# Patient Record
Sex: FEMALE | Race: WHITE | NOT HISPANIC OR LATINO | Employment: OTHER | URBAN - NONMETROPOLITAN AREA
[De-identification: names, ages, dates, MRNs, and addresses within clinical notes are randomized per-mention and may not be internally consistent; named-entity substitution may affect disease eponyms.]

---

## 2017-02-28 ENCOUNTER — HOSPITAL ENCOUNTER (OUTPATIENT)
Dept: GENERAL RADIOLOGY | Facility: HOSPITAL | Age: 67
Discharge: HOME | End: 2017-02-28
Attending: INTERNAL MEDICINE | Admitting: INTERNAL MEDICINE

## 2017-02-28 ENCOUNTER — TRANSCRIBE ORDERS (OUTPATIENT)
Dept: ULTRASOUND IMAGING | Facility: HOSPITAL | Age: 67
End: 2017-02-28

## 2017-02-28 DIAGNOSIS — R05.9 COUGH: Primary | ICD-10-CM

## 2017-02-28 DIAGNOSIS — R05.9 COUGH: ICD-10-CM

## 2017-02-28 PROCEDURE — 71020 HC CHEST PA AND LATERAL: CPT

## 2017-03-27 ENCOUNTER — TRANSCRIBE ORDERS (OUTPATIENT)
Dept: ADMINISTRATIVE | Facility: HOSPITAL | Age: 67
End: 2017-03-27

## 2017-03-27 DIAGNOSIS — R05.9 COUGH: Primary | ICD-10-CM

## 2017-03-27 DIAGNOSIS — R07.89 OTHER CHEST PAIN: ICD-10-CM

## 2017-03-28 ENCOUNTER — HOSPITAL ENCOUNTER (OUTPATIENT)
Dept: CT IMAGING | Facility: HOSPITAL | Age: 67
Discharge: HOME | End: 2017-03-28
Attending: INTERNAL MEDICINE | Admitting: INTERNAL MEDICINE

## 2017-03-28 DIAGNOSIS — R07.89 OTHER CHEST PAIN: ICD-10-CM

## 2017-03-28 DIAGNOSIS — R05.9 COUGH: ICD-10-CM

## 2017-03-28 LAB — CREAT BLDA-MCNC: 0.8 MG/DL (ref 0.6–1.3)

## 2017-03-28 PROCEDURE — 0 IOPAMIDOL PER 1 ML: Performed by: INTERNAL MEDICINE

## 2017-03-28 PROCEDURE — 71260 CT THORAX DX C+: CPT

## 2017-03-28 PROCEDURE — 82565 ASSAY OF CREATININE: CPT

## 2017-03-28 RX ADMIN — IOPAMIDOL 100 ML: 755 INJECTION, SOLUTION INTRAVENOUS at 10:30

## 2017-05-08 ENCOUNTER — OFFICE VISIT (OUTPATIENT)
Dept: CARDIOLOGY | Facility: CLINIC | Age: 67
End: 2017-05-08

## 2017-05-08 VITALS
DIASTOLIC BLOOD PRESSURE: 83 MMHG | BODY MASS INDEX: 21.76 KG/M2 | SYSTOLIC BLOOD PRESSURE: 162 MMHG | HEIGHT: 65 IN | OXYGEN SATURATION: 95 % | HEART RATE: 61 BPM | WEIGHT: 130.6 LBS

## 2017-05-08 DIAGNOSIS — R00.2 PALPITATIONS: Primary | ICD-10-CM

## 2017-05-08 DIAGNOSIS — I49.3 PVCS (PREMATURE VENTRICULAR CONTRACTIONS): ICD-10-CM

## 2017-05-08 DIAGNOSIS — I10 ESSENTIAL HYPERTENSION: ICD-10-CM

## 2017-05-08 PROCEDURE — 93000 ELECTROCARDIOGRAM COMPLETE: CPT | Performed by: NURSE PRACTITIONER

## 2017-05-08 PROCEDURE — 99213 OFFICE O/P EST LOW 20 MIN: CPT | Performed by: NURSE PRACTITIONER

## 2017-11-15 DIAGNOSIS — K21.9 GASTROESOPHAGEAL REFLUX DISEASE, ESOPHAGITIS PRESENCE NOT SPECIFIED: ICD-10-CM

## 2017-11-16 RX ORDER — OMEPRAZOLE 40 MG/1
CAPSULE, DELAYED RELEASE ORAL
Qty: 60 CAPSULE | Refills: 9 | OUTPATIENT
Start: 2017-11-16

## 2017-11-21 DIAGNOSIS — K21.9 GASTROESOPHAGEAL REFLUX DISEASE, ESOPHAGITIS PRESENCE NOT SPECIFIED: ICD-10-CM

## 2017-11-21 RX ORDER — OMEPRAZOLE 40 MG/1
CAPSULE, DELAYED RELEASE ORAL
Qty: 60 CAPSULE | Refills: 9 | OUTPATIENT
Start: 2017-11-21

## 2017-12-22 ENCOUNTER — HOSPITAL ENCOUNTER (OUTPATIENT)
Dept: CARDIOLOGY | Facility: HOSPITAL | Age: 67
Discharge: HOME | End: 2017-12-22
Attending: INTERNAL MEDICINE | Admitting: INTERNAL MEDICINE

## 2017-12-22 ENCOUNTER — TRANSCRIBE ORDERS (OUTPATIENT)
Dept: ADMINISTRATIVE | Facility: HOSPITAL | Age: 67
End: 2017-12-22

## 2017-12-22 DIAGNOSIS — R07.89 OTHER CHEST PAIN: Primary | ICD-10-CM

## 2017-12-22 PROCEDURE — 93010 ELECTROCARDIOGRAM REPORT: CPT | Performed by: INTERNAL MEDICINE

## 2017-12-22 PROCEDURE — 93005 ELECTROCARDIOGRAM TRACING: CPT

## 2018-01-24 ENCOUNTER — TELEPHONE (OUTPATIENT)
Dept: GASTROENTEROLOGY | Facility: CLINIC | Age: 68
End: 2018-01-24

## 2018-01-24 ENCOUNTER — OFFICE VISIT (OUTPATIENT)
Dept: GASTROENTEROLOGY | Facility: CLINIC | Age: 68
End: 2018-01-24

## 2018-01-24 VITALS
WEIGHT: 130 LBS | TEMPERATURE: 97.3 F | HEIGHT: 65 IN | HEART RATE: 50 BPM | SYSTOLIC BLOOD PRESSURE: 148 MMHG | DIASTOLIC BLOOD PRESSURE: 80 MMHG | OXYGEN SATURATION: 99 % | BODY MASS INDEX: 21.66 KG/M2

## 2018-01-24 DIAGNOSIS — R13.10 ODYNOPHAGIA: Primary | ICD-10-CM

## 2018-01-24 DIAGNOSIS — K21.9 GASTROESOPHAGEAL REFLUX DISEASE, ESOPHAGITIS PRESENCE NOT SPECIFIED: ICD-10-CM

## 2018-01-24 DIAGNOSIS — K59.09 OTHER CONSTIPATION: ICD-10-CM

## 2018-01-24 PROCEDURE — 99213 OFFICE O/P EST LOW 20 MIN: CPT | Performed by: NURSE PRACTITIONER

## 2018-01-24 RX ORDER — ESOMEPRAZOLE MAGNESIUM 40 MG/1
40 CAPSULE, DELAYED RELEASE ORAL 2 TIMES DAILY
Qty: 60 CAPSULE | Refills: 11 | Status: SHIPPED | OUTPATIENT
Start: 2018-01-24

## 2018-01-24 NOTE — TELEPHONE ENCOUNTER
patient called and said she went to  the nexium and it was going to cost her 350.00 so she did not get it.i talked to monserrat and she said for her to take one ppi over the counter in the morning and 2 at night.hopefully that will help.

## 2018-01-24 NOTE — PROGRESS NOTES
Chief Complaint   Patient presents with   • GI Problem     having worse problems burning in throat also constipation       Subjective     HPI     Chronic constipation.  She will go 3-4 days without BM.  She will only take something to relive constipation if she hurts.  No BRBPR or melena.  She c/o burning in esophagus for apx 5 months.  She has occasional heartburn, with occasional KIESHA burning.  Currently taking Nexium in the am and OTC Prilosec at hs.  She will also take Zantac apx 2 times per week.  She has decreased caffeine consumption.    Endoscopy (Dr Kaur) 2016-for eval of dysphagia-procedure normal  CScope (Dr Kaur) 2015-normal      Past Medical History:   Diagnosis Date   • Acid reflux    • Arthritis    • Asthma    • Hypertension    • Palpitations        Past Surgical History:   Procedure Laterality Date   • CHOLECYSTECTOMY     • COLONOSCOPY  10/16/2015    normal   • ENDOSCOPY N/A 11/23/2016    Procedure: ESOPHAGOGASTRODUODENOSCOPY WITH ANESTHESIA;  Surgeon: Grant Kaur DO;  Location: [facility] ENDOSCOPY;  Service:    • HYSTERECTOMY         Outpatient Prescriptions Marked as Taking for the 1/24/18 encounter (Office Visit) with YENNY Pickard   Medication Sig Dispense Refill   • dicyclomine (BENTYL) 10 MG capsule Take 10 mg by mouth 2 (Two) Times a Day.     • esomeprazole (nexIUM) 40 MG capsule Take 1 capsule by mouth 2 (Two) Times a Day. 60 capsule 11   • metoprolol tartrate (LOPRESSOR) 50 MG tablet Take 100 mg by mouth 2 (Two) Times a Day.     • valsartan (DIOVAN) 80 MG tablet Take 80 mg by mouth Daily.     • [DISCONTINUED] esomeprazole (NexIUM) 40 MG capsule Take 40 mg by mouth Every Morning Before Breakfast.     • [DISCONTINUED] omeprazole (PriLOSEC) 40 MG capsule Take 1 capsule by mouth 2 (Two) Times a Day. (Patient taking differently: Take 20 mg by mouth Daily.) 60 capsule 11       Allergies   Allergen Reactions   • Levaquin [Levofloxacin] Parasthesia   • Phenergan [Promethazine Hcl]  "Other (See Comments)     Jerky movements   • Altace [Ramipril] Palpitations       Social History     Social History   • Marital status:      Spouse name: N/A   • Number of children: N/A   • Years of education: N/A     Occupational History   • Not on file.     Social History Main Topics   • Smoking status: Never Smoker   • Smokeless tobacco: Never Used   • Alcohol use No   • Drug use: No   • Sexual activity: Defer     Other Topics Concern   • Not on file     Social History Narrative       Family History   Problem Relation Age of Onset   • Cancer Mother    • Cancer Father      lung   • Cancer Paternal Grandmother      stomach   • Colon cancer Neg Hx    • Esophageal cancer Neg Hx        Review of Systems   Constitutional: Negative for fatigue, fever and unexpected weight change.   HENT: Negative for hearing loss, sore throat and voice change.    Eyes: Negative for visual disturbance.   Respiratory: Negative for cough, shortness of breath and wheezing.    Cardiovascular: Negative for chest pain and palpitations.   Gastrointestinal: Positive for constipation. Negative for abdominal pain, blood in stool and vomiting.   Endocrine: Negative for polydipsia and polyuria.   Genitourinary: Negative for difficulty urinating, dysuria, hematuria and urgency.   Musculoskeletal: Negative for joint swelling and myalgias.   Skin: Negative for color change, rash and wound.   Neurological: Negative for dizziness, tremors, seizures and syncope.   Hematological: Does not bruise/bleed easily.   Psychiatric/Behavioral: Negative for agitation and confusion. The patient is not nervous/anxious.        Objective     Vitals:    01/24/18 0823   BP: 148/80   Pulse: 50   Temp: 97.3 °F (36.3 °C)   SpO2: 99%   Weight: 59 kg (130 lb)   Height: 165.1 cm (65\")     Body mass index is 21.63 kg/(m^2).    Physical Exam   Constitutional: She is oriented to person, place, and time. She appears well-developed and well-nourished.   HENT:   Head: " Normocephalic and atraumatic.   Eyes: Conjunctivae are normal. Pupils are equal, round, and reactive to light. No scleral icterus.   Neck: No JVD present. No thyroid mass and no thyromegaly present.   Cardiovascular: Normal rate, regular rhythm and normal heart sounds.  Exam reveals no gallop and no friction rub.    No murmur heard.  Pulmonary/Chest: Effort normal and breath sounds normal. No accessory muscle usage. No respiratory distress. She has no wheezes. She has no rales.   Abdominal: Soft. Bowel sounds are normal. She exhibits no distension, no ascites and no mass. There is no splenomegaly or hepatomegaly. There is no tenderness. There is no rebound and no guarding.   Genitourinary:   Genitourinary Comments: Rectal-Did not examine   Musculoskeletal: Normal range of motion. She exhibits no edema.   Neurological: She is alert and oriented to person, place, and time.   Deemed a reliable historian, able to converse without difficulty and able to move all extremities without difficulty   Skin: Skin is warm and dry.   Psychiatric: She has a normal mood and affect. Her behavior is normal.       Imaging Results (most recent)     None          Assessment/Plan     Liset was seen today for gi problem.    Diagnoses and all orders for this visit:    Odynophagia  -     esomeprazole (nexIUM) 40 MG capsule; Take 1 capsule by mouth 2 (Two) Times a Day.    Gastroesophageal reflux disease, esophagitis presence not specified      * Surgery not found *    Miralax 1-2 times per day, adjust as needed  take Nexium 30 min prior to breakfast and supper  Call in 3 weeks with update, if no improvement consider EGD  offerred EGD, pt wished to try bid Nexium   There are no Patient Instructions on file for this visit.

## 2018-05-14 ENCOUNTER — OFFICE VISIT (OUTPATIENT)
Dept: CARDIOLOGY | Facility: CLINIC | Age: 68
End: 2018-05-14

## 2018-05-14 VITALS
OXYGEN SATURATION: 98 % | SYSTOLIC BLOOD PRESSURE: 140 MMHG | BODY MASS INDEX: 22.2 KG/M2 | WEIGHT: 130 LBS | HEART RATE: 62 BPM | HEIGHT: 64 IN | DIASTOLIC BLOOD PRESSURE: 80 MMHG

## 2018-05-14 DIAGNOSIS — I10 ESSENTIAL HYPERTENSION: ICD-10-CM

## 2018-05-14 DIAGNOSIS — R00.2 PALPITATIONS: ICD-10-CM

## 2018-05-14 DIAGNOSIS — R06.09 EXERTIONAL DYSPNEA: ICD-10-CM

## 2018-05-14 DIAGNOSIS — E78.2 MIXED HYPERLIPIDEMIA: Primary | ICD-10-CM

## 2018-05-14 PROCEDURE — 93000 ELECTROCARDIOGRAM COMPLETE: CPT | Performed by: INTERNAL MEDICINE

## 2018-05-14 PROCEDURE — 99214 OFFICE O/P EST MOD 30 MIN: CPT | Performed by: INTERNAL MEDICINE

## 2018-05-14 NOTE — PROGRESS NOTES
Referring Provider: Harry Hastings MD    Reason for Follow-up Visit: HTN    Subjective .   Chief Complaint:   Chief Complaint   Patient presents with   • Follow-up     yearly   • Palpitations     has had some beating hard and fast at times.   • Hypertension     follwed by PCP.  which she is having her watch it because its been elevated at times.       History of present illness:  Liset Wright is a 67 y.o. yo female with history of HTN and palpitations. Hasn't felt well lately but BP has been running high. Says her palpitations have not been bad. Admits to chest pressure or burning.        History  Past Medical History:   Diagnosis Date   • Acid reflux    • Arrhythmia    • Arthritis    • Asthma    • Hypertension    • Palpitations    ,   Past Surgical History:   Procedure Laterality Date   • CHOLECYSTECTOMY     • COLONOSCOPY  10/16/2015    normal   • ENDOSCOPY N/A 11/23/2016    Procedure: ESOPHAGOGASTRODUODENOSCOPY WITH ANESTHESIA;  Surgeon: Grant Kaur DO;  Location: [facility] ENDOSCOPY;  Service:    • HYSTERECTOMY     ,   Family History   Problem Relation Age of Onset   • Cancer Mother    • Cancer Father      lung   • Cancer Paternal Grandmother      stomach   • No Known Problems Brother    • Colon cancer Neg Hx    • Esophageal cancer Neg Hx    ,   Social History   Substance Use Topics   • Smoking status: Never Smoker   • Smokeless tobacco: Never Used   • Alcohol use No   ,     Medications  Current Outpatient Prescriptions   Medication Sig Dispense Refill   • dicyclomine (BENTYL) 10 MG capsule Take 10 mg by mouth 2 (Two) Times a Day.     • esomeprazole (nexIUM) 40 MG capsule Take 1 capsule by mouth 2 (Two) Times a Day. 60 capsule 11   • metoprolol tartrate (LOPRESSOR) 50 MG tablet Take 100 mg by mouth 2 (Two) Times a Day.     • valsartan (DIOVAN) 80 MG tablet Take 160 mg by mouth Daily.       No current facility-administered medications for this visit.        Allergies:  Levaquin [levofloxacin]; Phenergan  "[promethazine hcl]; and Altace [ramipril]    Review of Systems  Review of Systems   HENT: Negative for nosebleeds.    Cardiovascular: Positive for chest pain, dyspnea on exertion and palpitations. Negative for claudication, irregular heartbeat, leg swelling, near-syncope, orthopnea, paroxysmal nocturnal dyspnea and syncope.   Respiratory: Positive for shortness of breath. Negative for cough and hemoptysis.    Gastrointestinal: Negative for dysphagia, hematemesis and melena.   Genitourinary: Negative for hematuria.   All other systems reviewed and are negative.      Objective     Physical Exam:  /80 (BP Location: Left arm, Patient Position: Sitting, Cuff Size: Adult)   Pulse 62   Ht 162.6 cm (64\")   Wt 59 kg (130 lb)   SpO2 98%   BMI 22.31 kg/m²   Physical Exam   Constitutional: She is oriented to person, place, and time. She appears well-developed and well-nourished. No distress.   HENT:   Head: Normocephalic and atraumatic.   Eyes: No scleral icterus.   Neck: Normal range of motion.   Cardiovascular: Normal rate, regular rhythm and normal heart sounds.  Exam reveals no gallop and no friction rub.    No murmur heard.  Pulmonary/Chest: Effort normal and breath sounds normal. No respiratory distress. She has no wheezes. She has no rales.   Abdominal: Soft. Bowel sounds are normal. She exhibits no distension. There is no tenderness.   Musculoskeletal: She exhibits no edema.   Neurological: She is alert and oriented to person, place, and time. No cranial nerve deficit.   Skin: Skin is warm and dry. She is not diaphoretic. No erythema.   Psychiatric: She has a normal mood and affect. Her behavior is normal.       Results Review:    ECG 12 Lead  Date/Time: 5/14/2018 9:33 AM  Performed by: ERROL JARRELL  Authorized by: ERROL JARRELL   Comparison: compared with previous ECG   Similar to previous ECG  Rhythm: sinus rhythm  Rate: normal  Conduction: conduction normal  ST Segments: ST segments normal  T Waves: T " waves normal  QRS axis: normal  Clinical impression: normal ECG            Hospital Outpatient Visit on 03/28/2017   Component Date Value Ref Range Status   • Creatinine 03/28/2017 0.80  0.60 - 1.30 mg/dL Final       Assessment/Plan   Liset was seen today for follow-up, palpitations and hypertension.    Diagnoses and all orders for this visit:    Exertional dyspnea, may be an anginal equivalent but could also be do to HTN. Will schedule stress and check her lipid profile    Essential hypertension, better control on increased dose of diovan    Palpitations, stable    Other orders  -     ECG 12 Lead

## 2018-05-25 ENCOUNTER — HOSPITAL ENCOUNTER (OUTPATIENT)
Dept: CARDIOLOGY | Facility: HOSPITAL | Age: 68
Discharge: HOME | End: 2018-05-25
Attending: INTERNAL MEDICINE | Admitting: INTERNAL MEDICINE

## 2018-05-25 VITALS — SYSTOLIC BLOOD PRESSURE: 192 MMHG | HEART RATE: 56 BPM | DIASTOLIC BLOOD PRESSURE: 95 MMHG

## 2018-05-25 DIAGNOSIS — R06.09 EXERTIONAL DYSPNEA: ICD-10-CM

## 2018-05-25 PROCEDURE — 93350 STRESS TTE ONLY: CPT

## 2018-05-25 PROCEDURE — 25010000002 PERFLUTREN 6.52 MG/ML SUSPENSION: Performed by: INTERNAL MEDICINE

## 2018-05-25 PROCEDURE — 93018 CV STRESS TEST I&R ONLY: CPT | Performed by: INTERNAL MEDICINE

## 2018-05-25 PROCEDURE — 93352 ADMIN ECG CONTRAST AGENT: CPT | Performed by: INTERNAL MEDICINE

## 2018-05-25 PROCEDURE — 25010000003 DOBUTAMINE PER 250 MG: Performed by: INTERNAL MEDICINE

## 2018-05-25 PROCEDURE — 93350 STRESS TTE ONLY: CPT | Performed by: INTERNAL MEDICINE

## 2018-05-25 PROCEDURE — 93017 CV STRESS TEST TRACING ONLY: CPT

## 2018-05-25 RX ORDER — DOBUTAMINE HYDROCHLORIDE 100 MG/100ML
10 INJECTION INTRAVENOUS
Status: DISCONTINUED | OUTPATIENT
Start: 2018-05-25 | End: 2018-05-26 | Stop reason: HOSPADM

## 2018-05-25 RX ADMIN — ATROPINE SULFATE 2 MG: 0.1 INJECTION, SOLUTION INTRAVENOUS at 09:32

## 2018-05-25 RX ADMIN — PERFLUTREN 8.48 MG: 6.52 INJECTION, SUSPENSION INTRAVENOUS at 09:25

## 2018-05-25 RX ADMIN — Medication 10 MCG/KG/MIN: at 09:27

## 2018-05-29 LAB
BH CV STRESS BP STAGE 1: NORMAL
BH CV STRESS BP STAGE 2: NORMAL
BH CV STRESS DURATION MIN STAGE 1: 3
BH CV STRESS DURATION MIN STAGE 2: 3
BH CV STRESS DURATION SEC STAGE 1: 0
BH CV STRESS DURATION SEC STAGE 2: 0
BH CV STRESS GRADE STAGE 1: 10
BH CV STRESS GRADE STAGE 2: 12
BH CV STRESS HR STAGE 1: 78
BH CV STRESS HR STAGE 2: 92
BH CV STRESS METS STAGE 1: 5
BH CV STRESS METS STAGE 2: 7.5
BH CV STRESS PROTOCOL 1: NORMAL
BH CV STRESS RECOVERY BP: NORMAL MMHG
BH CV STRESS RECOVERY HR: 102 BPM
BH CV STRESS SPEED STAGE 1: 1.7
BH CV STRESS SPEED STAGE 2: 2.5
BH CV STRESS STAGE 1: 1
BH CV STRESS STAGE 2: 2
MAXIMAL PREDICTED HEART RATE: 155 BPM
PERCENT MAX PREDICTED HR: 81.94 %
STRESS BASELINE BP: NORMAL MMHG
STRESS BASELINE HR: 60 BPM
STRESS PERCENT HR: 96 %
STRESS POST EXERCISE DUR MIN: 9 MIN
STRESS POST EXERCISE DUR SEC: 53 SEC
STRESS POST PEAK BP: NORMAL MMHG
STRESS POST PEAK HR: 127 BPM
STRESS TARGET HR: 132 BPM

## 2018-05-30 ENCOUNTER — TELEPHONE (OUTPATIENT)
Dept: CARDIOLOGY | Facility: CLINIC | Age: 68
End: 2018-05-30

## 2018-05-30 NOTE — TELEPHONE ENCOUNTER
----- Message from Blayne Hawk MD sent at 5/29/2018 10:23 AM CDT -----  Negative for ischemia    5/30/18   Pt informed.  Left a  msg.  Elias Schmitt, CMA

## 2018-08-27 LAB
CHOLEST SERPL-MCNC: 183 MG/DL (ref 100–199)
HDLC SERPL-MCNC: 83 MG/DL
LDLC SERPL CALC-MCNC: 89 MG/DL (ref 0–99)
TRIGL SERPL-MCNC: 55 MG/DL (ref 0–149)
VLDLC SERPL CALC-MCNC: 11 MG/DL (ref 5–40)

## 2018-11-28 ENCOUNTER — OFFICE VISIT (OUTPATIENT)
Dept: GASTROENTEROLOGY | Facility: CLINIC | Age: 68
End: 2018-11-28

## 2018-11-28 VITALS
SYSTOLIC BLOOD PRESSURE: 150 MMHG | DIASTOLIC BLOOD PRESSURE: 70 MMHG | WEIGHT: 132 LBS | BODY MASS INDEX: 22.53 KG/M2 | HEART RATE: 59 BPM | HEIGHT: 64 IN | OXYGEN SATURATION: 97 % | TEMPERATURE: 96.4 F

## 2018-11-28 DIAGNOSIS — K21.9 GASTROESOPHAGEAL REFLUX DISEASE, ESOPHAGITIS PRESENCE NOT SPECIFIED: ICD-10-CM

## 2018-11-28 DIAGNOSIS — R10.13 EPIGASTRIC PAIN: Primary | ICD-10-CM

## 2018-11-28 PROCEDURE — 99214 OFFICE O/P EST MOD 30 MIN: CPT | Performed by: NURSE PRACTITIONER

## 2018-11-28 NOTE — PROGRESS NOTES
"Chief Complaint   Patient presents with   • GI Problem     pt states her esophagus hurts/burns when she eats, x 3-4 months       Subjective     HPI    Dull KIESHA pain that has been constant on daily basis x 3 months.  No aggravating or alleviating factors to pain.   She has burning in esophagus after eating.  She has had associated nausea, no emesis.  Nausea has improved at this time. Occasional indigestion depending on what she eats.  Bowels have not changed.  She has hx of constipation, not worse than \"her normal.\"  No rectal bleeding or melena.  Sometimes has difficulty swallowing breads and meat.  Drinking something will relieve sx of dysphagia. Sx felt in mid esophagus.  Currently taking OTC nexium, she takes 4 pills per day, insurance will not cover prescription nexium.  Utilization of prilosec in the past did not provide relief.  She has utilized Zantac unsuccessfully  Utiizes Excedrin Migraine avg 4 times per month    Endoscopy (Dr Kaur) 11/2016-normal    CScope (Dr Kaur) 2015-unremarkable (5-10)  CScope (Dr Kaur) 2011-marginal prep colon      Past Medical History:   Diagnosis Date   • Acid reflux    • Arrhythmia    • Arthritis    • Asthma    • Hypertension    • Palpitations        Past Surgical History:   Procedure Laterality Date   • CARDIAC CATHETERIZATION     • CHOLECYSTECTOMY     • COLONOSCOPY  10/16/2015    normal   • HYSTERECTOMY         Outpatient Medications Marked as Taking for the 11/28/18 encounter (Office Visit) with Kalyan Lazcano APRN   Medication Sig Dispense Refill   • dicyclomine (BENTYL) 10 MG capsule Take 10 mg by mouth 2 (Two) Times a Day.     • esomeprazole (nexIUM) 40 MG capsule Take 1 capsule by mouth 2 (Two) Times a Day. 60 capsule 11   • metoprolol tartrate (LOPRESSOR) 50 MG tablet Take 100 mg by mouth 2 (Two) Times a Day.     • valsartan (DIOVAN) 320 MG tablet Take 320 mg by mouth Daily.         Allergies   Allergen Reactions   • Levaquin [Levofloxacin] Paresthesia   • " Mometasone Furo-Formoterol Fum Unknown (See Comments)   • Phenergan [Promethazine Hcl] Other (See Comments)     Jerky movements   • Altace [Ramipril] Palpitations       Social History     Socioeconomic History   • Marital status:      Spouse name: Not on file   • Number of children: Not on file   • Years of education: Not on file   • Highest education level: Not on file   Social Needs   • Financial resource strain: Not on file   • Food insecurity - worry: Not on file   • Food insecurity - inability: Not on file   • Transportation needs - medical: Not on file   • Transportation needs - non-medical: Not on file   Occupational History   • Not on file   Tobacco Use   • Smoking status: Never Smoker   • Smokeless tobacco: Never Used   Substance and Sexual Activity   • Alcohol use: No   • Drug use: No   • Sexual activity: Defer   Other Topics Concern   • Not on file   Social History Narrative   • Not on file       Family History   Problem Relation Age of Onset   • Cancer Mother    • Cancer Father         lung   • Cancer Paternal Grandmother         stomach   • No Known Problems Brother    • Colon cancer Neg Hx    • Esophageal cancer Neg Hx        Review of Systems   Constitutional: Negative for fatigue, fever and unexpected weight change.   HENT: Negative for hearing loss, sore throat and voice change.    Eyes: Negative for visual disturbance.   Respiratory: Negative for cough, shortness of breath and wheezing.    Cardiovascular: Negative for chest pain and palpitations.   Gastrointestinal: Positive for constipation. Negative for abdominal pain, blood in stool and vomiting.   Endocrine: Negative for polydipsia and polyuria.   Genitourinary: Negative for difficulty urinating, dysuria, hematuria and urgency.   Musculoskeletal: Negative for joint swelling and myalgias.   Skin: Negative for color change, rash and wound.   Neurological: Negative for dizziness, tremors, seizures and syncope.   Hematological: Does not  "bruise/bleed easily.   Psychiatric/Behavioral: Negative for agitation and confusion. The patient is not nervous/anxious.        Objective     Vitals:    11/28/18 1359   BP: 150/70   Pulse: 59   Temp: 96.4 °F (35.8 °C)   SpO2: 97%   Weight: 59.9 kg (132 lb)   Height: 162.6 cm (64\")     Body mass index is 22.66 kg/m².    Physical Exam   Constitutional: She is oriented to person, place, and time. She appears well-developed and well-nourished. She is cooperative.   HENT:   Head: Normocephalic and atraumatic.   Eyes: Conjunctivae are normal. Pupils are equal, round, and reactive to light. No scleral icterus.   Neck: Normal range of motion. Neck supple. No JVD present. No thyroid mass and no thyromegaly present.   Cardiovascular: Normal rate, regular rhythm and normal heart sounds. Exam reveals no gallop and no friction rub.   No murmur heard.  Pulmonary/Chest: Effort normal and breath sounds normal. No accessory muscle usage. No respiratory distress. She has no wheezes. She has no rales.   Abdominal: Soft. Normal appearance and bowel sounds are normal. She exhibits no distension, no ascites and no mass. There is no hepatosplenomegaly. There is no tenderness. There is no rebound and no guarding.   Musculoskeletal: Normal range of motion. She exhibits no edema or tenderness.     Vascular Status -  Her right foot exhibits normal foot vasculature  and no edema. Her left foot exhibits normal foot vasculature  and no edema.  Lymphadenopathy:     She has no cervical adenopathy.   Neurological: She is alert and oriented to person, place, and time. She has normal strength. Gait normal.   Skin: Skin is warm, dry and intact. No rash noted.       Imaging Results (most recent)     None          Body mass index is 22.66 kg/m².    Assessment/Plan     iLset was seen today for gi problem.    Diagnoses and all orders for this visit:    Epigastric pain  -     Case Request; Standing  -     Implement Anesthesia Orders Day of Procedure; " Standing  -     Obtain Informed Consent; Standing    Gastroesophageal reflux disease, esophagitis presence not specified        ESOPHAGOGASTRODUODENOSCOPY WITH ANESTHESIA (N/A)      Suggest to stop Bentyl, Miralax did not help in past, will trial Linzess  Cont Nexium 2 po bid, take 30 min prior to breakfast/supper    The risk of the endoscopy were discussed in detail.  We discussed the risk of perforation (one out of 0845-3923, riskier with dilation), bleeding (one out of 500), and the rare risks of infection, adverse reaction to anesthesia, respiratory failure, cardiac failure including MI and adverse reaction to medications, etc.  We discussed consequences that could occur if a risk were to develop such as the need for hospitalization, blood transfusion, surgical intervention, medications, pain and disability and death.  Alternatives include not doing anything, or pursuing an UGI series which only offers a diagnosis with potential less accuracy compared to egd.  The patient verbalizes understanding and agrees to proceed.      There are no Patient Instructions on file for this visit.

## 2018-12-11 ENCOUNTER — ANESTHESIA (OUTPATIENT)
Dept: GASTROENTEROLOGY | Facility: HOSPITAL | Age: 68
End: 2018-12-11

## 2018-12-11 ENCOUNTER — HOSPITAL ENCOUNTER (OUTPATIENT)
Facility: HOSPITAL | Age: 68
Setting detail: HOSPITAL OUTPATIENT SURGERY
Discharge: HOME | End: 2018-12-11
Attending: INTERNAL MEDICINE | Admitting: INTERNAL MEDICINE

## 2018-12-11 ENCOUNTER — ANESTHESIA EVENT (OUTPATIENT)
Dept: GASTROENTEROLOGY | Facility: HOSPITAL | Age: 68
End: 2018-12-11

## 2018-12-11 VITALS
RESPIRATION RATE: 19 BRPM | DIASTOLIC BLOOD PRESSURE: 77 MMHG | HEART RATE: 58 BPM | TEMPERATURE: 97.5 F | WEIGHT: 131 LBS | SYSTOLIC BLOOD PRESSURE: 186 MMHG | OXYGEN SATURATION: 99 % | BODY MASS INDEX: 22.36 KG/M2 | HEIGHT: 64 IN

## 2018-12-11 DIAGNOSIS — R10.13 EPIGASTRIC PAIN: ICD-10-CM

## 2018-12-11 PROCEDURE — 43239 EGD BIOPSY SINGLE/MULTIPLE: CPT | Performed by: INTERNAL MEDICINE

## 2018-12-11 PROCEDURE — 25010000002 PROPOFOL 10 MG/ML EMULSION: Performed by: NURSE ANESTHETIST, CERTIFIED REGISTERED

## 2018-12-11 PROCEDURE — 87081 CULTURE SCREEN ONLY: CPT | Performed by: INTERNAL MEDICINE

## 2018-12-11 RX ORDER — SODIUM CHLORIDE 9 MG/ML
500 INJECTION, SOLUTION INTRAVENOUS CONTINUOUS PRN
Status: DISCONTINUED | OUTPATIENT
Start: 2018-12-11 | End: 2018-12-11 | Stop reason: HOSPADM

## 2018-12-11 RX ORDER — SODIUM CHLORIDE 0.9 % (FLUSH) 0.9 %
3 SYRINGE (ML) INJECTION AS NEEDED
Status: DISCONTINUED | OUTPATIENT
Start: 2018-12-11 | End: 2018-12-11 | Stop reason: HOSPADM

## 2018-12-11 RX ORDER — PROPOFOL 10 MG/ML
VIAL (ML) INTRAVENOUS AS NEEDED
Status: DISCONTINUED | OUTPATIENT
Start: 2018-12-11 | End: 2018-12-11 | Stop reason: SURG

## 2018-12-11 RX ORDER — LIDOCAINE HYDROCHLORIDE 20 MG/ML
INJECTION, SOLUTION INFILTRATION; PERINEURAL AS NEEDED
Status: DISCONTINUED | OUTPATIENT
Start: 2018-12-11 | End: 2018-12-11 | Stop reason: SURG

## 2018-12-11 RX ADMIN — PROPOFOL 100 MG: 10 INJECTION, EMULSION INTRAVENOUS at 08:31

## 2018-12-11 RX ADMIN — SODIUM CHLORIDE 500 ML: 9 INJECTION, SOLUTION INTRAVENOUS at 07:25

## 2018-12-11 RX ADMIN — LIDOCAINE HYDROCHLORIDE 140 MG: 20 INJECTION, SOLUTION INFILTRATION; PERINEURAL at 08:31

## 2018-12-11 NOTE — ANESTHESIA POSTPROCEDURE EVALUATION
"Patient: Liset Wright    Procedure Summary     Date:  12/11/18 Room / Location:  [facility] ENDOSCOPY 4 / [facility]    Anesthesia Start:  0827 Anesthesia Stop:  0837    Procedure:  ESOPHAGOGASTRODUODENOSCOPY WITH ANESTHESIA (N/A ) Diagnosis:       Epigastric pain      (Epigastric pain [R10.13])    Surgeon:  Grant Kaur DO Provider:  Hugo Wong CRNA    Anesthesia Type:  general ASA Status:  2          Anesthesia Type: general  Last vitals  BP   142/52 (12/11/18 0835)   Temp   97.5 °F (36.4 °C) (12/11/18 0713)   Pulse   60 (12/11/18 0835)   Resp   15 (12/11/18 0835)     SpO2   97 % (12/11/18 0835)     Post Anesthesia Care and Evaluation    Patient location during evaluation: PACU  Patient participation: complete - patient participated  Level of consciousness: awake and alert  Pain management: adequate  Airway patency: patent  Anesthetic complications: No anesthetic complications    Cardiovascular status: acceptable  Respiratory status: acceptable  Hydration status: acceptable    Comments: Blood pressure 142/52, pulse 60, temperature 97.5 °F (36.4 °C), temperature source Temporal, resp. rate 15, height 162.6 cm (64\"), weight 59.4 kg (131 lb), SpO2 97 %, not currently breastfeeding.    Pt discharged from PACU based on telma score >8      "

## 2018-12-11 NOTE — ANESTHESIA PREPROCEDURE EVALUATION
Anesthesia Evaluation     Patient summary reviewed   NPO Solid Status: > 8 hours  NPO Liquid Status: > 2 hours           Airway   Mallampati: I  TM distance: >3 FB  Neck ROM: full  No difficulty expected  Dental    (+) partials    Pulmonary    (+) asthma,   (-) sleep apnea, not a smoker  Cardiovascular   Exercise tolerance: good (4-7 METS)    Patient on routine beta blocker and Beta blocker given within 24 hours of surgery    (+) hypertension, valvular problems/murmurs murmur,       Neuro/Psych  (-) seizures, CVA  GI/Hepatic/Renal/Endo    (+)  GERD,    (-) liver disease, no renal disease, diabetes    Musculoskeletal     Abdominal    Substance History      OB/GYN          Other                        Anesthesia Plan    ASA 2     general   total IV anesthesia  intravenous induction   Anesthetic plan, all risks, benefits, and alternatives have been provided, discussed and informed consent has been obtained with: patient.

## 2018-12-12 LAB — UREASE TISS QL: NEGATIVE

## 2019-01-16 ENCOUNTER — APPOINTMENT (OUTPATIENT)
Dept: LAB | Facility: HOSPITAL | Age: 69
End: 2019-01-16
Attending: INTERNAL MEDICINE

## 2019-01-16 ENCOUNTER — TRANSCRIBE ORDERS (OUTPATIENT)
Dept: ADMINISTRATIVE | Facility: HOSPITAL | Age: 69
End: 2019-01-16

## 2019-01-16 DIAGNOSIS — D72.829 LEUKOCYTOSIS, UNSPECIFIED TYPE: ICD-10-CM

## 2019-01-16 DIAGNOSIS — R10.12 LEFT UPPER QUADRANT PAIN: Primary | ICD-10-CM

## 2019-01-16 PROCEDURE — 88185 FLOWCYTOMETRY/TC ADD-ON: CPT | Performed by: INTERNAL MEDICINE

## 2019-01-16 PROCEDURE — 86665 EPSTEIN-BARR CAPSID VCA: CPT | Performed by: INTERNAL MEDICINE

## 2019-01-16 PROCEDURE — 86663 EPSTEIN-BARR ANTIBODY: CPT | Performed by: INTERNAL MEDICINE

## 2019-01-16 PROCEDURE — 88184 FLOWCYTOMETRY/ TC 1 MARKER: CPT | Performed by: INTERNAL MEDICINE

## 2019-01-16 PROCEDURE — 88189 FLOWCYTOMETRY/READ 16 & >: CPT | Performed by: INTERNAL MEDICINE

## 2019-01-16 PROCEDURE — 36415 COLL VENOUS BLD VENIPUNCTURE: CPT

## 2019-01-16 PROCEDURE — 86664 EPSTEIN-BARR NUCLEAR ANTIGEN: CPT | Performed by: INTERNAL MEDICINE

## 2019-01-17 LAB
EBV EA IGG SER-ACNC: <9 U/ML (ref 0–8.9)
EBV NA IGG SER IA-ACNC: 118 U/ML (ref 0–17.9)
EBV VCA IGG SER-ACNC: 239 U/ML (ref 0–17.9)
EBV VCA IGM SER-ACNC: <36 U/ML (ref 0–35.9)
INTERPRETATION: ABNORMAL
REF LAB TEST METHOD: NORMAL

## 2019-01-28 ENCOUNTER — LAB REQUISITION (OUTPATIENT)
Dept: LAB | Facility: HOSPITAL | Age: 69
End: 2019-01-28

## 2019-01-28 ENCOUNTER — TRANSCRIBE ORDERS (OUTPATIENT)
Dept: ADMINISTRATIVE | Facility: HOSPITAL | Age: 69
End: 2019-01-28

## 2019-01-28 DIAGNOSIS — C91.10 ANEMIA ASSOCIATED WITH CHRONIC LYMPHOCYTIC LEUKEMIA TREATED WITH ERYTHROPOIETIN (HCC): Primary | ICD-10-CM

## 2019-01-28 DIAGNOSIS — Z00.00 ENCOUNTER FOR GENERAL ADULT MEDICAL EXAMINATION WITHOUT ABNORMAL FINDINGS: ICD-10-CM

## 2019-01-28 DIAGNOSIS — D63.0 ANEMIA ASSOCIATED WITH CHRONIC LYMPHOCYTIC LEUKEMIA TREATED WITH ERYTHROPOIETIN (HCC): Primary | ICD-10-CM

## 2019-01-28 LAB
ALBUMIN SERPL-MCNC: 4.4 G/DL (ref 3.5–5)
ALBUMIN/GLOB SERPL: 1.6 G/DL (ref 1.1–2.5)
ALP SERPL-CCNC: 78 U/L (ref 24–120)
ALT SERPL W P-5'-P-CCNC: 27 U/L (ref 0–54)
ANION GAP SERPL CALCULATED.3IONS-SCNC: 9 MMOL/L (ref 4–13)
AST SERPL-CCNC: 30 U/L (ref 7–45)
BILIRUB SERPL-MCNC: 0.5 MG/DL (ref 0.1–1)
BUN BLD-MCNC: 13 MG/DL (ref 5–21)
BUN/CREAT SERPL: 18.8 (ref 7–25)
CALCIUM SPEC-SCNC: 9.9 MG/DL (ref 8.4–10.4)
CHLORIDE SERPL-SCNC: 101 MMOL/L (ref 98–110)
CO2 SERPL-SCNC: 30 MMOL/L (ref 24–31)
CREAT BLD-MCNC: 0.69 MG/DL (ref 0.5–1.4)
FERRITIN SERPL-MCNC: 23.7 NG/ML (ref 11.1–264)
FOLATE SERPL-MCNC: 15 NG/ML
GFR SERPL CREATININE-BSD FRML MDRD: 85 ML/MIN/1.73
GLOBULIN UR ELPH-MCNC: 2.7 GM/DL
GLUCOSE BLD-MCNC: 95 MG/DL (ref 70–100)
HIV1+2 AB SER QL: NEGATIVE
IRON 24H UR-MRATE: 101 MCG/DL (ref 42–180)
IRON SATN MFR SERPL: 30 % (ref 20–45)
LDH SERPL-CCNC: 539 U/L (ref 265–665)
POTASSIUM BLD-SCNC: 5 MMOL/L (ref 3.5–5.3)
PROT SERPL-MCNC: 7.1 G/DL (ref 6.3–8.7)
SODIUM BLD-SCNC: 140 MMOL/L (ref 135–145)
TIBC SERPL-MCNC: 333 MCG/DL (ref 225–420)
VIT B12 BLD-MCNC: 431 PG/ML (ref 239–931)

## 2019-01-28 PROCEDURE — 83615 LACTATE (LD) (LDH) ENZYME: CPT | Performed by: INTERNAL MEDICINE

## 2019-01-28 PROCEDURE — 83550 IRON BINDING TEST: CPT | Performed by: INTERNAL MEDICINE

## 2019-01-28 PROCEDURE — 82746 ASSAY OF FOLIC ACID SERUM: CPT | Performed by: INTERNAL MEDICINE

## 2019-01-28 PROCEDURE — 80053 COMPREHEN METABOLIC PANEL: CPT | Performed by: INTERNAL MEDICINE

## 2019-01-28 PROCEDURE — 82784 ASSAY IGA/IGD/IGG/IGM EACH: CPT | Performed by: INTERNAL MEDICINE

## 2019-01-28 PROCEDURE — 83540 ASSAY OF IRON: CPT | Performed by: INTERNAL MEDICINE

## 2019-01-28 PROCEDURE — 82232 ASSAY OF BETA-2 PROTEIN: CPT | Performed by: INTERNAL MEDICINE

## 2019-01-28 PROCEDURE — 82607 VITAMIN B-12: CPT | Performed by: INTERNAL MEDICINE

## 2019-01-28 PROCEDURE — 82728 ASSAY OF FERRITIN: CPT | Performed by: INTERNAL MEDICINE

## 2019-01-28 PROCEDURE — G0432 EIA HIV-1/HIV-2 SCREEN: HCPCS | Performed by: INTERNAL MEDICINE

## 2019-01-29 LAB — IGG SERPL-MCNC: 742 MG/DL (ref 700–1600)

## 2019-01-30 LAB — B2 MICROGLOB SERPL-MCNC: 1.3 MG/L (ref 0.6–2.4)

## 2019-02-01 ENCOUNTER — HOSPITAL ENCOUNTER (OUTPATIENT)
Dept: CT IMAGING | Facility: HOSPITAL | Age: 69
Discharge: HOME | End: 2019-02-01
Attending: INTERNAL MEDICINE | Admitting: RADIOLOGY

## 2019-02-01 VITALS
RESPIRATION RATE: 16 BRPM | WEIGHT: 127.5 LBS | DIASTOLIC BLOOD PRESSURE: 56 MMHG | HEART RATE: 64 BPM | BODY MASS INDEX: 21.24 KG/M2 | OXYGEN SATURATION: 100 % | SYSTOLIC BLOOD PRESSURE: 121 MMHG | TEMPERATURE: 98 F | HEIGHT: 65 IN

## 2019-02-01 LAB
APTT PPP: 29.6 SECONDS (ref 24.1–34.8)
BASOPHILS # BLD MANUAL: 0.16 10*3/MM3 (ref 0–0.2)
BASOPHILS NFR BLD AUTO: 1.1 % (ref 0–2)
DEPRECATED RDW RBC AUTO: 42.8 FL (ref 40–54)
EOSINOPHIL # BLD MANUAL: 0.3 10*3/MM3 (ref 0–0.7)
EOSINOPHIL NFR BLD MANUAL: 2.1 % (ref 0–4)
ERYTHROCYTE [DISTWIDTH] IN BLOOD BY AUTOMATED COUNT: 13.1 % (ref 12–15)
HCT VFR BLD AUTO: 36 % (ref 37–47)
HGB BLD-MCNC: 12 G/DL (ref 12–16)
INR PPP: 0.94 (ref 0.91–1.09)
LYMPHOCYTES # BLD MANUAL: 8.64 10*3/MM3 (ref 0.72–4.86)
LYMPHOCYTES NFR BLD MANUAL: 3.2 % (ref 4–12)
LYMPHOCYTES NFR BLD MANUAL: 59.6 % (ref 15–45)
MCH RBC QN AUTO: 29.9 PG (ref 28–32)
MCHC RBC AUTO-ENTMCNC: 33.3 G/DL (ref 33–36)
MCV RBC AUTO: 89.6 FL (ref 82–98)
MONOCYTES # BLD AUTO: 0.46 10*3/MM3 (ref 0.19–1.3)
NEUTROPHILS # BLD AUTO: 4.93 10*3/MM3 (ref 1.87–8.4)
NEUTROPHILS NFR BLD MANUAL: 34 % (ref 39–78)
PLAT MORPH BLD: NORMAL
PLATELET # BLD AUTO: 266 10*3/MM3 (ref 130–400)
PLATELET # BLD AUTO: 266 10*3/MM3 (ref 130–400)
PMV BLD AUTO: 10.5 FL (ref 6–12)
POIKILOCYTOSIS BLD QL SMEAR: ABNORMAL
PROTHROMBIN TIME: 12.8 SECONDS (ref 11.9–14.6)
RBC # BLD AUTO: 4.02 10*6/MM3 (ref 4.2–5.4)
SMUDGE CELLS BLD QL SMEAR: ABNORMAL
WBC NRBC COR # BLD: 14.5 10*3/MM3 (ref 4.8–10.8)

## 2019-02-01 PROCEDURE — 88237 TISSUE CULTURE BONE MARROW: CPT | Performed by: INTERNAL MEDICINE

## 2019-02-01 PROCEDURE — 77012 CT SCAN FOR NEEDLE BIOPSY: CPT

## 2019-02-01 PROCEDURE — 88264 CHROMOSOME ANALYSIS 20-25: CPT | Performed by: INTERNAL MEDICINE

## 2019-02-01 PROCEDURE — 25010000002 MIDAZOLAM PER 1 MG: Performed by: RADIOLOGY

## 2019-02-01 PROCEDURE — 88185 FLOWCYTOMETRY/TC ADD-ON: CPT | Performed by: INTERNAL MEDICINE

## 2019-02-01 PROCEDURE — 88311 DECALCIFY TISSUE: CPT | Performed by: INTERNAL MEDICINE

## 2019-02-01 PROCEDURE — 85730 THROMBOPLASTIN TIME PARTIAL: CPT | Performed by: INTERNAL MEDICINE

## 2019-02-01 PROCEDURE — 88280 CHROMOSOME KARYOTYPE STUDY: CPT | Performed by: INTERNAL MEDICINE

## 2019-02-01 PROCEDURE — 88305 TISSUE EXAM BY PATHOLOGIST: CPT

## 2019-02-01 PROCEDURE — 85610 PROTHROMBIN TIME: CPT | Performed by: INTERNAL MEDICINE

## 2019-02-01 PROCEDURE — 88342 IMHCHEM/IMCYTCHM 1ST ANTB: CPT

## 2019-02-01 PROCEDURE — 85025 COMPLETE CBC W/AUTO DIFF WBC: CPT | Performed by: INTERNAL MEDICINE

## 2019-02-01 PROCEDURE — 25010000002 FENTANYL CITRATE (PF) 100 MCG/2ML SOLUTION: Performed by: RADIOLOGY

## 2019-02-01 PROCEDURE — 88313 SPECIAL STAINS GROUP 2: CPT | Performed by: INTERNAL MEDICINE

## 2019-02-01 PROCEDURE — 88184 FLOWCYTOMETRY/ TC 1 MARKER: CPT | Performed by: INTERNAL MEDICINE

## 2019-02-01 PROCEDURE — 85049 AUTOMATED PLATELET COUNT: CPT | Performed by: INTERNAL MEDICINE

## 2019-02-01 PROCEDURE — 88323 CONSLTJ&REPRT MATRL PREP SLD: CPT

## 2019-02-01 PROCEDURE — 88305 TISSUE EXAM BY PATHOLOGIST: CPT | Performed by: INTERNAL MEDICINE

## 2019-02-01 PROCEDURE — 88313 SPECIAL STAINS GROUP 2: CPT

## 2019-02-01 PROCEDURE — 88341 IMHCHEM/IMCYTCHM EA ADD ANTB: CPT

## 2019-02-01 RX ORDER — MIDAZOLAM HYDROCHLORIDE 1 MG/ML
INJECTION INTRAMUSCULAR; INTRAVENOUS
Status: COMPLETED | OUTPATIENT
Start: 2019-02-01 | End: 2019-02-01

## 2019-02-01 RX ORDER — SODIUM CHLORIDE 0.9 % (FLUSH) 0.9 %
3-10 SYRINGE (ML) INJECTION AS NEEDED
Status: DISCONTINUED | OUTPATIENT
Start: 2019-02-01 | End: 2019-02-02 | Stop reason: HOSPADM

## 2019-02-01 RX ORDER — FENTANYL CITRATE 50 UG/ML
INJECTION, SOLUTION INTRAMUSCULAR; INTRAVENOUS
Status: COMPLETED | OUTPATIENT
Start: 2019-02-01 | End: 2019-02-01

## 2019-02-01 RX ORDER — LIDOCAINE HYDROCHLORIDE 10 MG/ML
INJECTION, SOLUTION INFILTRATION; PERINEURAL
Status: COMPLETED | OUTPATIENT
Start: 2019-02-01 | End: 2019-02-01

## 2019-02-01 RX ORDER — SODIUM CHLORIDE 0.9 % (FLUSH) 0.9 %
3 SYRINGE (ML) INJECTION EVERY 12 HOURS SCHEDULED
Status: DISCONTINUED | OUTPATIENT
Start: 2019-02-01 | End: 2019-02-02 | Stop reason: HOSPADM

## 2019-02-01 RX ADMIN — MIDAZOLAM 2 MG: 1 INJECTION INTRAMUSCULAR; INTRAVENOUS at 11:05

## 2019-02-01 RX ADMIN — FENTANYL CITRATE 25 MCG: 50 INJECTION, SOLUTION INTRAMUSCULAR; INTRAVENOUS at 11:05

## 2019-02-01 RX ADMIN — LIDOCAINE HYDROCHLORIDE 10 ML: 10 INJECTION, SOLUTION INFILTRATION; PERINEURAL at 11:05

## 2019-02-14 LAB
CYTO UR: NORMAL
LAB AP CASE REPORT: NORMAL
PATH REPORT.FINAL DX SPEC: NORMAL
PATH REPORT.GROSS SPEC: NORMAL

## 2019-02-21 ENCOUNTER — APPOINTMENT (OUTPATIENT)
Dept: LAB | Facility: HOSPITAL | Age: 69
End: 2019-02-21

## 2019-02-21 ENCOUNTER — TRANSCRIBE ORDERS (OUTPATIENT)
Dept: ADMINISTRATIVE | Facility: HOSPITAL | Age: 69
End: 2019-02-21

## 2019-02-21 DIAGNOSIS — C95.90 LEUKEMIA NOT HAVING ACHIEVED REMISSION, UNSPECIFIED LEUKEMIA TYPE (HCC): Primary | ICD-10-CM

## 2019-02-21 LAB
DEPRECATED RDW RBC AUTO: 42.5 FL (ref 40–54)
ERYTHROCYTE [DISTWIDTH] IN BLOOD BY AUTOMATED COUNT: 13 % (ref 12–15)
HCT VFR BLD AUTO: 38.3 % (ref 37–47)
HGB BLD-MCNC: 12.7 G/DL (ref 12–16)
HYPOCHROMIA BLD QL: ABNORMAL
LDH SERPL-CCNC: 518 U/L (ref 265–665)
LYMPHOCYTES # BLD MANUAL: 11.61 10*3/MM3 (ref 0.72–4.86)
LYMPHOCYTES NFR BLD MANUAL: 1 % (ref 4–12)
LYMPHOCYTES NFR BLD MANUAL: 88 % (ref 15–45)
MCH RBC QN AUTO: 30.2 PG (ref 28–32)
MCHC RBC AUTO-ENTMCNC: 33.2 G/DL (ref 33–36)
MCV RBC AUTO: 91 FL (ref 82–98)
MONOCYTES # BLD AUTO: 0.13 10*3/MM3 (ref 0.19–1.3)
NEUTROPHILS # BLD AUTO: 1.45 10*3/MM3 (ref 1.87–8.4)
NEUTROPHILS NFR BLD MANUAL: 11 % (ref 39–78)
NRBC SPEC MANUAL: 1 /100 WBC (ref 0–0)
PLAT MORPH BLD: NORMAL
PLATELET # BLD AUTO: 257 10*3/MM3 (ref 130–400)
PMV BLD AUTO: 10 FL (ref 6–12)
POIKILOCYTOSIS BLD QL SMEAR: ABNORMAL
RBC # BLD AUTO: 4.21 10*6/MM3 (ref 4.2–5.4)
SMUDGE CELLS BLD QL SMEAR: ABNORMAL
WBC NRBC COR # BLD: 13.19 10*3/MM3 (ref 4.8–10.8)

## 2019-02-21 PROCEDURE — 83615 LACTATE (LD) (LDH) ENZYME: CPT | Performed by: INTERNAL MEDICINE

## 2019-02-21 PROCEDURE — 82784 ASSAY IGA/IGD/IGG/IGM EACH: CPT | Performed by: INTERNAL MEDICINE

## 2019-02-21 PROCEDURE — 85025 COMPLETE CBC W/AUTO DIFF WBC: CPT | Performed by: INTERNAL MEDICINE

## 2019-02-21 PROCEDURE — 85007 BL SMEAR W/DIFF WBC COUNT: CPT | Performed by: INTERNAL MEDICINE

## 2019-02-21 PROCEDURE — 36415 COLL VENOUS BLD VENIPUNCTURE: CPT

## 2019-02-21 PROCEDURE — 82232 ASSAY OF BETA-2 PROTEIN: CPT | Performed by: INTERNAL MEDICINE

## 2019-02-22 LAB — IGG SERPL-MCNC: 675 MG/DL (ref 700–1600)

## 2019-02-23 LAB — B2 MICROGLOB SERPL-MCNC: 1.5 MG/L (ref 0.6–2.4)

## 2019-02-25 ENCOUNTER — APPOINTMENT (OUTPATIENT)
Dept: LAB | Facility: HOSPITAL | Age: 69
End: 2019-02-25

## 2019-02-25 LAB
COLLECT DATE SP2 STL: NORMAL
COLLECT DATE SP3 STL: NORMAL
COLLECT DATE STL: NORMAL
HEMOCCULT STL QL: NEGATIVE
Lab: 900

## 2019-02-25 PROCEDURE — 82270 OCCULT BLOOD FECES: CPT | Performed by: INTERNAL MEDICINE

## 2019-04-24 ENCOUNTER — TRANSCRIBE ORDERS (OUTPATIENT)
Dept: ADMINISTRATIVE | Facility: HOSPITAL | Age: 69
End: 2019-04-24

## 2019-04-24 ENCOUNTER — APPOINTMENT (OUTPATIENT)
Dept: LAB | Facility: HOSPITAL | Age: 69
End: 2019-04-24

## 2019-04-24 DIAGNOSIS — C91.10 ANEMIA ASSOCIATED WITH CHRONIC LYMPHOCYTIC LEUKEMIA TREATED WITH ERYTHROPOIETIN (HCC): Primary | ICD-10-CM

## 2019-04-24 DIAGNOSIS — D63.0 ANEMIA ASSOCIATED WITH CHRONIC LYMPHOCYTIC LEUKEMIA TREATED WITH ERYTHROPOIETIN (HCC): Primary | ICD-10-CM

## 2019-04-24 LAB
BASOPHILS # BLD MANUAL: 0.16 10*3/MM3 (ref 0–0.2)
BASOPHILS NFR BLD AUTO: 1 % (ref 0–2)
DEPRECATED RDW RBC AUTO: 44.1 FL (ref 40–54)
ERYTHROCYTE [DISTWIDTH] IN BLOOD BY AUTOMATED COUNT: 13.2 % (ref 12–15)
HCT VFR BLD AUTO: 36.1 % (ref 37–47)
HGB BLD-MCNC: 11.9 G/DL (ref 12–16)
LYMPHOCYTES # BLD MANUAL: 8.76 10*3/MM3 (ref 0.72–4.86)
LYMPHOCYTES NFR BLD MANUAL: 54 % (ref 15–45)
LYMPHOCYTES NFR BLD MANUAL: 7 % (ref 4–12)
MCH RBC QN AUTO: 29.8 PG (ref 28–32)
MCHC RBC AUTO-ENTMCNC: 33 G/DL (ref 33–36)
MCV RBC AUTO: 90.5 FL (ref 82–98)
MONOCYTES # BLD AUTO: 1.14 10*3/MM3 (ref 0.19–1.3)
NEUTROPHILS # BLD AUTO: 4.38 10*3/MM3 (ref 1.87–8.4)
NEUTROPHILS NFR BLD MANUAL: 27 % (ref 39–78)
PLATELET # BLD AUTO: 228 10*3/MM3 (ref 130–400)
PMV BLD AUTO: 9.4 FL (ref 6–12)
PROLYMPHOCYTES NFR BLD MANUAL: 2 % (ref 0–0)
RBC # BLD AUTO: 3.99 10*6/MM3 (ref 4.2–5.4)
SMALL PLATELETS BLD QL SMEAR: ADEQUATE
STOMATOCYTES BLD QL SMEAR: ABNORMAL
VARIANT LYMPHS NFR BLD MANUAL: 9 % (ref 0–5)
WBC MORPH BLD: NORMAL
WBC NRBC COR # BLD: 16.22 10*3/MM3 (ref 4.8–10.8)

## 2019-04-24 PROCEDURE — 85007 BL SMEAR W/DIFF WBC COUNT: CPT | Performed by: INTERNAL MEDICINE

## 2019-04-24 PROCEDURE — 85025 COMPLETE CBC W/AUTO DIFF WBC: CPT | Performed by: INTERNAL MEDICINE

## 2019-04-24 PROCEDURE — 36415 COLL VENOUS BLD VENIPUNCTURE: CPT | Performed by: INTERNAL MEDICINE

## 2019-05-31 ENCOUNTER — TRANSCRIBE ORDERS (OUTPATIENT)
Dept: ADMINISTRATIVE | Facility: HOSPITAL | Age: 69
End: 2019-05-31

## 2019-05-31 ENCOUNTER — APPOINTMENT (OUTPATIENT)
Dept: LAB | Facility: HOSPITAL | Age: 69
End: 2019-05-31

## 2019-05-31 DIAGNOSIS — C91.10 CHRONIC LYMPHOCYTIC LEUKEMIA (HCC): Primary | ICD-10-CM

## 2019-05-31 LAB
DEPRECATED RDW RBC AUTO: 44.1 FL (ref 40–54)
EOSINOPHIL # BLD MANUAL: 0.16 10*3/MM3 (ref 0–0.7)
EOSINOPHIL NFR BLD MANUAL: 1 % (ref 0–4)
ERYTHROCYTE [DISTWIDTH] IN BLOOD BY AUTOMATED COUNT: 13.1 % (ref 12–15)
FERRITIN SERPL-MCNC: 35.6 NG/ML (ref 11.1–264)
HCT VFR BLD AUTO: 37.6 % (ref 37–47)
HGB BLD-MCNC: 12.5 G/DL (ref 12–16)
IRON 24H UR-MRATE: 99 MCG/DL (ref 42–180)
IRON SATN MFR SERPL: 34 % (ref 20–45)
LDH SERPL-CCNC: 549 U/L (ref 265–665)
LYMPHOCYTES # BLD MANUAL: 13.83 10*3/MM3 (ref 0.72–4.86)
LYMPHOCYTES NFR BLD MANUAL: 1 % (ref 4–12)
LYMPHOCYTES NFR BLD MANUAL: 84 % (ref 15–45)
MCH RBC QN AUTO: 30.3 PG (ref 28–32)
MCHC RBC AUTO-ENTMCNC: 33.2 G/DL (ref 33–36)
MCV RBC AUTO: 91.3 FL (ref 82–98)
MONOCYTES # BLD AUTO: 0.16 10*3/MM3 (ref 0.19–1.3)
NEUTROPHILS # BLD AUTO: 2.14 10*3/MM3 (ref 1.87–8.4)
NEUTROPHILS NFR BLD MANUAL: 13 % (ref 39–78)
NRBC BLD AUTO-RTO: 0 /100 WBC (ref 0–0.2)
PLAT MORPH BLD: NORMAL
PLATELET # BLD AUTO: 250 10*3/MM3 (ref 130–400)
PMV BLD AUTO: 9.4 FL (ref 6–12)
RBC # BLD AUTO: 4.12 10*6/MM3 (ref 4.2–5.4)
STOMATOCYTES BLD QL SMEAR: ABNORMAL
TIBC SERPL-MCNC: 289 MCG/DL (ref 225–420)
VARIANT LYMPHS NFR BLD MANUAL: 1 % (ref 0–5)
WBC MORPH BLD: NORMAL
WBC NRBC COR # BLD: 16.47 10*3/MM3 (ref 4.8–10.8)

## 2019-05-31 PROCEDURE — 83540 ASSAY OF IRON: CPT | Performed by: INTERNAL MEDICINE

## 2019-05-31 PROCEDURE — 36415 COLL VENOUS BLD VENIPUNCTURE: CPT | Performed by: INTERNAL MEDICINE

## 2019-05-31 PROCEDURE — 82232 ASSAY OF BETA-2 PROTEIN: CPT | Performed by: INTERNAL MEDICINE

## 2019-05-31 PROCEDURE — 82728 ASSAY OF FERRITIN: CPT | Performed by: INTERNAL MEDICINE

## 2019-05-31 PROCEDURE — 83550 IRON BINDING TEST: CPT | Performed by: INTERNAL MEDICINE

## 2019-05-31 PROCEDURE — 82784 ASSAY IGA/IGD/IGG/IGM EACH: CPT | Performed by: INTERNAL MEDICINE

## 2019-05-31 PROCEDURE — 85007 BL SMEAR W/DIFF WBC COUNT: CPT | Performed by: INTERNAL MEDICINE

## 2019-05-31 PROCEDURE — 85025 COMPLETE CBC W/AUTO DIFF WBC: CPT | Performed by: INTERNAL MEDICINE

## 2019-05-31 PROCEDURE — 83615 LACTATE (LD) (LDH) ENZYME: CPT | Performed by: INTERNAL MEDICINE

## 2019-06-01 LAB — IGG SERPL-MCNC: 682 MG/DL (ref 700–1600)

## 2019-06-03 LAB — B2 MICROGLOB SERPL-MCNC: 1.7 MG/L (ref 0.6–2.4)

## 2019-07-05 ENCOUNTER — OFFICE VISIT (OUTPATIENT)
Dept: CARDIOLOGY | Facility: CLINIC | Age: 69
End: 2019-07-05

## 2019-07-05 VITALS
HEIGHT: 65 IN | WEIGHT: 132 LBS | OXYGEN SATURATION: 96 % | BODY MASS INDEX: 21.99 KG/M2 | HEART RATE: 55 BPM | DIASTOLIC BLOOD PRESSURE: 78 MMHG | SYSTOLIC BLOOD PRESSURE: 140 MMHG

## 2019-07-05 DIAGNOSIS — I49.3 PVCS (PREMATURE VENTRICULAR CONTRACTIONS): ICD-10-CM

## 2019-07-05 DIAGNOSIS — R00.2 PALPITATIONS: Primary | ICD-10-CM

## 2019-07-05 DIAGNOSIS — I10 ESSENTIAL HYPERTENSION: ICD-10-CM

## 2019-07-05 PROCEDURE — 99213 OFFICE O/P EST LOW 20 MIN: CPT | Performed by: INTERNAL MEDICINE

## 2019-07-05 PROCEDURE — 93000 ELECTROCARDIOGRAM COMPLETE: CPT | Performed by: INTERNAL MEDICINE

## 2019-07-05 NOTE — PROGRESS NOTES
"  Referring Provider: Harry Hastings MD    Reason for Follow-up Visit: palpitations    Subjective .   Chief Complaint:   Chief Complaint   Patient presents with   • Follow-up     yearly  pt states she is being treated for luekemia by dr. Nelson   • Heart Problem     PVC's with Palpitations.  pt states that she has not felt any palpitations much since on the lopressor.  it keeps the HR down.  she states that she sometimes will feel a \"thump\" but its not bothersome.   • Hypertension     pt states most of the time its ok at home.   • Hyperlipidemia     last labs done 8/2018 LDL 89 takes no cholesterol medication.       History of present illness:  iLset Wright is a 68 y.o. yo female with history of symptomatic PVC's. Much improved on a beta blocker. Denies any CP. Has chronic OHARA        History  Past Medical History:   Diagnosis Date   • Acid reflux    • Arrhythmia    • Arthritis    • Asthma    • Cancer (CMS/HCC)     leukemia   • Hypertension    • Palpitations    ,   Past Surgical History:   Procedure Laterality Date   • CARDIAC CATHETERIZATION     • CHOLECYSTECTOMY     • COLONOSCOPY  10/16/2015    normal   • ENDOSCOPY N/A 11/23/2016    normal   • ENDOSCOPY N/A 12/11/2018    Procedure: ESOPHAGOGASTRODUODENOSCOPY WITH ANESTHESIA;  Surgeon: Grant Kaur DO;  Location: [facility] ENDOSCOPY;  Service: Gastroenterology   • HYSTERECTOMY     ,   Family History   Problem Relation Age of Onset   • Cancer Mother    • Cancer Father         lung   • Cancer Paternal Grandmother         stomach   • No Known Problems Brother    • Colon cancer Neg Hx    • Esophageal cancer Neg Hx    ,   Social History     Tobacco Use   • Smoking status: Never Smoker   • Smokeless tobacco: Never Used   Substance Use Topics   • Alcohol use: No   • Drug use: No   ,     Medications  Current Outpatient Medications   Medication Sig Dispense Refill   • dicyclomine (BENTYL) 10 MG capsule Take 10 mg by mouth 2 (Two) Times a Day.     • esomeprazole " "(nexIUM) 40 MG capsule Take 1 capsule by mouth 2 (Two) Times a Day. 60 capsule 11   • metoprolol tartrate (LOPRESSOR) 100 MG tablet Take 100 mg by mouth 2 (Two) Times a Day.     • valsartan (DIOVAN) 320 MG tablet Take 320 mg by mouth Daily.       No current facility-administered medications for this visit.        Allergies:  Levaquin [levofloxacin]; Phenergan [promethazine hcl]; and Altace [ramipril]    Review of Systems  Review of Systems   Constitution: Positive for malaise/fatigue.   HENT: Negative for nosebleeds.    Cardiovascular: Negative for chest pain, claudication, dyspnea on exertion, irregular heartbeat, leg swelling, near-syncope, orthopnea, palpitations, paroxysmal nocturnal dyspnea and syncope.   Respiratory: Negative for cough, hemoptysis and shortness of breath.    Gastrointestinal: Negative for dysphagia, hematemesis and melena.   Genitourinary: Negative for hematuria.   All other systems reviewed and are negative.      Objective     Physical Exam:  /78 (BP Location: Left arm, Patient Position: Sitting, Cuff Size: Adult)   Pulse 55   Ht 163.8 cm (64.5\")   Wt 59.9 kg (132 lb)   SpO2 96%   BMI 22.31 kg/m²   Physical Exam   Constitutional: She is oriented to person, place, and time. She appears well-developed and well-nourished. No distress.   HENT:   Head: Normocephalic and atraumatic.   Eyes: No scleral icterus.   Neck: Normal range of motion.   Cardiovascular: Normal rate, regular rhythm and normal heart sounds. Exam reveals no gallop and no friction rub.   No murmur heard.  Pulmonary/Chest: Effort normal and breath sounds normal. No respiratory distress. She has no wheezes. She has no rales.   Abdominal: Soft. Bowel sounds are normal. She exhibits no distension. There is no tenderness.   Musculoskeletal: She exhibits no edema.   Neurological: She is alert and oriented to person, place, and time. No cranial nerve deficit.   Skin: Skin is warm and dry. She is not diaphoretic. No erythema. "   Psychiatric: She has a normal mood and affect. Her behavior is normal.       Results Review:    ECG 12 Lead  Date/Time: 7/5/2019 10:14 AM  Performed by: Blayne Hawk MD  Authorized by: Blayne Hawk MD   Comparison: compared with previous ECG   Similar to previous ECG  Rhythm: sinus rhythm  Rate: normal  Conduction: conduction normal  ST Segments: ST segments normal  T Waves: T waves normal  QRS axis: normal  Other findings: left ventricular hypertrophy    Clinical impression: abnormal EKG            Transcribe Orders on 05/31/2019   Component Date Value Ref Range Status   • Iron 05/31/2019 99  42 - 180 mcg/dL Final   • TIBC 05/31/2019 289  225 - 420 mcg/dL Final   • Iron Saturation 05/31/2019 34  20 - 45 % Final   • Ferritin 05/31/2019 35.60  11.10 - 264.00 ng/mL Final   • IgG 05/31/2019 682* 700 - 1600 mg/dL Final   • LDH 05/31/2019 549  265 - 665 U/L Final   • Beta-2 05/31/2019 1.7  0.6 - 2.4 mg/L Final    [employer] Immulite 2000 Immunochemiluminometric assay (ICMA)  Values obtained with different assay methods or kits cannot be used  interchangeably. Results cannot be interpreted as absolute evidence  of the presence or absence of malignant disease.   • WBC 05/31/2019 16.47* 4.80 - 10.80 10*3/mm3 Final   • RBC 05/31/2019 4.12* 4.20 - 5.40 10*6/mm3 Final   • Hemoglobin 05/31/2019 12.5  12.0 - 16.0 g/dL Final   • Hematocrit 05/31/2019 37.6  37.0 - 47.0 % Final   • MCV 05/31/2019 91.3  82.0 - 98.0 fL Final   • MCH 05/31/2019 30.3  28.0 - 32.0 pg Final   • MCHC 05/31/2019 33.2  33.0 - 36.0 g/dL Final   • RDW 05/31/2019 13.1  12.0 - 15.0 % Final   • RDW-SD 05/31/2019 44.1  40.0 - 54.0 fl Final   • MPV 05/31/2019 9.4  6.0 - 12.0 fL Final   • Platelets 05/31/2019 250  130 - 400 10*3/mm3 Final   • nRBC 05/31/2019 0.0  0.0 - 0.2 /100 WBC Final   • Neutrophil % 05/31/2019 13.0* 39.0 - 78.0 % Final   • Lymphocyte % 05/31/2019 84.0* 15.0 - 45.0 % Final   • Monocyte % 05/31/2019 1.0* 4.0 - 12.0 % Final   • Eosinophil %  05/31/2019 1.0  0.0 - 4.0 % Final   • Atypical Lymphocyte % 05/31/2019 1.0  0.0 - 5.0 % Final   • Neutrophils Absolute 05/31/2019 2.14  1.87 - 8.40 10*3/mm3 Final   • Lymphocytes Absolute 05/31/2019 13.83* 0.72 - 4.86 10*3/mm3 Final   • Monocytes Absolute 05/31/2019 0.16* 0.19 - 1.30 10*3/mm3 Final   • Eosinophils Absolute 05/31/2019 0.16  0.00 - 0.70 10*3/mm3 Final   • Stomatocytes 05/31/2019 Mod/2+  None Seen Final   • WBC Morphology 05/31/2019 Normal  Normal Final   • Platelet Morphology 05/31/2019 Normal  Normal Final       Assessment/Plan   Liset was seen today for follow-up, heart problem, hypertension and hyperlipidemia.    Diagnoses and all orders for this visit:    Palpitations due to PVCs (premature ventricular contractions), better on lopressor    Essential hypertension, good control    Other orders  -     ECG 12 Lead        Patient's Body mass index is 22.31 kg/m². BMI is within normal parameters. No follow-up required..

## 2019-07-17 ENCOUNTER — OFFICE VISIT (OUTPATIENT)
Dept: PULMONOLOGY | Facility: CLINIC | Age: 69
End: 2019-07-17

## 2019-07-17 VITALS
SYSTOLIC BLOOD PRESSURE: 120 MMHG | DIASTOLIC BLOOD PRESSURE: 78 MMHG | OXYGEN SATURATION: 96 % | HEART RATE: 54 BPM | BODY MASS INDEX: 22.16 KG/M2 | HEIGHT: 65 IN | WEIGHT: 133 LBS

## 2019-07-17 DIAGNOSIS — J40 BRONCHITIS, NOT SPECIFIED AS ACUTE OR CHRONIC: Primary | ICD-10-CM

## 2019-07-17 DIAGNOSIS — J98.4 SCARRING OF LUNG: ICD-10-CM

## 2019-07-17 LAB
FEV1/FVC: NORMAL %
FEV1: NORMAL LITERS
FVC VOL RESPIRATORY: NORMAL LITERS

## 2019-07-17 PROCEDURE — 94010 BREATHING CAPACITY TEST: CPT | Performed by: INTERNAL MEDICINE

## 2019-07-17 PROCEDURE — 99213 OFFICE O/P EST LOW 20 MIN: CPT | Performed by: INTERNAL MEDICINE

## 2019-07-17 NOTE — PATIENT INSTRUCTIONS
I didadvise the patient that her pulmonary functions are reasonably stable.  She is doing well clinically from a pulmonary standpoint.  She has been recently diagnosed with CLL and is being followed by Dr. Dumont for this.  I will see her back in 1 year with a flow volume loop on return.

## 2019-07-18 NOTE — PROGRESS NOTES
Subjective   Liset Wright is a 68 y.o. female.     Chief Complaint   Patient presents with   • Shortness of Breath      No My Sticky Note on file.    History of Present Illness   The patient was recently diagnosed with CLL.  She is being followed by Dr. Julia Steward for this.  She had problems with just being fatigued and laboratory studies revealed elevated white count and subsequent evaluation confirmed CLL.  She complains of mild dyspnea at present but if there is been no change in her pattern of dyspnea since her last visit.  She presently does not have any wheezing.    Medical/Family/Social History   has a past medical history of Acid reflux, Arrhythmia, Arthritis, Asthma, Cancer (CMS/HCC), Hypertension, and Palpitations.   has a past surgical history that includes Hysterectomy; Cholecystectomy; Esophagogastroduodenoscopy (N/A, 11/23/2016); Cardiac catheterization; Colonoscopy (10/16/2015); and Esophagogastroduodenoscopy (N/A, 12/11/2018).  family history includes Cancer in her father, mother, and paternal grandmother; No Known Problems in her brother.   reports that she has never smoked. She has never used smokeless tobacco. She reports that she does not drink alcohol or use drugs.  Allergies   Allergen Reactions   • Levaquin [Levofloxacin] Paresthesia   • Phenergan [Promethazine Hcl] Other (See Comments)     Jerky movements   • Altace [Ramipril] Palpitations     Medications    Current Outpatient Medications:   •  dicyclomine (BENTYL) 10 MG capsule, Take 10 mg by mouth 2 (Two) Times a Day., Disp: , Rfl:   •  esomeprazole (nexIUM) 40 MG capsule, Take 1 capsule by mouth 2 (Two) Times a Day., Disp: 60 capsule, Rfl: 11  •  metoprolol tartrate (LOPRESSOR) 100 MG tablet, Take 100 mg by mouth 2 (Two) Times a Day., Disp: , Rfl:   •  valsartan (DIOVAN) 320 MG tablet, Take 320 mg by mouth Daily., Disp: , Rfl:     Review of Systems   Constitutional: Positive for fatigue. Negative for chills and fever.   HENT:  "Negative for congestion.    Eyes: Negative for visual disturbance.   Respiratory: Positive for shortness of breath. Negative for cough.         Her shortness of breath is quite mild.   Cardiovascular: Negative for chest pain.   Gastrointestinal: Negative for diarrhea, nausea and vomiting.   Genitourinary: Negative for difficulty urinating.   Musculoskeletal: Negative for arthralgias.   Skin: Negative for rash.   Neurological: Negative for dizziness and speech difficulty.   Hematological: Negative for adenopathy.   Psychiatric/Behavioral: The patient is not nervous/anxious.      ------------------------------------  Objective   /78   Pulse 54   Ht 163.8 cm (64.5\")   Wt 60.3 kg (133 lb)   SpO2 96% Comment: Ra  Breastfeeding? No   BMI 22.48 kg/m²   Physical Exam   Constitutional: She is oriented to person, place, and time. She appears well-developed and well-nourished.   HENT:   Head: Normocephalic and atraumatic.   Eyes: EOM are normal. Pupils are equal, round, and reactive to light.   Neck: Normal range of motion. Neck supple.   Cardiovascular: Normal rate, regular rhythm and normal heart sounds.   Pulmonary/Chest: Effort normal.   She has reasonable air movement bilaterally and no adventitious sounds are heard.   Abdominal: Soft.   Musculoskeletal: Normal range of motion.   Neurological: She is alert and oriented to person, place, and time.   Skin: Skin is warm and dry.   Psychiatric: She has a normal mood and affect.   Nursing note and vitals reviewed.          Pulmonary Functions Testing Results:  FEV1   Date Value Ref Range Status   07/17/2019 60% liters Final     FVC   Date Value Ref Range Status   07/17/2019 75% liters Final     FEV1/FVC   Date Value Ref Range Status   07/17/2019 63.56% % Final      My interpretation of PFT:  1.  Spirometry is consistent with a moderate obstructive ventilatory defect.  2.  Current studies show a slight drop in her FVC and also a drop in her FEV1 compared to previous " studies from 2018 although she remains in the moderate category regarding her airflow obstruction.  Assessment/Plan   Liset was seen today for shortness of breath.    Diagnoses and all orders for this visit:    Bronchitis, not specified as acute or chronic  -     Pulmonary Function Test    Scarring of lung      Patient's Body mass index is 22.48 kg/m². BMI is within normal parameters. No follow-up required..      I do think that her airflow obstruction relates predominately to her history of asthma.  She is a non-smoker and I think it is unlikely has COPD.  If she does have problems with wheezing or increasing dyspnea future we can certainly add inhaled medications.  Otherwise I will see her back in 1 year with a flow volume loop on return.

## 2019-08-21 ENCOUNTER — APPOINTMENT (OUTPATIENT)
Dept: LAB | Facility: HOSPITAL | Age: 69
End: 2019-08-21

## 2019-08-21 ENCOUNTER — TRANSCRIBE ORDERS (OUTPATIENT)
Dept: ADMINISTRATIVE | Facility: HOSPITAL | Age: 69
End: 2019-08-21

## 2019-08-21 DIAGNOSIS — C91.10 CHRONIC LYMPHOCYTIC LEUKEMIA, RAI STAGE 0 (HCC): Primary | ICD-10-CM

## 2019-08-21 LAB
DEPRECATED RDW RBC AUTO: 42 FL (ref 37–54)
ERYTHROCYTE [DISTWIDTH] IN BLOOD BY AUTOMATED COUNT: 12.7 % (ref 12.3–15.4)
FERRITIN SERPL-MCNC: 53.2 NG/ML (ref 11.1–264)
HCT VFR BLD AUTO: 37.2 % (ref 34–46.6)
HGB BLD-MCNC: 12.5 G/DL (ref 12–15.9)
IRON 24H UR-MRATE: 101 MCG/DL (ref 42–180)
IRON SATN MFR SERPL: 34 % (ref 20–45)
LDH SERPL-CCNC: 599 U/L (ref 265–665)
LYMPHOCYTES # BLD MANUAL: 9.34 10*3/MM3 (ref 0.7–3.1)
LYMPHOCYTES NFR BLD MANUAL: 6 % (ref 5–12)
LYMPHOCYTES NFR BLD MANUAL: 67 % (ref 19.6–45.3)
MCH RBC QN AUTO: 30.5 PG (ref 26.6–33)
MCHC RBC AUTO-ENTMCNC: 33.6 G/DL (ref 31.5–35.7)
MCV RBC AUTO: 90.7 FL (ref 79–97)
MONOCYTES # BLD AUTO: 0.84 10*3/MM3 (ref 0.1–0.9)
NEUTROPHILS # BLD AUTO: 3.62 10*3/MM3 (ref 1.7–7)
NEUTROPHILS NFR BLD MANUAL: 26 % (ref 42.7–76)
PLAT MORPH BLD: NORMAL
PLATELET # BLD AUTO: 233 10*3/MM3 (ref 140–450)
PMV BLD AUTO: 9.7 FL (ref 6–12)
RBC # BLD AUTO: 4.1 10*6/MM3 (ref 3.77–5.28)
RBC MORPH BLD: NORMAL
TIBC SERPL-MCNC: 295 MCG/DL (ref 225–420)
VARIANT LYMPHS NFR BLD MANUAL: 1 % (ref 0–5)
WBC MORPH BLD: NORMAL
WBC NRBC COR # BLD: 13.94 10*3/MM3 (ref 3.4–10.8)

## 2019-08-21 PROCEDURE — 82784 ASSAY IGA/IGD/IGG/IGM EACH: CPT | Performed by: INTERNAL MEDICINE

## 2019-08-21 PROCEDURE — 82728 ASSAY OF FERRITIN: CPT | Performed by: INTERNAL MEDICINE

## 2019-08-21 PROCEDURE — 83540 ASSAY OF IRON: CPT | Performed by: INTERNAL MEDICINE

## 2019-08-21 PROCEDURE — 85007 BL SMEAR W/DIFF WBC COUNT: CPT | Performed by: INTERNAL MEDICINE

## 2019-08-21 PROCEDURE — 36415 COLL VENOUS BLD VENIPUNCTURE: CPT | Performed by: INTERNAL MEDICINE

## 2019-08-21 PROCEDURE — 82232 ASSAY OF BETA-2 PROTEIN: CPT | Performed by: INTERNAL MEDICINE

## 2019-08-21 PROCEDURE — 85025 COMPLETE CBC W/AUTO DIFF WBC: CPT | Performed by: INTERNAL MEDICINE

## 2019-08-21 PROCEDURE — 83615 LACTATE (LD) (LDH) ENZYME: CPT | Performed by: INTERNAL MEDICINE

## 2019-08-21 PROCEDURE — 83550 IRON BINDING TEST: CPT | Performed by: INTERNAL MEDICINE

## 2019-08-22 LAB — IGG SERPL-MCNC: 729 MG/DL (ref 700–1600)

## 2019-08-23 LAB — B2 MICROGLOB SERPL-MCNC: 1.3 MG/L (ref 0.6–2.4)

## 2019-08-29 ENCOUNTER — OFFICE VISIT (OUTPATIENT)
Dept: ONCOLOGY | Facility: CLINIC | Age: 69
End: 2019-08-29

## 2019-08-29 VITALS
HEART RATE: 49 BPM | OXYGEN SATURATION: 98 % | SYSTOLIC BLOOD PRESSURE: 130 MMHG | RESPIRATION RATE: 16 BRPM | DIASTOLIC BLOOD PRESSURE: 80 MMHG | WEIGHT: 134.9 LBS | HEIGHT: 65 IN | BODY MASS INDEX: 22.48 KG/M2 | TEMPERATURE: 98.2 F

## 2019-08-29 DIAGNOSIS — C91.10 CLL (CHRONIC LYMPHOCYTIC LEUKEMIA) (HCC): Primary | ICD-10-CM

## 2019-08-29 PROCEDURE — 99215 OFFICE O/P EST HI 40 MIN: CPT | Performed by: INTERNAL MEDICINE

## 2019-08-29 NOTE — PROGRESS NOTES
[facility] GROUP HEMATOLOGY AND ONCOLOGY  [address]  [facility] [address] [phone]    Patient Name: Liset Wright  Encounter Date: 08/29/2019  YOB: 1950  Patient Number: [id number]      REASON FOR VISIT:  Liset Wright is a 68-year-old female who returns in follow-up of newly diagnosed Stage 0 chronic lymphocytic leukemia (B-CLL). She is seen 7 months since her initial visit on 01/28/2019. She remains in observation. She is here with her son and daughter-in-law.     DIAGNOSTIC ABNORMALITIES:   1. On 11/28/2018, labs showed a WBC of 13.9 with 78% lymphocytes (ALC 10.9), ANC 2.4, and was otherwise normal. Hemoglobin 12, hematocrit 36.7, MCV 87, platelets 261,000. CMP was normal in its entirety to include a BUN of 11, creatinine 0.78, GFR 79, calcium 9.2, total protein 6.8, and normal liver enzymes.  2. On 01/11/2019, CT of the abdomen and pelvis with IV contrast at [facility] showed no acute pathology in the abdomen or pelvis (no masses or any abnormalities to account for her left upper quadrant pain with no adenopathy and normal spleen).  3. On 01/16/2019, Ainsley-Barr virus titers showed an IgG level of 239 (0 - 17.9), Ainsley-Barr virus nuclear antigen IgG 118 (0 - 17.9), but IgM of less than 36 and early antigen of less than 9 (indicating prior infection).  4. On 01/10/2019, repeat CBC showed a hemoglobin of 11.3, hematocrit 35.5, MCV 91.3, platelets 249,000, WBC 13.29.   5. On 01/14/2019, blood smear (manual) review showed atypical lymphocytosis, moderate smudge cells present. RBC morphology showed normocytic, normochromic anemia without significant morphologic abnormality. Platelets normal morphology.  6. On 01/17/2019, peripheral blood was sent for flow cytometry ([employer]). This revealed chronic lymphocytic leukemia (54% of total cells). Monoclonal B cells have a CLL immunophenotype and are negative  for both CD38 and ZAP-70 associated with standard risk disease. PCR for IGVH and p53 mutation and FISH for CLL may provide additional prognostic information. Virtually all of the B cells are monoclonal and express CD19 (dim), CD20 (dim), CD5, CD23, CD11c, and dim surface kappa light chain. They are negative for CD10, CD38, FMC-7, ZAP-70, and surface lambda light chain.   7. Labs, 01/28/2019: Hemoglobin 13.1, hematocrit 39.5, MCV 90.5, platelets 283,000, WBC 12.2 with 19.3 segs (ANC 2.4), 75.8 lymphocytes (ALC 9.2). CMP normal. GFR 85, calcium 9.9, total protein 7.1, and normal liver enzymes.  (265 - 665). Beta 2 microglobulin 1.3. Serum iron 101, TIBC 333, iron saturation 30%, B12 of 431, folate 15 (each within reference range). Ferritin 23.7 (depressed). HIV screen negative. IgG 742.  8. Bone marrow biopsy/aspirate, 02/01/2019: PERIPHERAL BLOOD: B cell chronic lymphocytic leukemia. BONE MARROW, UNSPECIFIED SITE, SMEARS, CLOT AND BIOPSY: Involved by B cell chronic lymphocytic leukemia (30%). CLL panel: Positive for a deletion of DLEUI, DILEU2 on chromosome 13 at 04 (58,0% of cells), consistent with CLL. Isolated del 1304.3 is associated with a favorable clinical course. Three of the twenty mitotic cells examined were characterized by loss of one copy of the X chromosome and a deletion in the long arm of chromosome 13.   9. Stools OB x 3, 02/25/2019. Negative x 3    PREVIOUS INTERVENTIONS:   1. Observation        Problem List Items Addressed This Visit        Hematopoietic and Hemostatic    CLL (chronic lymphocytic leukemia) (CMS/HCC) - Primary         No history exists.       PAST MEDICAL HISTORY:  ALLERGIES:  Allergies   Allergen Reactions   • Levaquin [Levofloxacin] Paresthesia   • Phenergan [Promethazine Hcl] Other (See Comments)     Jerky movements   • Altace [Ramipril] Palpitations     CURRENT MEDICATIONS:  Outpatient Encounter Medications as of 8/29/2019   Medication Sig Dispense Refill   • dicyclomine  "(BENTYL) 10 MG capsule Take 10 mg by mouth 2 (Two) Times a Day.     • esomeprazole (nexIUM) 40 MG capsule Take 1 capsule by mouth 2 (Two) Times a Day. 60 capsule 11   • metoprolol tartrate (LOPRESSOR) 100 MG tablet Take 100 mg by mouth 2 (Two) Times a Day.     • valsartan (DIOVAN) 320 MG tablet Take 320 mg by mouth Daily.       No facility-administered encounter medications on file as of 8/29/2019.      ADULT ILLNESSES:   Chronic lymphocytic leukemia ( ICD-10:C91.10 ;Chronic lymphocytic leukemia of B-cell type not having achieved remission   Abdominal pain ( ICD-10:R10.12 ;Left upper quadrant pain   Asthma ( Dr. Johnson; ICD-10:J45.909 ;Unspecified asthma, uncomplicated )   Cervical degenerative disk disease ( x-ray 11/2017; ICD-10:M50.20 ;Other cervical disc displacement, unspecified cervical region )   Erosive gastritis ( Dr. Kaur; ICD-10:K29.60 ;Other gastritis without bleeding )   Gastroesophageal reflux disease ( GERD, Dr. Kaur; ICD-10:K21.9 ;Gastro-esophageal reflux disease without esophagitis )   Gastroparesis ( ICD-10:K31.84 ;Gastroparesis   Hypertension ( ICD-10:I10 ;Essential (primary) hypertension   Irritable bowel syndrome ( ICD-10:K58.9 ;Irritable bowel syndrome without diarrhea   Migraines ( ICD-10:G43.909 ;Migraine, unspecified, not intractable, without status migrainosus   Normocytic anemia ( ICD-10:D64.9 ;Anemia, unspecified   Palpitations ( normal heart cath, 02/2003, bilateral ultrasound showed no significant stenosis, 06/2015, stress echo 05/2018 normal; ICD-10:R00.2 ;Palpitations )   Schatzki esophageal ring ( erosive gastritis/gastroesophageal reflux disease, Dr. Kaur; ICD-10:K22.2 ;Esophageal obstruction )    SURGERIES:   Cholecystectomy   Cardiac catheterization, 02/2003   Colonoscopy, 2017. \"No polyps.\" 5 years. Dr. Kaur   EGD (upper endoscopy), 2018, normal, Dr. Kaur   Hysterectomy, total      ADULT ILLNESSES:  Patient Active Problem List   Diagnosis Code   • Palpitations R00.2   • " PVCs (premature ventricular contractions) I49.3   • Essential hypertension I10   • Epigastric pain R10.13   • CLL (chronic lymphocytic leukemia) (CMS/HCC) C91.90     SURGERIES:  Past Surgical History:   Procedure Laterality Date   • CARDIAC CATHETERIZATION     • CHOLECYSTECTOMY     • COLONOSCOPY  10/16/2015    normal   • ENDOSCOPY N/A 11/23/2016    normal   • ENDOSCOPY N/A 12/11/2018    Procedure: ESOPHAGOGASTRODUODENOSCOPY WITH ANESTHESIA;  Surgeon: Grant Kaur DO;  Location[facility] END[facility]COPY;  Service: Gastroenterology   • HYSTERECTOMY       HEALTH MAINTENANCE ITEMS:  Health Maintenance Due   Topic Date Due   • TDAP/TD VACCINES (1 - Tdap) 09/09/1971   • ZOSTER VACCINE (1 of 2) 09/09/2002   • HEPATITIS C SCREENING  04/26/2017   • LIPID PANEL  08/23/2019   • INFLUENZA VACCINE  08/01/2019       <no information>  Last Completed Colonoscopy       Status Date      COLONOSCOPY Done 10/16/2015 SCANNED - COLONOSCOPY     Patient has more history with this topic...        Immunization History   Administered Date(s) Administered   • Flu Vaccine Quad PF >18YRS 10/01/2018   • Pneumococcal Polysaccharide (PPSV23) 10/01/2018     Last Completed Mammogram       Status Date      MAMMOGRAM Done 11/7/2017 Ext Proc: CO Scr mammo bi incl cad     Patient has more history with this topic...            FAMILY HISTORY:  Family History   Problem Relation Age of Onset   • Cancer Mother    • Cancer Father         lung   • Cancer Paternal Grandmother         stomach   • No Known Problems Brother    • Colon cancer Neg Hx    • Esophageal cancer Neg Hx      SOCIAL HISTORY:  Social History     Socioeconomic History   • Marital status:      Spouse name: Not on file   • Number of children: Not on file   • Years of education: Not on file   • Highest education level: Not on file   Tobacco Use   • Smoking status: Never Smoker   • Smokeless tobacco: Never Used   Substance and Sexual Activity   • Alcohol use: No   • Drug use: No   • Sexual  "activity: Defer       REVIEW OF SYSTEMS:  Constitutional:   The patient's appetite is good but energy is chronically low. \"I'm tired but still manage it all.\"  She manages her ADLs including chores, errands and working part time.  She still drives.  She has gained 2 pounds (in addition to 4 pounds at her prior visit) since her last visit. She has no fevers, chills, or drenching night sweats. Her sleep habits seem appropriate.  Ear/Nose/Throat/Mouth:   She reports no ear pains, sinus symptoms, sore throat, nosebleeds, or sore tongue. She has migraine headaches. She denies any hoarseness, change in voice quality, or hemoptysis.   Ocular:   She reports no eye pain, significant change in visual acuity, double vision, or blurry vision.  Respiratory:   She reports some exertional dyspnea from possible asthma but no chronic cough, significant shortness of breathing at rest, phlegm production, or unexplained chest wall pain. Says prior PFTs suggest \"maybe asthma\"   Cardiovascular:   She reports no exertional chest pain, chest pressure, or chest heaviness. She reports no claudication. She reports no palpitations or symptomatic orthostasis.  Gastrointestinal:   She reports no dysphagia, nausea, vomiting, postprandial abdominal pain, bloating, cramping, change in bowel habits, with dark (OTC iron) discoloration of the stool. She reports no rectal bleeding. She reports occasional but chronic constipation requiring stool softeners, lifelong. She has no diarrhea.  Genitourinary:   She reports no urinary burning, frequency, dribbling, or discoloration. She reports no difficulty controlling her bladder. She has no need to urinate frequently through the night.   Musculoskeletal:   She reports no unexplained arthralgias, myalgias, or nighttime leg cramping.  Extremities:   She reports no trouble with fluid retention or significant leg swelling.  Endocrine:   She reports no problems with excess thirst, excessive urination, vasomotor " "instability, or unexplained fatigue.  Heme/Lymphatic:   She reports no unexplained bleeding, bruising, petechial rashes, or swollen glands.  Skin:   She reports no itching, rashes, or lesions which won't heal.  Neuro:   She reports no loss of consciousness, seizures, fainting spells, or dizziness. She reports no weakness of face, arms, or legs. She has no difficulty with speech. She has no tremors. She admits to occasional paresthesias of the hands.   Psych:   She seems generally satisfied with life. She denies depression. She reports no mood swings.         VITAL SIGNS: /80   Pulse (!) 49   Temp 98.2 °F (36.8 °C) (Tympanic)   Resp 16   Ht 163.8 cm (64.5\")   Wt 61.2 kg (134 lb 14.4 oz)   SpO2 98%   Breastfeeding? No   BMI 22.80 kg/m² Body surface area is 1.66 meters squared.  Pain Score    08/29/19 1401   PainSc:   4         PHYSICAL EXAMINATION:   General:   She is a slender but otherwise well-developed, well-nourished, and modestly-kept female who is comfortable at rest. She arrived in the exam room ambulatory. She appears to be her stated age. Her skin color is normal. ECOG 0  Head/Neck:   The patient is anicteric and atraumatic. The mouth and throat are clear. The trachea is midline. The neck is supple without evidence of jugular venous distention or cervical adenopathy.   Eyes:   The pupils are equal, round, and reactive to light. The extraocular movements are full. There is no scleral jaundice or erythema.   Chest:   The respiratory efforts are normal and unhindered. The chest is clear to auscultation and percussion. There are no wheezes, rhonchi, rales, or asymmetry of breath sounds.  Cardiovascular:   The patient has a regular cardiac rate and rhythm without murmurs, rubs, or gallops. The peripheral pulses are equal and full.  Abdomen:   The belly is soft and flat. There is no rebound or guarding. There is no organomegaly, mass-effect, or tenderness. Bowel sounds are active and of normal " character.  Extremities:   There is no evidence of cyanosis, clubbing, or edema.  Rheumatologic:   There is no overt evidence of rheumatoid deformities of the hands. There is no sausaging of the fingers. There is no sign of active synovitis. The gait is normal.  Cutaneous:   There are no overt rashes, disseminated lesions, purpura, or petechiae.   Lymphatics:   There is no evidence of adenopathy in the cervical, supraclavicular, axillary, inguinal, or femoral areas. There is no splenomegaly.  Neurologic:   The patient is alert, oriented, cooperative, and pleasant. She is appropriately conversant. She ambulated into the exam room without assistance and transferred from chair to exam table unaided. There is no overt dysfunction of the motor, sensory, cerebellar systems.  Psych:   Mood and affect are appropriate for circumstance. Eye contact is appropriate. Normal judgement and decision making.         LABS    Lab Results - Last 18 Months   Lab Units 08/21/19  0821 05/31/19  0749 04/24/19  1328 02/21/19  0815 02/01/19  0850 02/01/19  0848   HEMOGLOBIN g/dL 12.5 12.5 11.9* 12.7 12.0  --    HEMATOCRIT % 37.2 37.6 36.1* 38.3 36.0*  --    MCV fL 90.7 91.3 90.5 91.0 89.6  --    WBC 10*3/mm3 13.94* 16.47* 16.22* 13.19* 14.50*  --    RDW % 12.7 13.1 13.2 13.0 13.1  --    MPV fL 9.7 9.4 9.4 10.0 10.5  --    PLATELETS 10*3/mm3 233 250 228 257 266 266   NEUTROS ABS 10*3/mm3 3.62 2.14 4.38 1.45* 4.93  --    EOS ABS 10*3/mm3  --  0.16  --   --  0.30  --    BASOS ABS 10*3/mm3  --   --  0.16  --  0.16  --    NRBC /100 WBC  --  0.0  --  1.0*  --   --    NEUTROPHIL % % 26.0* 13.0* 27.0* 11.0* 34.0*  --    MONOCYTES % % 6.0 1.0* 7.0 1.0* 3.2*  --    BASOPHIL % %  --   --  1.0  --  1.1  --    ATYP LYMPH % % 1.0 1.0 9.0*  --   --   --        Lab Results - Last 18 Months   Lab Units 01/28/19  1400   GLUCOSE mg/dL 95   SODIUM mmol/L 140   POTASSIUM mmol/L 5.0   CO2 mmol/L 30.0   CHLORIDE mmol/L 101   ANION GAP mmol/L 9.0   CREATININE mg/dL  0.69   BUN mg/dL 13   BUN / CREAT RATIO  18.8   CALCIUM mg/dL 9.9   EGFR IF NONAFRICN AM mL/min/1.73 85   ALK PHOS U/L 78   TOTAL PROTEIN g/dL 7.1   ALT (SGPT) U/L 27   AST (SGOT) U/L 30   BILIRUBIN mg/dL 0.5   ALBUMIN g/dL 4.40   GLOBULIN gm/dL 2.7       Lab Results - Last 18 Months   Lab Units 19  0821 19  0749 19  0815 19  1400 19  1302   LDH U/L 599 549 518 539  --    REFERENCE LAB REPORT   --   --   --   --  See attached result       Lab Results - Last 18 Months   Lab Units 19  0821 19  0749 19  1400   IRON mcg/dL 101 99 101   TIBC mcg/dL 295 289 333   IRON SATURATION % 34 34 30   FERRITIN ng/mL 53.20 35.60 23.70   FOLATE ng/mL  --   --  15.00       ASSESSMENT:   1. B cell chronic lymphocytic leukemia (B-CLL):   Stage: 0   Tumor [place]: Lymphocytosis.   Complications of Tumor: None.   Tumor Status: Untreated.   IVGH and p53 mutations: Pending.   CD38: Negative.   ZAP-70: Negative.   Isolated del 13q14.3 (favorable)   LDH: 599, 2019 (prior: 518 - 549)   B2M: 1.3, 2019 (prior: 1.3 - 1.7)   Ig, 2019 (prior: 675 - 742)  2. Normocytic anemia.  Low ferritin - 53.2,  (from 35; from 23). Improved, Hgb 12.5; MCV 91.3 on  (prior: Hgb 11.3 - 13.3; MCV 87 - 91)   3. Irritable bowel syndrome.   4. Gastroparesis.   5. Migraines.   6. Cervical degenerative disk disease.    RECOMMENDATIONS:   1.  Re: Apprised of labs from 2019, Stable lymphocytosis, normal Hgb, normal platelets, low ferritin (< 100) but otherwise repleted iron levels, normal B2M, normal LDH, stable IgG (> 500).  2. Previously discussed the labs from  and 2019 stable low grade leukocytosis and lymphocytosis (greater than 5000) normal Hgb and normal platelets, normal CMP, normal LDH, normal B2M, repleted serum iron, repleted (> 20%) fe sat, low (< 100) ferritin, repleted B12/folate, negative HIV screen, IgG > 500.   3. Previously discussed the bone marrow  "biopsy, 02/01 (above). Confirms B-CLL with del 13q14 (favorable)   4. Rx: OTC iron po daily   5. B- CLL again discussed. I had explained that standard therapy has not appreciably altered the nature history of CLL and survival curves demonstrate that CLL is an incurable disease. Randomized studies have failed to show a survival advantage of immediate treatment over delayed treatment for asymptomatic patients with early stage disease. As a result, treatment is generally reserved until the patient has active disease defined as the presence of disease-related symptoms: Weight loss greater than 10%; extreme fatigue; fever greater than 100.5 for 2 weeks with night sweats without evidence of infection (\"B\" symptoms); worsening cytopenias (HGB less than 11; platelets less than 100,000); unexplained recurrent infections; worsening adenopathy, or splenomegaly. She is aware to call should she develop any of these symptoms.   6. Continue currently identified medications.   7. Continue ongoing management per primary care.   8. Advance Directive discussed and forms given - initially discussed on 01/23/2019.   9. Return to the [place] office in 24 weeks with pre-office serum iron, Fe sat, ferritin, CBC and differential, IgG, LDH, B2M.    QUALITY MEASURES:   MEDICAL DECISION MAKING: Moderate Complexity   AMOUNT OF DATA: Moderate   RISK OF COMPLICATIONS: Low     TIME SPENT: Face-to-face time on this encounter, as defined by the American Medical Association in the 2019 Current Procedural Terminology codebook; assessment, record review, lab review, planning and education - 41 minutes (greater than 50% face-to-face).     cc: Harry Hastings MD     "

## 2019-11-25 ENCOUNTER — APPOINTMENT (OUTPATIENT)
Dept: LAB | Facility: HOSPITAL | Age: 69
End: 2019-11-25

## 2019-11-25 ENCOUNTER — HOSPITAL ENCOUNTER (OUTPATIENT)
Dept: GENERAL RADIOLOGY | Facility: HOSPITAL | Age: 69
Discharge: HOME | End: 2019-11-25
Admitting: PHYSICIAN ASSISTANT

## 2019-11-25 ENCOUNTER — TRANSCRIBE ORDERS (OUTPATIENT)
Dept: ADMINISTRATIVE | Facility: HOSPITAL | Age: 69
End: 2019-11-25

## 2019-11-25 DIAGNOSIS — C91.10 LEUKEMIA, LYMPHOCYTIC, CHRONIC (HCC): ICD-10-CM

## 2019-11-25 DIAGNOSIS — R05.9 COUGH: ICD-10-CM

## 2019-11-25 DIAGNOSIS — R50.9 FEVER, UNSPECIFIED: Primary | ICD-10-CM

## 2019-11-25 DIAGNOSIS — R09.1 PLEURISY: ICD-10-CM

## 2019-11-25 LAB
ALBUMIN SERPL-MCNC: 4.2 G/DL (ref 3.5–5.2)
ALBUMIN/GLOB SERPL: 1.4 G/DL
ALP SERPL-CCNC: 158 U/L (ref 39–117)
ALT SERPL W P-5'-P-CCNC: 56 U/L (ref 1–33)
ANION GAP SERPL CALCULATED.3IONS-SCNC: 11 MMOL/L (ref 5–15)
AST SERPL-CCNC: 74 U/L (ref 1–32)
BILIRUB SERPL-MCNC: 0.7 MG/DL (ref 0.2–1.2)
BUN BLD-MCNC: 13 MG/DL (ref 8–23)
BUN/CREAT SERPL: 18.8 (ref 7–25)
CALCIUM SPEC-SCNC: 9.1 MG/DL (ref 8.6–10.5)
CHLORIDE SERPL-SCNC: 98 MMOL/L (ref 98–107)
CO2 SERPL-SCNC: 29 MMOL/L (ref 22–29)
CREAT BLD-MCNC: 0.69 MG/DL (ref 0.57–1)
DEPRECATED RDW RBC AUTO: 43.9 FL (ref 37–54)
ERYTHROCYTE [DISTWIDTH] IN BLOOD BY AUTOMATED COUNT: 13 % (ref 12.3–15.4)
GFR SERPL CREATININE-BSD FRML MDRD: 85 ML/MIN/1.73
GLOBULIN UR ELPH-MCNC: 3.1 GM/DL
GLUCOSE BLD-MCNC: 127 MG/DL (ref 65–99)
HCT VFR BLD AUTO: 34.5 % (ref 34–46.6)
HGB BLD-MCNC: 11.4 G/DL (ref 12–15.9)
LYMPHOCYTES # BLD MANUAL: 8.73 10*3/MM3 (ref 0.7–3.1)
LYMPHOCYTES NFR BLD MANUAL: 36.4 % (ref 19.6–45.3)
LYMPHOCYTES NFR BLD MANUAL: 5.1 % (ref 5–12)
MCH RBC QN AUTO: 30.4 PG (ref 26.6–33)
MCHC RBC AUTO-ENTMCNC: 33 G/DL (ref 31.5–35.7)
MCV RBC AUTO: 92 FL (ref 79–97)
MONOCYTES # BLD AUTO: 1.22 10*3/MM3 (ref 0.1–0.9)
NEUTROPHILS # BLD AUTO: 13.09 10*3/MM3 (ref 1.7–7)
NEUTROPHILS NFR BLD MANUAL: 49.5 % (ref 42.7–76)
NEUTS BAND NFR BLD MANUAL: 5.1 % (ref 0–5)
PLAT MORPH BLD: NORMAL
PLATELET # BLD AUTO: 264 10*3/MM3 (ref 140–450)
PMV BLD AUTO: 9.2 FL (ref 6–12)
POTASSIUM BLD-SCNC: 3.5 MMOL/L (ref 3.5–5.2)
PROT SERPL-MCNC: 7.3 G/DL (ref 6–8.5)
RBC # BLD AUTO: 3.75 10*6/MM3 (ref 3.77–5.28)
RBC MORPH BLD: NORMAL
SMUDGE CELLS BLD QL SMEAR: ABNORMAL
SODIUM BLD-SCNC: 138 MMOL/L (ref 136–145)
VARIANT LYMPHS NFR BLD MANUAL: 4 % (ref 0–5)
WBC NRBC COR # BLD: 23.99 10*3/MM3 (ref 3.4–10.8)

## 2019-11-25 PROCEDURE — 85007 BL SMEAR W/DIFF WBC COUNT: CPT | Performed by: PHYSICIAN ASSISTANT

## 2019-11-25 PROCEDURE — 71046 X-RAY EXAM CHEST 2 VIEWS: CPT

## 2019-11-25 PROCEDURE — 36415 COLL VENOUS BLD VENIPUNCTURE: CPT | Performed by: PHYSICIAN ASSISTANT

## 2019-11-25 PROCEDURE — 85025 COMPLETE CBC W/AUTO DIFF WBC: CPT | Performed by: PHYSICIAN ASSISTANT

## 2019-11-25 PROCEDURE — 80053 COMPREHEN METABOLIC PANEL: CPT | Performed by: PHYSICIAN ASSISTANT

## 2019-11-26 ENCOUNTER — TELEPHONE (OUTPATIENT)
Dept: ONCOLOGY | Facility: CLINIC | Age: 69
End: 2019-11-26

## 2019-11-26 NOTE — TELEPHONE ENCOUNTER
Patient called she was told to give our office a call if she got a fever, said said it has been 105. She did have a chest x ray done yesterday and she does have pneumonia. But she was concerned that she needed to let Dr. Cedeno know.

## 2019-11-26 NOTE — TELEPHONE ENCOUNTER
Spoke with patient and she is being treated for the pnemonia. I let her know that I would make a note for Dr. Cedeno to see so that he knows what is going on. Liset is taking a antibiotic and will be following up with her PCP

## 2020-02-27 ENCOUNTER — LAB (OUTPATIENT)
Dept: LAB | Facility: HOSPITAL | Age: 70
End: 2020-02-27

## 2020-02-27 DIAGNOSIS — C91.10 CLL (CHRONIC LYMPHOCYTIC LEUKEMIA) (HCC): ICD-10-CM

## 2020-02-27 LAB
ALBUMIN SERPL-MCNC: 4.6 G/DL (ref 3.5–5.2)
ALBUMIN/GLOB SERPL: 1.9 G/DL
ALP SERPL-CCNC: 101 U/L (ref 39–117)
ALT SERPL W P-5'-P-CCNC: 13 U/L (ref 1–33)
ANION GAP SERPL CALCULATED.3IONS-SCNC: 10 MMOL/L (ref 5–15)
AST SERPL-CCNC: 19 U/L (ref 1–32)
B2 MICROGLOB SERPL-MCNC: 2.1 MG/L (ref 0.8–2.2)
BASOPHILS # BLD AUTO: 0.03 10*3/MM3 (ref 0–0.2)
BASOPHILS NFR BLD AUTO: 0.2 % (ref 0–1.5)
BILIRUB SERPL-MCNC: 0.8 MG/DL (ref 0.2–1.2)
BUN BLD-MCNC: 13 MG/DL (ref 8–23)
BUN/CREAT SERPL: 17.6 (ref 7–25)
CALCIUM SPEC-SCNC: 9.3 MG/DL (ref 8.6–10.5)
CHLORIDE SERPL-SCNC: 102 MMOL/L (ref 98–107)
CO2 SERPL-SCNC: 28 MMOL/L (ref 22–29)
CREAT BLD-MCNC: 0.74 MG/DL (ref 0.57–1)
DEPRECATED RDW RBC AUTO: 43.3 FL (ref 37–54)
EOSINOPHIL # BLD AUTO: 0.1 10*3/MM3 (ref 0–0.4)
EOSINOPHIL NFR BLD AUTO: 0.6 % (ref 0.3–6.2)
ERYTHROCYTE [DISTWIDTH] IN BLOOD BY AUTOMATED COUNT: 13 % (ref 12.3–15.4)
FERRITIN SERPL-MCNC: 131.5 NG/ML (ref 13–150)
GFR SERPL CREATININE-BSD FRML MDRD: 78 ML/MIN/1.73
GLOBULIN UR ELPH-MCNC: 2.4 GM/DL
GLUCOSE BLD-MCNC: 123 MG/DL (ref 65–99)
HCT VFR BLD AUTO: 39.4 % (ref 34–46.6)
HGB BLD-MCNC: 13 G/DL (ref 12–15.9)
IGG1 SER-MCNC: 718 MG/DL (ref 700–1600)
IRON 24H UR-MRATE: 86 MCG/DL (ref 37–145)
IRON SATN MFR SERPL: 27 % (ref 20–50)
LDH SERPL-CCNC: 213 U/L (ref 135–214)
LYMPHOCYTES # BLD AUTO: 11.29 10*3/MM3 (ref 0.7–3.1)
LYMPHOCYTES NFR BLD AUTO: 71.5 % (ref 19.6–45.3)
MCH RBC QN AUTO: 30 PG (ref 26.6–33)
MCHC RBC AUTO-ENTMCNC: 33 G/DL (ref 31.5–35.7)
MCV RBC AUTO: 90.8 FL (ref 79–97)
MONOCYTES # BLD AUTO: 0.37 10*3/MM3 (ref 0.1–0.9)
MONOCYTES NFR BLD AUTO: 2.3 % (ref 5–12)
NEUTROPHILS # BLD AUTO: 3.99 10*3/MM3 (ref 1.7–7)
NEUTROPHILS NFR BLD AUTO: 25.3 % (ref 42.7–76)
PLATELET # BLD AUTO: 236 10*3/MM3 (ref 140–450)
PMV BLD AUTO: 9.3 FL (ref 6–12)
POTASSIUM BLD-SCNC: 4.4 MMOL/L (ref 3.5–5.2)
PROT SERPL-MCNC: 7 G/DL (ref 6–8.5)
RBC # BLD AUTO: 4.34 10*6/MM3 (ref 3.77–5.28)
SMUDGE CELLS BLD QL SMEAR: NORMAL
SODIUM BLD-SCNC: 140 MMOL/L (ref 136–145)
TIBC SERPL-MCNC: 323 MCG/DL (ref 298–536)
TRANSFERRIN SERPL-MCNC: 217 MG/DL (ref 200–360)
WBC NRBC COR # BLD: 15.8 10*3/MM3 (ref 3.4–10.8)

## 2020-02-27 PROCEDURE — 85025 COMPLETE CBC W/AUTO DIFF WBC: CPT

## 2020-02-27 PROCEDURE — 36415 COLL VENOUS BLD VENIPUNCTURE: CPT

## 2020-02-27 PROCEDURE — 82232 ASSAY OF BETA-2 PROTEIN: CPT

## 2020-02-27 PROCEDURE — 83540 ASSAY OF IRON: CPT

## 2020-02-27 PROCEDURE — 84466 ASSAY OF TRANSFERRIN: CPT

## 2020-02-27 PROCEDURE — 83615 LACTATE (LD) (LDH) ENZYME: CPT

## 2020-02-27 PROCEDURE — 85007 BL SMEAR W/DIFF WBC COUNT: CPT

## 2020-02-27 PROCEDURE — 82728 ASSAY OF FERRITIN: CPT

## 2020-02-27 PROCEDURE — 80053 COMPREHEN METABOLIC PANEL: CPT

## 2020-02-27 PROCEDURE — 82784 ASSAY IGA/IGD/IGG/IGM EACH: CPT

## 2020-03-01 NOTE — PROGRESS NOTES
[facility] HEMATOLOGY AND ONCOLOGY  [address]  [facility] [address] [phone]    Patient Name: Liset Wright  Encounter Date: 03/05/2020  YOB: 1950  Patient Number: [id number]    REASON FOR VISIT:  Liset Wright is a 69-year-old female who returns in follow-up of newly diagnosed Stage 0 chronic lymphocytic leukemia (B-CLL). She is seen 13 months since her initial visit on 01/28/2019. She remains in observation. She is here alone.     DIAGNOSTIC ABNORMALITIES:   1. On 11/28/2018, labs showed a WBC of 13.9 with 78% lymphocytes (ALC 10.9), ANC 2.4, and was otherwise normal. Hemoglobin 12, hematocrit 36.7, MCV 87, platelets 261,000. CMP was normal in its entirety to include a BUN of 11, creatinine 0.78, GFR 79, calcium 9.2, total protein 6.8, and normal liver enzymes.  2. On 01/11/2019, CT of the abdomen and pelvis with IV contrast at [facility] showed no acute pathology in the abdomen or pelvis (no masses or any abnormalities to account for her left upper quadrant pain with no adenopathy and normal spleen).  3. On 01/16/2019, Ainsley-Barr virus titers showed an IgG level of 239 (0 - 17.9), Ainsley-Barr virus nuclear antigen IgG 118 (0 - 17.9), but IgM of less than 36 and early antigen of less than 9 (indicating prior infection).  4. On 01/10/2019, repeat CBC showed a hemoglobin of 11.3, hematocrit 35.5, MCV 91.3, platelets 249,000, WBC 13.29.   5. On 01/14/2019, blood smear (manual) review showed atypical lymphocytosis, moderate smudge cells present. RBC morphology showed normocytic, normochromic anemia without significant morphologic abnormality. Platelets normal morphology.  6. On 01/17/2019, peripheral blood was sent for flow cytometry ([facility]). This revealed chronic lymphocytic leukemia (54% of total cells). Monoclonal B cells have a CLL immunophenotype and are negative for both CD38 and ZAP-70  associated with standard risk disease. PCR for IGVH and p53 mutation and FISH for CLL may provide additional prognostic information. Virtually all of the B cells are monoclonal and express CD19 (dim), CD20 (dim), CD5, CD23, CD11c, and dim surface kappa light chain. They are negative for CD10, CD38, FMC-7, ZAP-70, and surface lambda light chain.   7. Labs, 01/28/2019: Hemoglobin 13.1, hematocrit 39.5, MCV 90.5, platelets 283,000, WBC 12.2 with 19.3 segs (ANC 2.4), 75.8 lymphocytes (ALC 9.2). CMP normal. GFR 85, calcium 9.9, total protein 7.1, and normal liver enzymes.  (265 - 665). Beta 2 microglobulin 1.3. Serum iron 101, TIBC 333, iron saturation 30%, B12 of 431, folate 15 (each within reference range). Ferritin 23.7 (depressed). HIV screen negative. IgG 742.  8. Bone marrow biopsy/aspirate, 02/01/2019: PERIPHERAL BLOOD: B cell chronic lymphocytic leukemia. BONE MARROW, UNSPECIFIED SITE, SMEARS, CLOT AND BIOPSY: Involved by B cell chronic lymphocytic leukemia (30%). CLL panel: Positive for a deletion of DLEUI, DILEU2 on chromosome 13 at 04 (58,0% of cells), consistent with CLL. Isolated del 1304.3 is associated with a favorable clinical course. Three of the twenty mitotic cells examined were characterized by loss of one copy of the X chromosome and a deletion in the long arm of chromosome 13.   9. Stools OB x 3, 02/25/2019. Negative x 3    PREVIOUS INTERVENTIONS:   1. Observation        Problem List Items Addressed This Visit        Hematopoietic and Hemostatic    CLL (chronic lymphocytic leukemia) (CMS/HCC) - Primary         No history exists.       PAST MEDICAL HISTORY:  ALLERGIES:  Allergies   Allergen Reactions   • Levofloxacin Paresthesia   • Phenergan [Promethazine Hcl] Other (See Comments)     Jerky movements   • Altace [Ramipril] Palpitations     CURRENT MEDICATIONS:  Outpatient Encounter Medications as of 3/5/2020   Medication Sig Dispense Refill   • dicyclomine (BENTYL) 10 MG capsule Take 10 mg by  "mouth 2 (Two) Times a Day.     • esomeprazole (nexIUM) 40 MG capsule Take 1 capsule by mouth 2 (Two) Times a Day. 60 capsule 11   • ferrous sulfate 325 (65 FE) MG tablet TAKE 1 TABLET BY MOUTH DAILY PER MELISSA     • metoprolol tartrate (LOPRESSOR) 100 MG tablet Take 100 mg by mouth 2 (Two) Times a Day.     • omeprazole (priLOSEC) 40 MG capsule Take 40 mg by mouth Daily.     • valsartan (DIOVAN) 320 MG tablet Take 320 mg by mouth Daily.       No facility-administered encounter medications on file as of 3/5/2020.      ADULT ILLNESSES:   Chronic lymphocytic leukemia ( ICD-10:C91.10 ;Chronic lymphocytic leukemia of B-cell type not having achieved remission   Abdominal pain ( ICD-10:R10.12 ;Left upper quadrant pain   Asthma ( Dr. Johnson; ICD-10:J45.909 ;Unspecified asthma, uncomplicated )   Cervical degenerative disk disease ( x-ray 11/2017; ICD-10:M50.20 ;Other cervical disc displacement, unspecified cervical region )   Erosive gastritis ( Dr. Kaur; ICD-10:K29.60 ;Other gastritis without bleeding )   Gastroesophageal reflux disease ( GERD, Dr. Kaur; ICD-10:K21.9 ;Gastro-esophageal reflux disease without esophagitis )   Gastroparesis ( ICD-10:K31.84 ;Gastroparesis   Hypertension ( ICD-10:I10 ;Essential (primary) hypertension   Irritable bowel syndrome ( ICD-10:K58.9 ;Irritable bowel syndrome without diarrhea   Migraines ( ICD-10:G43.909 ;Migraine, unspecified, not intractable, without status migrainosus   Normocytic anemia ( ICD-10:D64.9 ;Anemia, unspecified   Palpitations ( normal heart cath, 02/2003, bilateral ultrasound showed no significant stenosis, 06/2015, stress echo 05/2018 normal; ICD-10:R00.2 ;Palpitations )   Schatzki esophageal ring ( erosive gastritis/gastroesophageal reflux disease, Dr. Kaur; ICD-10:K22.2 ;Esophageal obstruction )    SURGERIES:   Cholecystectomy   Cardiac catheterization, 02/2003   Colonoscopy, 2017. \"No polyps.\" 5 years. Dr. Kaur   EGD (upper endoscopy), 2018, normal, Dr. Kaur "   Hysterectomy, total      ADULT ILLNESSES:  Patient Active Problem List   Diagnosis Code   • Palpitations R00.2   • PVCs (premature ventricular contractions) I49.3   • Essential hypertension I10   • Epigastric pain R10.13   • CLL (chronic lymphocytic leukemia) (CMS/HCC) C91.10   • Bronchitis J40   • Restrictive lung disease J98.4     SURGERIES:  Past Surgical History:   Procedure Laterality Date   • CARDIAC CATHETERIZATION     • CHOLECYSTECTOMY     • COLONOSCOPY  10/16/2015    normal   • ENDOSCOPY N/A 11/23/2016    normal   • ENDOSCOPY N/A 12/11/2018    Procedure: ESOPHAGOGASTRODUODENOSCOPY WITH ANESTHESIA;  Surgeon: Grant Kaur DO;  Location: [facility] ENDOSCOPY;  Service: Gastroenterology   • HYSTERECTOMY       HEALTH MAINTENANCE ITEMS:  Health Maintenance Due   Topic Date Due   • TDAP/TD VACCINES (1 - Tdap) 09/09/1963   • ZOSTER VACCINE (1 of 2) 09/09/2002   • HEPATITIS C SCREENING  04/26/2017   • LIPID PANEL  08/23/2019   • MAMMOGRAM  11/07/2019       <no information>  Last Completed Colonoscopy       Status Date      COLONOSCOPY Done 10/16/2015 SCANNED - COLONOSCOPY     Patient has more history with this topic...        Immunization History   Administered Date(s) Administered   • Flu Vaccine Quad PF >18YRS 10/01/2018   • Pneumococcal Polysaccharide (PPSV23) 10/01/2018     Last Completed Mammogram       Status Date      MAMMOGRAM Done 11/7/2017 Ext Proc: AL Scr mammo bi incl cad     Patient has more history with this topic...            FAMILY HISTORY:  Family History   Problem Relation Age of Onset   • Cancer Mother    • Cancer Father         lung   • Cancer Paternal Grandmother         stomach   • No Known Problems Brother    • Colon cancer Neg Hx    • Esophageal cancer Neg Hx      SOCIAL HISTORY:  Social History     Socioeconomic History   • Marital status:      Spouse name: Not on file   • Number of children: Not on file   • Years of education: Not on file   • Highest education level: Not on file  "  Tobacco Use   • Smoking status: Never Smoker   • Smokeless tobacco: Never Used   Substance and Sexual Activity   • Alcohol use: No   • Drug use: No   • Sexual activity: Defer       REVIEW OF SYSTEMS:  Constitutional:   The patient's appetite is good but energy is chronically low. \"I still do it all.\"  She manages her ADLs including chores, errands and working part time.  She still drives.  Says she has been working 40-70 hour weeks for the past several weeks, \"but it'll slow down soon.\"  She has lost 4 pounds (had gained 6 pounds at her 2 prior visits) since her last visit. \"That's why I've lost weight.\" She has no fevers, chills, or drenching night sweats. Her sleep habits seem appropriate.  Ear/Nose/Throat/Mouth:   She reports no ear pains, sinus symptoms, sore throat, nosebleeds, or sore tongue. She has migraine headaches. She denies any hoarseness, change in voice quality, or hemoptysis.   Ocular:   She reports no eye pain, significant change in visual acuity, double vision, or blurry vision.  Respiratory:   She reports that she was treated for \"walking pneumonia\"  Last 11/2019.  Says she is prone to respiratory infections due to alpha 1 antitrypsin deficiency.  She has some exertional dyspnea from possible asthma but no chronic cough, significant shortness of breathing at rest, phlegm production, or unexplained chest wall pain. Says prior PFTs suggest \"maybe asthma\"   Cardiovascular:   She reports no exertional chest pain, chest pressure, or chest heaviness. She reports no claudication. She reports no palpitations or symptomatic orthostasis.  Gastrointestinal:   She reports no dysphagia, nausea, vomiting, postprandial abdominal pain, bloating, cramping, change in bowel habits, with dark (OTC iron) discoloration of the stool. She reports no rectal bleeding. She reports occasional but chronic constipation requiring stool softeners, lifelong. She has no diarrhea.  Genitourinary:   She reports no urinary burning, " "frequency, dribbling, or discoloration. She reports no difficulty controlling her bladder. She has no need to urinate frequently through the night.   Musculoskeletal:   She reports no unexplained arthralgias, myalgias, or nighttime leg cramping.  Extremities:   She reports no trouble with fluid retention or significant leg swelling.  Endocrine:   She reports no problems with excess thirst, excessive urination, vasomotor instability, or unexplained fatigue.  Heme/Lymphatic:   She reports no unexplained bleeding, bruising, petechial rashes, or swollen glands.  Skin:   She reports no itching, rashes, or lesions which won't heal.  Neuro:   She reports no loss of consciousness, seizures, fainting spells, or dizziness. She reports no weakness of face, arms, or legs. She has no difficulty with speech. She has no tremors. She admits to occasional paresthesias of the hands.   Psych:   She seems generally satisfied with life. She denies depression. She reports no mood swings.         VITAL SIGNS: /87   Pulse 62   Temp 98.1 °F (36.7 °C)   Resp 18   Ht 163.8 cm (64.5\")   Wt 59.1 kg (130 lb 4.8 oz)   SpO2 98%   BMI 22.02 kg/m² Body surface area is 1.64 meters squared.  Pain Score    03/05/20 1124   PainSc: 0-No pain         PHYSICAL EXAMINATION:   General:   She is a slender but otherwise well-developed, well-nourished, and modestly-kept elderly female who is comfortable at rest. She arrived in the exam room ambulatory. She appears to be her stated age. Her skin color is normal. ECOG 0  Head/Neck:   The patient is anicteric and atraumatic. The mouth and throat are clear. The trachea is midline. The neck is supple without evidence of jugular venous distention or cervical adenopathy.   Eyes:   The pupils are equal, round, and reactive to light. The extraocular movements are full. There is no scleral jaundice or erythema.   Chest:   The respiratory efforts are normal and unhindered. The chest is clear to auscultation " and percussion. There are no wheezes, rhonchi, rales, or asymmetry of breath sounds.  Cardiovascular:   The patient has a regular cardiac rate and rhythm without murmurs, rubs, or gallops. The peripheral pulses are equal and full.  Abdomen:   The belly is soft and flat. There is no rebound or guarding. There is no organomegaly, mass-effect, or tenderness. Bowel sounds are active and of normal character.  Extremities:   There is no evidence of cyanosis, clubbing, or edema.  Rheumatologic:   There is no overt evidence of rheumatoid deformities of the hands. There is no sausaging of the fingers. There is no sign of active synovitis. The gait is normal.  Cutaneous:   There are no overt rashes, disseminated lesions, purpura, or petechiae.   Lymphatics:   There is no evidence of adenopathy in the cervical, supraclavicular, axillary, inguinal, or femoral areas. There is no splenomegaly.  Neurologic:   The patient is alert, oriented, cooperative, and pleasant. She is appropriately conversant. She ambulated into the exam room without assistance and transferred from chair to exam table unaided. There is no overt dysfunction of the motor, sensory, cerebellar systems.  Psych:   Mood and affect are appropriate for circumstance. Eye contact is appropriate. Normal judgement and decision making.         LABS    Lab Results - Last 18 Months   Lab Units 02/27/20  0833 11/25/19  1310 08/21/19  0821 05/31/19  0749 04/24/19  1328 02/21/19  0815 02/01/19  0850   HEMOGLOBIN g/dL 13.0 11.4* 12.5 12.5 11.9* 12.7 12.0   HEMATOCRIT % 39.4 34.5 37.2 37.6 36.1* 38.3 36.0*   MCV fL 90.8 92.0 90.7 91.3 90.5 91.0 89.6   WBC 10*3/mm3 15.80* 23.99* 13.94* 16.47* 16.22* 13.19* 14.50*   RDW % 13.0 13.0 12.7 13.1 13.2 13.0 13.1   MPV fL 9.3 9.2 9.7 9.4 9.4 10.0 10.5   PLATELETS 10*3/mm3 236 264 233 250 228 257 266   NEUTROS ABS 10*3/mm3 3.99 13.09* 3.62 2.14 4.38 1.45* 4.93   LYMPHS ABS 10*3/mm3 11.29*  --   --   --   --   --   --    MONOS ABS 10*3/mm3  0.37  --   --   --   --   --   --    EOS ABS 10*3/mm3 0.10  --   --  0.16  --   --  0.30   BASOS ABS 10*3/mm3 0.03  --   --   --  0.16  --  0.16   NRBC /100 WBC  --   --   --  0.0  --  1.0*  --    NEUTROPHIL % %  --  49.5 26.0* 13.0* 27.0* 11.0* 34.0*   MONOCYTES % %  --  5.1 6.0 1.0* 7.0 1.0* 3.2*   BASOPHIL % %  --   --   --   --  1.0  --  1.1   ATYP LYMPH % %  --  4.0 1.0 1.0 9.0*  --   --        Lab Results - Last 18 Months   Lab Units 20  0833 19  1310 19  1400   GLUCOSE mg/dL 123* 127* 95   SODIUM mmol/L 140 138 140   POTASSIUM mmol/L 4.4 3.5 5.0   CO2 mmol/L 28.0 29.0 30.0   CHLORIDE mmol/L 102 98 101   ANION GAP mmol/L 10.0 11.0 9.0   CREATININE mg/dL 0.74 0.69 0.69   BUN mg/dL 13 13 13   BUN / CREAT RATIO  17.6 18.8 18.8   CALCIUM mg/dL 9.3 9.1 9.9   EGFR IF NONAFRICN AM mL/min/1.73 78 85 85   ALK PHOS U/L 101 158* 78   TOTAL PROTEIN g/dL 7.0 7.3 7.1   ALT (SGPT) U/L 13 56* 27   AST (SGOT) U/L 19 74* 30   BILIRUBIN mg/dL 0.8 0.7 0.5   ALBUMIN g/dL 4.60 4.20 4.40   GLOBULIN gm/dL 2.4 3.1 2.7       Lab Results - Last 18 Months   Lab Units 20  0833 19  0821 19  0749 19  0815 19  1400 19  1302   LDH U/L 213 599 549 518 539  --    REFERENCE LAB REPORT   --   --   --   --   --  See attached result       Lab Results - Last 18 Months   Lab Units 20  0833 19  0821 19  0749 19  1400   IRON mcg/dL 86 101 99 101   TIBC mcg/dL 323 295 289 333   IRON SATURATION % 27 34 34 30   FERRITIN ng/mL 131.50 53.20 35.60 23.70   FOLATE ng/mL  --   --   --  15.00       ASSESSMENT:   1. B cell chronic lymphocytic leukemia (B-CLL):   Stage: 0   Tumor [place]: Lymphocytosis.   Complications of Tumor: None.   Tumor Status: Untreated.   IVGH and p53 mutations: Pending.   CD38: Negative.   ZAP-70: Negative.   Isolated del 13q14.3 (favorable)   LDH: 213, 2020 (prior: 518 - 599)   B2M: 2.1, 2020 (prior: 1.3 - 1.7)   Ig, 2020 (prior: 675 -  "502)  2. Normocytic anemia.  Repleted ferritin -131.5, 02/27/2020 (from 53.2; from 35; from 23). Improved, Hgb 13; MCV 90.8 on 02/27/2020 (prior: Hgb 11.3 - 13.3; MCV 87 - 91.3)   3. Irritable bowel syndrome.   4. Gastroparesis.   5. Migraines.   6. Cervical degenerative disk disease.        7.  Alpha 1 antitrypsin deficiency. Dr. Johnson    RECOMMENDATIONS:   1.  Re: Apprised of labs from 02/27/2020, Stable lymphocytosis, normal Hgb, normal platelets, repleted ferritin (> 100) but otherwise repleted iron levels, normal B2M, normal LDH, stable IgG (> 500), normal CMP.  2. Previously discussed the labs from 02/21 and 01/28/2019 stable low grade leukocytosis and lymphocytosis (greater than 5000) normal Hgb and normal platelets, normal CMP, normal LDH, normal B2M, repleted serum iron, repleted (> 20%) fe sat, low (< 100) ferritin, repleted B12/folate, negative HIV screen, IgG > 500.   3. Previously discussed the bone marrow biopsy, 02/01 (above). Confirms B-CLL with del 13q14 (favorable)   4.  Stop OTC iron po (ferritin > 100)  5. B- CLL again discussed. I had explained that standard therapy has not appreciably altered the nature history of CLL and survival curves demonstrate that CLL is an incurable disease. Randomized studies have failed to show a survival advantage of immediate treatment over delayed treatment for asymptomatic patients with early stage disease. As a result, treatment is generally reserved until the patient has active disease defined as the presence of disease-related symptoms: Weight loss greater than 10%; extreme fatigue; fever greater than 100.5 for 2 weeks with night sweats without evidence of infection (\"B\" symptoms); worsening cytopenias (HGB less than 11; platelets less than 100,000); unexplained recurrent infections; worsening adenopathy, or splenomegaly. She is aware to call should she develop any of these symptoms.   6. Continue currently identified medications.   7. Continue ongoing " management per primary care.   8. Advance Directive discussed.   9. Return to the [place] office in 24 weeks with pre-office serum iron, Fe sat, ferritin, CBC and differential, IgG, LDH, B2M.    QUALITY MEASURES:   MEDICAL DECISION MAKING: Moderate Complexity   AMOUNT OF DATA: Moderate   RISK OF COMPLICATIONS: Low     TIME SPENT: Face-to-face time on this encounter, as defined by the American Medical Association in the 2020 Current Procedural Terminology codebook; assessment, record review, lab review, planning and education - 25 minutes (greater than 50% face-to-face).     cc: Harry Hastings MD

## 2020-03-05 ENCOUNTER — OFFICE VISIT (OUTPATIENT)
Dept: ONCOLOGY | Facility: CLINIC | Age: 70
End: 2020-03-05

## 2020-03-05 VITALS
WEIGHT: 130.3 LBS | HEART RATE: 62 BPM | SYSTOLIC BLOOD PRESSURE: 131 MMHG | HEIGHT: 65 IN | DIASTOLIC BLOOD PRESSURE: 87 MMHG | BODY MASS INDEX: 21.71 KG/M2 | TEMPERATURE: 98.1 F | OXYGEN SATURATION: 98 % | RESPIRATION RATE: 18 BRPM

## 2020-03-05 DIAGNOSIS — C91.10 CLL (CHRONIC LYMPHOCYTIC LEUKEMIA) (HCC): Primary | ICD-10-CM

## 2020-03-05 PROBLEM — J40 BRONCHITIS: Status: ACTIVE | Noted: 2020-03-05

## 2020-03-05 PROBLEM — J98.4 RESTRICTIVE LUNG DISEASE: Status: ACTIVE | Noted: 2020-03-05

## 2020-03-05 PROCEDURE — 99214 OFFICE O/P EST MOD 30 MIN: CPT | Performed by: INTERNAL MEDICINE

## 2020-03-05 RX ORDER — OMEPRAZOLE 40 MG/1
40 CAPSULE, DELAYED RELEASE ORAL DAILY
COMMUNITY
Start: 2020-02-11

## 2020-03-05 RX ORDER — FERROUS SULFATE 325(65) MG
TABLET ORAL AS NEEDED
COMMUNITY
Start: 2020-01-28 | End: 2021-02-23 | Stop reason: ALTCHOICE

## 2020-05-04 ENCOUNTER — OFFICE VISIT (OUTPATIENT)
Age: 70
End: 2020-05-04
Payer: MEDICARE

## 2020-05-04 VITALS
HEART RATE: 56 BPM | SYSTOLIC BLOOD PRESSURE: 140 MMHG | WEIGHT: 125 LBS | DIASTOLIC BLOOD PRESSURE: 80 MMHG | BODY MASS INDEX: 20.83 KG/M2 | HEIGHT: 65 IN | OXYGEN SATURATION: 98 % | TEMPERATURE: 97.8 F

## 2020-05-04 PROCEDURE — 99213 OFFICE O/P EST LOW 20 MIN: CPT | Performed by: NURSE PRACTITIONER

## 2020-05-04 PROCEDURE — G8400 PT W/DXA NO RESULTS DOC: HCPCS | Performed by: NURSE PRACTITIONER

## 2020-05-04 PROCEDURE — G8427 DOCREV CUR MEDS BY ELIG CLIN: HCPCS | Performed by: NURSE PRACTITIONER

## 2020-05-04 PROCEDURE — 3017F COLORECTAL CA SCREEN DOC REV: CPT | Performed by: NURSE PRACTITIONER

## 2020-05-04 PROCEDURE — 1036F TOBACCO NON-USER: CPT | Performed by: NURSE PRACTITIONER

## 2020-05-04 PROCEDURE — 1123F ACP DISCUSS/DSCN MKR DOCD: CPT | Performed by: NURSE PRACTITIONER

## 2020-05-04 PROCEDURE — 1090F PRES/ABSN URINE INCON ASSESS: CPT | Performed by: NURSE PRACTITIONER

## 2020-05-04 PROCEDURE — 4040F PNEUMOC VAC/ADMIN/RCVD: CPT | Performed by: NURSE PRACTITIONER

## 2020-05-04 PROCEDURE — G8420 CALC BMI NORM PARAMETERS: HCPCS | Performed by: NURSE PRACTITIONER

## 2020-05-04 RX ORDER — METOPROLOL TARTRATE 100 MG/1
100 TABLET ORAL 2 TIMES DAILY
COMMUNITY

## 2020-05-04 RX ORDER — VALSARTAN 320 MG/1
320 TABLET ORAL DAILY
COMMUNITY

## 2020-05-04 RX ORDER — OMEPRAZOLE 40 MG/1
40 CAPSULE, DELAYED RELEASE ORAL DAILY
COMMUNITY
Start: 2020-02-11

## 2020-05-04 RX ORDER — DICYCLOMINE HYDROCHLORIDE 10 MG/1
10 CAPSULE ORAL 2 TIMES DAILY
COMMUNITY

## 2020-05-04 RX ORDER — FERROUS SULFATE 325(65) MG
TABLET ORAL
COMMUNITY
Start: 2020-01-28

## 2020-05-04 RX ORDER — ESOMEPRAZOLE MAGNESIUM 40 MG/1
40 CAPSULE, DELAYED RELEASE ORAL 2 TIMES DAILY
COMMUNITY
Start: 2018-01-24

## 2020-05-04 ASSESSMENT — ENCOUNTER SYMPTOMS
COUGH: 1
SHORTNESS OF BREATH: 1

## 2020-05-04 NOTE — PROGRESS NOTES
Promethazine Hcl Other (See Comments)     Jerky movements    Ramipril Palpitations       Health Maintenance   Topic Date Due    Potassium monitoring  1950    Creatinine monitoring  1950    Hepatitis C screen  1950    DTaP/Tdap/Td vaccine (1 - Tdap) 09/09/1971    Lipid screen  09/09/1992    Breast cancer screen  09/09/2002    Shingles Vaccine (1 of 2) 09/09/2002    Colon cancer screen colonoscopy  09/09/2002    DEXA (modify frequency per FRAX score)  09/09/2007    Pneumococcal 65+ years Vaccine (1 of 1 - PPSV23) 09/09/2017    Flu vaccine (Season Ended) 09/01/2020    Hepatitis A vaccine  Aged Out    Hepatitis B vaccine  Aged Out    Hib vaccine  Aged Out    Meningococcal (ACWY) vaccine  Aged Out       Subjective:      Review of Systems   Constitutional: Positive for fever. Respiratory: Positive for cough and shortness of breath. Musculoskeletal: Positive for myalgias. Objective:     Physical Exam  HENT:      Head: Normocephalic. Right Ear: Tympanic membrane normal.      Left Ear: Tympanic membrane normal.      Nose: Nose normal.      Mouth/Throat:      Mouth: Mucous membranes are moist.   Eyes:      Pupils: Pupils are equal, round, and reactive to light. Cardiovascular:      Rate and Rhythm: Normal rate and regular rhythm. Pulses: Normal pulses. Heart sounds: Normal heart sounds. Pulmonary:      Effort: Pulmonary effort is normal.      Breath sounds: Normal breath sounds. Neurological:      Mental Status: She is alert. BP (!) 140/80 (Site: Right Upper Arm, Position: Sitting, Cuff Size: Medium Adult)   Pulse 56   Temp 97.8 °F (36.6 °C) (Infrared)   Ht 5' 5\" (1.651 m)   Wt 125 lb (56.7 kg)   SpO2 98%   BMI 20.80 kg/m²   No results found for this visit on 05/04/20. Assessment/ Plan     ASSESSMENT:         Diagnosis Orders   1. Cough  COVID-19   2. Exposure to viral disease  COVID-19       PLAN:     1. COVID 19 test patient today.  Patient to self quarantine for 14 days. Increase fluids, rest, tylenol prn fever. Return if symptoms worsen or fail to improve. Patient given educational materials - see patient instructions. Discussed use, benefit, and side effects of prescribed medications. All patient questions answered. Pt voiced understanding. Patient agreed with treatment plan.  Follow up as needed      Electronically signed by ADOLFO Dash NP on 5/4/2020 at 3:21 PM

## 2020-05-07 LAB
REPORT: NORMAL
SARS-COV-2: NOT DETECTED
THIS TEST SENT TO: NORMAL

## 2020-07-06 ENCOUNTER — TRANSCRIBE ORDERS (OUTPATIENT)
Dept: ADMINISTRATIVE | Facility: HOSPITAL | Age: 70
End: 2020-07-06

## 2020-07-06 ENCOUNTER — HOSPITAL ENCOUNTER (OUTPATIENT)
Dept: CARDIOLOGY | Facility: HOSPITAL | Age: 70
Discharge: HOME | End: 2020-07-06
Admitting: INTERNAL MEDICINE

## 2020-07-06 DIAGNOSIS — R07.89 OTHER CHEST PAIN: Primary | ICD-10-CM

## 2020-07-06 PROCEDURE — 93005 ELECTROCARDIOGRAM TRACING: CPT

## 2020-07-06 PROCEDURE — 93010 ELECTROCARDIOGRAM REPORT: CPT | Performed by: INTERNAL MEDICINE

## 2020-07-14 ENCOUNTER — TRANSCRIBE ORDERS (OUTPATIENT)
Dept: ADMINISTRATIVE | Facility: HOSPITAL | Age: 70
End: 2020-07-14

## 2020-07-14 DIAGNOSIS — M54.2 CERVICALGIA: ICD-10-CM

## 2020-07-14 DIAGNOSIS — R07.89 OTHER CHEST PAIN: Primary | ICD-10-CM

## 2020-07-24 ENCOUNTER — HOSPITAL ENCOUNTER (OUTPATIENT)
Dept: CARDIOLOGY | Facility: HOSPITAL | Age: 70
Discharge: HOME | End: 2020-07-24
Admitting: INTERNAL MEDICINE

## 2020-07-24 VITALS
BODY MASS INDEX: 22.2 KG/M2 | HEIGHT: 64 IN | DIASTOLIC BLOOD PRESSURE: 70 MMHG | SYSTOLIC BLOOD PRESSURE: 184 MMHG | WEIGHT: 130 LBS

## 2020-07-24 DIAGNOSIS — R07.89 OTHER CHEST PAIN: ICD-10-CM

## 2020-07-24 DIAGNOSIS — M54.2 CERVICALGIA: ICD-10-CM

## 2020-07-24 LAB
BH CV ECHO MEAS - AO MAX PG (FULL): 4.1 MMHG
BH CV ECHO MEAS - AO MAX PG: 8.6 MMHG
BH CV ECHO MEAS - AO MEAN PG (FULL): 2 MMHG
BH CV ECHO MEAS - AO MEAN PG: 5 MMHG
BH CV ECHO MEAS - AO ROOT AREA (BSA CORRECTED): 1.4
BH CV ECHO MEAS - AO ROOT AREA: 4.2 CM^2
BH CV ECHO MEAS - AO ROOT DIAM: 2.3 CM
BH CV ECHO MEAS - AO V2 MAX: 147 CM/SEC
BH CV ECHO MEAS - AO V2 MEAN: 107 CM/SEC
BH CV ECHO MEAS - AO V2 VTI: 39.2 CM
BH CV ECHO MEAS - AVA(I,A): 1.6 CM^2
BH CV ECHO MEAS - AVA(I,D): 1.6 CM^2
BH CV ECHO MEAS - AVA(V,A): 1.7 CM^2
BH CV ECHO MEAS - AVA(V,D): 1.7 CM^2
BH CV ECHO MEAS - BSA(HAYCOCK): 1.6 M^2
BH CV ECHO MEAS - BSA: 1.6 M^2
BH CV ECHO MEAS - BZI_BMI: 22.3 KILOGRAMS/M^2
BH CV ECHO MEAS - BZI_METRIC_HEIGHT: 162.6 CM
BH CV ECHO MEAS - BZI_METRIC_WEIGHT: 59 KG
BH CV ECHO MEAS - EDV(CUBED): 81.2 ML
BH CV ECHO MEAS - EDV(MOD-SP4): 63 ML
BH CV ECHO MEAS - EDV(TEICH): 84.4 ML
BH CV ECHO MEAS - EF(CUBED): 76.6 %
BH CV ECHO MEAS - EF(MOD-SP4): 71.6 %
BH CV ECHO MEAS - EF(TEICH): 68.9 %
BH CV ECHO MEAS - ESV(CUBED): 19 ML
BH CV ECHO MEAS - ESV(MOD-SP4): 17.9 ML
BH CV ECHO MEAS - ESV(TEICH): 26.3 ML
BH CV ECHO MEAS - FS: 38.3 %
BH CV ECHO MEAS - IVS/LVPW: 0.92
BH CV ECHO MEAS - IVSD: 0.72 CM
BH CV ECHO MEAS - LA DIMENSION: 4 CM
BH CV ECHO MEAS - LA/AO: 1.7
BH CV ECHO MEAS - LAT PEAK E' VEL: 8.5 CM/SEC
BH CV ECHO MEAS - LV DIASTOLIC VOL/BSA (35-75): 38.7 ML/M^2
BH CV ECHO MEAS - LV MASS(C)D: 97.9 GRAMS
BH CV ECHO MEAS - LV MASS(C)DI: 60.1 GRAMS/M^2
BH CV ECHO MEAS - LV MAX PG: 4.6 MMHG
BH CV ECHO MEAS - LV MEAN PG: 3 MMHG
BH CV ECHO MEAS - LV SYSTOLIC VOL/BSA (12-30): 11 ML/M^2
BH CV ECHO MEAS - LV V1 MAX: 107 CM/SEC
BH CV ECHO MEAS - LV V1 MEAN: 74.3 CM/SEC
BH CV ECHO MEAS - LV V1 VTI: 27.7 CM
BH CV ECHO MEAS - LVIDD: 4.3 CM
BH CV ECHO MEAS - LVIDS: 2.7 CM
BH CV ECHO MEAS - LVLD AP4: 7.1 CM
BH CV ECHO MEAS - LVLS AP4: 5.6 CM
BH CV ECHO MEAS - LVOT AREA (M): 2.3 CM^2
BH CV ECHO MEAS - LVOT AREA: 2.3 CM^2
BH CV ECHO MEAS - LVOT DIAM: 1.7 CM
BH CV ECHO MEAS - LVPWD: 0.78 CM
BH CV ECHO MEAS - MED PEAK E' VEL: 6.31 CM/SEC
BH CV ECHO MEAS - MV A MAX VEL: 72.8 CM/SEC
BH CV ECHO MEAS - MV DEC SLOPE: 517 CM/SEC^2
BH CV ECHO MEAS - MV DEC TIME: 0.25 SEC
BH CV ECHO MEAS - MV E MAX VEL: 131 CM/SEC
BH CV ECHO MEAS - MV E/A: 1.8
BH CV ECHO MEAS - RAP SYSTOLE: 5 MMHG
BH CV ECHO MEAS - RVSP: 41 MMHG
BH CV ECHO MEAS - SI(AO): 100 ML/M^2
BH CV ECHO MEAS - SI(CUBED): 38.2 ML/M^2
BH CV ECHO MEAS - SI(LVOT): 38.6 ML/M^2
BH CV ECHO MEAS - SI(MOD-SP4): 27.7 ML/M^2
BH CV ECHO MEAS - SI(TEICH): 35.7 ML/M^2
BH CV ECHO MEAS - SV(AO): 162.9 ML
BH CV ECHO MEAS - SV(CUBED): 62.1 ML
BH CV ECHO MEAS - SV(LVOT): 62.9 ML
BH CV ECHO MEAS - SV(MOD-SP4): 45.1 ML
BH CV ECHO MEAS - SV(TEICH): 58.2 ML
BH CV ECHO MEAS - TR MAX VEL: 300 CM/SEC
BH CV ECHO MEASUREMENTS AVERAGE E/E' RATIO: 17.69
LEFT ATRIUM VOLUME INDEX: 39.3 ML/M2
LEFT ATRIUM VOLUME: 64 CM3
LV EF 2D ECHO EST: 70 %

## 2020-07-24 PROCEDURE — 93306 TTE W/DOPPLER COMPLETE: CPT | Performed by: INTERNAL MEDICINE

## 2020-07-24 PROCEDURE — 93306 TTE W/DOPPLER COMPLETE: CPT

## 2020-07-29 ENCOUNTER — TRANSCRIBE ORDERS (OUTPATIENT)
Dept: ADMINISTRATIVE | Facility: HOSPITAL | Age: 70
End: 2020-07-29

## 2020-07-29 DIAGNOSIS — R22.9 LOCALIZED SWELLING, MASS AND LUMP, UNSPECIFIED: ICD-10-CM

## 2020-07-29 DIAGNOSIS — R60.9 EDEMA, UNSPECIFIED TYPE: Primary | ICD-10-CM

## 2020-08-04 ENCOUNTER — HOSPITAL ENCOUNTER (OUTPATIENT)
Dept: CT IMAGING | Facility: HOSPITAL | Age: 70
Discharge: HOME | End: 2020-08-04
Admitting: INTERNAL MEDICINE

## 2020-08-04 DIAGNOSIS — R22.9 LOCALIZED SWELLING, MASS AND LUMP, UNSPECIFIED: ICD-10-CM

## 2020-08-04 DIAGNOSIS — R60.9 EDEMA, UNSPECIFIED TYPE: ICD-10-CM

## 2020-08-04 LAB — CREAT BLDA-MCNC: 1 MG/DL (ref 0.6–1.3)

## 2020-08-04 PROCEDURE — 25010000002 IOPAMIDOL 61 % SOLUTION: Performed by: INTERNAL MEDICINE

## 2020-08-04 PROCEDURE — 82565 ASSAY OF CREATININE: CPT

## 2020-08-04 PROCEDURE — 70491 CT SOFT TISSUE NECK W/DYE: CPT

## 2020-08-04 RX ADMIN — IOPAMIDOL 100 ML: 612 INJECTION, SOLUTION INTRAVENOUS at 07:34

## 2020-08-07 ENCOUNTER — TELEPHONE (OUTPATIENT)
Dept: ONCOLOGY | Facility: CLINIC | Age: 70
End: 2020-08-07

## 2020-08-11 NOTE — PROGRESS NOTES
" YENNY Castro  [facility]   Res[facility]  [address] [place] [address]  Phone: [phone]  Fax: [phone]     Liset Wright is a 69 y.o. female.   CC:   Chief Complaint   Patient presents with   • \"Here for my annual follow up\".        HPI:  Ms. Wright is a pleasant 69 year old female patient of Dr. Johnson with known Asthma, scarring of lung, CLL, Bronchitis, and GERD. She was last seen in July of 2019 with PFTs showing moderate obstruction for which Dr. Johnson felt was related to her Asthma and as a non smoker did not have COPD. Her dypsnea was mild. She states about a month ago was diagnosed with Grade II diastolic dysfunction. She is able to do daily activities but is slow.     Patient denies fever, chills, cough, shortness of breath or difficulty breathing, fatigue, muscle or body aches, new onset headache, new loss of taste or smell, sore throat, congestion or runny nose, nausea or vomiting, diarrhea.  Patient denies any known exposure to a person under investigation or positive for COVID-19.  Patient is wearing mask today.      The following portions of the patient's history were reviewed and updated as appropriate: allergies, current medications, past family history, past medical history, past social history, past surgical history and problem list.    Past Medical History:   Diagnosis Date   • Acid reflux    • Arrhythmia    • Arthritis    • Asthma    • Cancer (CMS/HCC)     leukemia   • Hypertension    • Palpitations        Family History   Problem Relation Age of Onset   • Cancer Mother    • Cancer Father         lung   • Cancer Paternal Grandmother         stomach   • No Known Problems Brother    • No Known Problems Maternal Aunt    • No Known Problems Maternal Uncle    • No Known Problems Paternal Aunt    • No Known Problems Paternal Uncle    • No Known Problems Maternal Grandmother    • No Known Problems Maternal Grandfather    • No Known Problems " "Paternal Grandfather    • No Known Problems Other    • Colon cancer Neg Hx    • Esophageal cancer Neg Hx    • Asthma Neg Hx    • Diabetes Neg Hx    • Emphysema Neg Hx    • Heart failure Neg Hx    • Hypertension Neg Hx        Social History     Socioeconomic History   • Marital status:      Spouse name: Not on file   • Number of children: Not on file   • Years of education: Not on file   • Highest education level: Not on file   Tobacco Use   • Smoking status: Never Smoker   • Smokeless tobacco: Never Used   Substance and Sexual Activity   • Alcohol use: No   • Drug use: No   • Sexual activity: Defer       Review of Systems   Constitutional: Positive for fatigue. Negative for chills, diaphoresis and fever.   HENT: Negative for congestion, rhinorrhea and trouble swallowing.    Eyes: Negative for blurred vision, double vision and visual disturbance.   Respiratory: Positive for shortness of breath. Negative for cough and wheezing.    Cardiovascular: Positive for leg swelling (ankles). Negative for chest pain and palpitations.   Gastrointestinal: Negative for abdominal distention, abdominal pain and nausea.   Endocrine: Negative for cold intolerance, heat intolerance and polydipsia.   Musculoskeletal: Negative for back pain, gait problem and myalgias.   Skin: Negative for color change, pallor and rash.   Neurological: Negative for dizziness, syncope and confusion.   Hematological: Negative for adenopathy. Does not bruise/bleed easily.   Psychiatric/Behavioral: Negative for agitation, behavioral problems and sleep disturbance.       /80   Pulse 60   Temp 96.8 °F (36 °C)   Resp 16   Ht 162.6 cm (64\")   Wt 62.1 kg (137 lb)   SpO2 98% Comment: RA  Breastfeeding No   BMI 23.52 kg/m²     Physical Exam   Constitutional: She is oriented to person, place, and time. She appears well-developed and well-nourished. No distress.   HENT:   Head: Normocephalic and atraumatic.   Eyes: Pupils are equal, round, and " reactive to light. Conjunctivae are normal.   Neck: Normal range of motion. Neck supple.   Cardiovascular: Normal rate, regular rhythm and normal heart sounds. Exam reveals no gallop and no friction rub.   No murmur heard.  Pulmonary/Chest: Effort normal and breath sounds normal. No respiratory distress. She has no wheezes.   Abdominal: Soft. Bowel sounds are normal.   Musculoskeletal: Normal range of motion. She exhibits no edema or deformity.   Lymphadenopathy:     She has no cervical adenopathy.   Neurological: She is alert and oriented to person, place, and time.   Skin: Skin is warm and dry. She is not diaphoretic. No erythema.   Psychiatric: She has a normal mood and affect. Her behavior is normal. Judgment and thought content normal.       Pulmonary Functions Testing Results:    PFT Values        Some values may be hidden. Unless noted otherwise, only the newest values recorded on each date are displayed.         Old Values PFT Results 7/17/19   FVC 75%   FEV1 60%   FEV1/FVC 63.56%      Pre Drug PFT Results 7/17/19   No data to display.      Post Drug PFT Results 7/17/19   No data to display.      Other Tests PFT Results 7/17/19   No data to display.           Results for orders placed in visit on 07/17/19   Pulmonary Function Test       My interpretation: no new    CXR: none        Problem List Items Addressed This Visit        Respiratory    Bronchitis      Other Visit Diagnoses     Mild intermittent asthma without complication    -  Primary    Scarring of lung        Grade II diastolic dysfunction            Patient's Body mass index is 23.52 kg/m². BMI is within normal parameters. No follow-up required..      Assessment/Plan        We reviewed her Echo results and discussed her grade II diastolic dysfunction. We discussed low sodium diet and foods to avoid. She is overall feeling stable with intermittent days of SOB and fatigue. She is able to perform daily activities. She is asked to return in one year  and we will address PFTs at that time. Given her stability would not send her for PFTs given the current pandemic.     Lisa Lulú Christiano, APRN  8/12/2020  11:44    Return in about 1 year (around 8/12/2021).    This dictation was generated by voice recognition computer software. Although all attempts are made to edit dictation for accuracy, there may be errors in the transcription that are not intended.

## 2020-08-12 ENCOUNTER — LAB (OUTPATIENT)
Dept: LAB | Facility: HOSPITAL | Age: 70
End: 2020-08-12

## 2020-08-12 ENCOUNTER — OFFICE VISIT (OUTPATIENT)
Dept: PULMONOLOGY | Facility: CLINIC | Age: 70
End: 2020-08-12

## 2020-08-12 VITALS
BODY MASS INDEX: 23.39 KG/M2 | DIASTOLIC BLOOD PRESSURE: 80 MMHG | OXYGEN SATURATION: 98 % | HEART RATE: 60 BPM | WEIGHT: 137 LBS | RESPIRATION RATE: 16 BRPM | SYSTOLIC BLOOD PRESSURE: 138 MMHG | TEMPERATURE: 96.8 F | HEIGHT: 64 IN

## 2020-08-12 DIAGNOSIS — I51.89 GRADE II DIASTOLIC DYSFUNCTION: ICD-10-CM

## 2020-08-12 DIAGNOSIS — C91.10 CLL (CHRONIC LYMPHOCYTIC LEUKEMIA) (HCC): ICD-10-CM

## 2020-08-12 DIAGNOSIS — J45.20 MILD INTERMITTENT ASTHMA WITHOUT COMPLICATION: Primary | ICD-10-CM

## 2020-08-12 DIAGNOSIS — J40 BRONCHITIS: ICD-10-CM

## 2020-08-12 DIAGNOSIS — J98.4 SCARRING OF LUNG: ICD-10-CM

## 2020-08-12 LAB
ALBUMIN SERPL-MCNC: 4.5 G/DL (ref 3.5–5.2)
ALBUMIN/GLOB SERPL: 2.3 G/DL
ALP SERPL-CCNC: 85 U/L (ref 39–117)
ALT SERPL W P-5'-P-CCNC: 19 U/L (ref 1–33)
ANION GAP SERPL CALCULATED.3IONS-SCNC: 8 MMOL/L (ref 5–15)
AST SERPL-CCNC: 22 U/L (ref 1–32)
B2 MICROGLOB SERPL-MCNC: 1.8 MG/L (ref 0.8–2.2)
BILIRUB SERPL-MCNC: 0.7 MG/DL (ref 0–1.2)
BUN SERPL-MCNC: 11 MG/DL (ref 8–23)
BUN/CREAT SERPL: 15.9 (ref 7–25)
CALCIUM SPEC-SCNC: 9.3 MG/DL (ref 8.6–10.5)
CHLORIDE SERPL-SCNC: 100 MMOL/L (ref 98–107)
CO2 SERPL-SCNC: 30 MMOL/L (ref 22–29)
CREAT SERPL-MCNC: 0.69 MG/DL (ref 0.57–1)
DEPRECATED RDW RBC AUTO: 42.2 FL (ref 37–54)
EOSINOPHIL # BLD MANUAL: 0.45 10*3/MM3 (ref 0–0.4)
EOSINOPHIL NFR BLD MANUAL: 3.1 % (ref 0.3–6.2)
ERYTHROCYTE [DISTWIDTH] IN BLOOD BY AUTOMATED COUNT: 12.5 % (ref 12.3–15.4)
FERRITIN SERPL-MCNC: 145.4 NG/ML (ref 13–150)
GFR SERPL CREATININE-BSD FRML MDRD: 85 ML/MIN/1.73
GLOBULIN UR ELPH-MCNC: 2 GM/DL
GLUCOSE SERPL-MCNC: 112 MG/DL (ref 65–99)
HCT VFR BLD AUTO: 37.7 % (ref 34–46.6)
HGB BLD-MCNC: 12.4 G/DL (ref 12–15.9)
IGG1 SER-MCNC: 666 MG/DL (ref 700–1600)
LDH SERPL-CCNC: 215 U/L (ref 135–214)
LYMPHOCYTES # BLD MANUAL: 9.32 10*3/MM3 (ref 0.7–3.1)
LYMPHOCYTES NFR BLD MANUAL: 4.1 % (ref 5–12)
LYMPHOCYTES NFR BLD MANUAL: 63.9 % (ref 19.6–45.3)
MCH RBC QN AUTO: 30.3 PG (ref 26.6–33)
MCHC RBC AUTO-ENTMCNC: 32.9 G/DL (ref 31.5–35.7)
MCV RBC AUTO: 92.2 FL (ref 79–97)
MONOCYTES # BLD AUTO: 0.6 10*3/MM3 (ref 0.1–0.9)
NEUTROPHILS # BLD AUTO: 3.6 10*3/MM3 (ref 1.7–7)
NEUTROPHILS NFR BLD MANUAL: 24.7 % (ref 42.7–76)
PLAT MORPH BLD: NORMAL
PLATELET # BLD AUTO: 227 10*3/MM3 (ref 140–450)
PMV BLD AUTO: 9.5 FL (ref 6–12)
POTASSIUM SERPL-SCNC: 4.5 MMOL/L (ref 3.5–5.2)
PROT SERPL-MCNC: 6.5 G/DL (ref 6–8.5)
RBC # BLD AUTO: 4.09 10*6/MM3 (ref 3.77–5.28)
RBC MORPH BLD: NORMAL
SMUDGE CELLS BLD QL SMEAR: ABNORMAL
SODIUM SERPL-SCNC: 138 MMOL/L (ref 136–145)
VARIANT LYMPHS NFR BLD MANUAL: 4.1 % (ref 0–5)
WBC # BLD AUTO: 14.59 10*3/MM3 (ref 3.4–10.8)

## 2020-08-12 PROCEDURE — 82232 ASSAY OF BETA-2 PROTEIN: CPT

## 2020-08-12 PROCEDURE — 82784 ASSAY IGA/IGD/IGG/IGM EACH: CPT

## 2020-08-12 PROCEDURE — 82728 ASSAY OF FERRITIN: CPT

## 2020-08-12 PROCEDURE — 83615 LACTATE (LD) (LDH) ENZYME: CPT

## 2020-08-12 PROCEDURE — 85025 COMPLETE CBC W/AUTO DIFF WBC: CPT

## 2020-08-12 PROCEDURE — 99213 OFFICE O/P EST LOW 20 MIN: CPT | Performed by: NURSE PRACTITIONER

## 2020-08-12 PROCEDURE — 85007 BL SMEAR W/DIFF WBC COUNT: CPT

## 2020-08-12 PROCEDURE — 80053 COMPREHEN METABOLIC PANEL: CPT

## 2020-08-12 PROCEDURE — 36415 COLL VENOUS BLD VENIPUNCTURE: CPT

## 2020-08-15 NOTE — PROGRESS NOTES
MGW ONC [facility] GROUP HEMATOLOGY AND ONCOLOGY  [address]  [facility] [address] [phone]    Patient Name: Liset Wright  Encounter Date: 08/18/2020  YOB: 1950  Patient Number: [id number]       REASON FOR VISIT:  Liset Wright is a 69-year-old female who returns in follow-up of stage 0 chronic lymphocytic leukemia (B-CLL). She is seen 18.5 months since her initial visit on 01/28/2019. She remains in observation. She is here alone.     DIAGNOSTIC ABNORMALITIES:   1. On 11/28/2018, labs showed a WBC of 13.9 with 78% lymphocytes (ALC 10.9), ANC 2.4, and was otherwise normal. Hemoglobin 12, hematocrit 36.7, MCV 87, platelets 261,000. CMP was normal in its entirety to include a BUN of 11, creatinine 0.78, GFR 79, calcium 9.2, total protein 6.8, and normal liver enzymes.  2. On 01/11/2019, CT of the abdomen and pelvis with IV contrast at [facility] showed no acute pathology in the abdomen or pelvis (no masses or any abnormalities to account for her left upper quadrant pain with no adenopathy and normal spleen).  3. On 01/16/2019, Ainsley-Barr virus titers showed an IgG level of 239 (0 - 17.9), Ainsley-Barr virus nuclear antigen IgG 118 (0 - 17.9), but IgM of less than 36 and early antigen of less than 9 (indicating prior infection).  4. On 01/10/2019, repeat CBC showed a hemoglobin of 11.3, hematocrit 35.5, MCV 91.3, platelets 249,000, WBC 13.29.   5. On 01/14/2019, blood smear (manual) review showed atypical lymphocytosis, moderate smudge cells present. RBC morphology showed normocytic, normochromic anemia without significant morphologic abnormality. Platelets normal morphology.  6. On 01/17/2019, peripheral blood was sent for flow cytometry ([employer]). This revealed chronic lymphocytic leukemia (54% of total cells). Monoclonal B cells have a CLL immunophenotype and are negative for both CD38 and ZAP-70 associated  with standard risk disease. PCR for IGVH and p53 mutation and FISH for CLL may provide additional prognostic information. Virtually all of the B cells are monoclonal and express CD19 (dim), CD20 (dim), CD5, CD23, CD11c, and dim surface kappa light chain. They are negative for CD10, CD38, FMC-7, ZAP-70, and surface lambda light chain.   7. Labs, 01/28/2019: Hemoglobin 13.1, hematocrit 39.5, MCV 90.5, platelets 283,000, WBC 12.2 with 19.3 segs (ANC 2.4), 75.8 lymphocytes (ALC 9.2). CMP normal. GFR 85, calcium 9.9, total protein 7.1, and normal liver enzymes.  (265 - 665). Beta 2 microglobulin 1.3. Serum iron 101, TIBC 333, iron saturation 30%, B12 of 431, folate 15 (each within reference range). Ferritin 23.7 (depressed). HIV screen negative. IgG 742.  8. Bone marrow biopsy/aspirate, 02/01/2019: PERIPHERAL BLOOD: B cell chronic lymphocytic leukemia. BONE MARROW, UNSPECIFIED SITE, SMEARS, CLOT AND BIOPSY: Involved by B cell chronic lymphocytic leukemia (30%). CLL panel: Positive for a deletion of DLEUI, DILEU2 on chromosome 13 at 04 (58,0% of cells), consistent with CLL. Isolated del 1304.3 is associated with a favorable clinical course. Three of the twenty mitotic cells examined were characterized by loss of one copy of the X chromosome and a deletion in the long arm of chromosome 13.   9. Stools OB x 3, 02/25/2019. Negative x 3    PREVIOUS INTERVENTIONS:   1. Observation        Problem List Items Addressed This Visit        Hematopoietic and Hemostatic    CLL (chronic lymphocytic leukemia) (CMS/HCC) - Primary         No history exists.       PAST MEDICAL HISTORY:  ALLERGIES:  Allergies   Allergen Reactions   • Levofloxacin Paresthesia   • Phenergan [Promethazine Hcl] Other (See Comments)     Jerky movements   • Altace [Ramipril] Palpitations     CURRENT MEDICATIONS:  Outpatient Encounter Medications as of 8/18/2020   Medication Sig Dispense Refill   • dicyclomine (BENTYL) 10 MG capsule Take 10 mg by mouth 2  "(Two) Times a Day.     • esomeprazole (nexIUM) 40 MG capsule Take 1 capsule by mouth 2 (Two) Times a Day. 60 capsule 11   • ferrous sulfate 325 (65 FE) MG tablet TAKE 1 TABLET BY MOUTH DAILY PER MELISSA     • metoprolol tartrate (LOPRESSOR) 100 MG tablet Take 100 mg by mouth 2 (Two) Times a Day.     • omeprazole (priLOSEC) 40 MG capsule Take 40 mg by mouth Daily.     • valsartan (DIOVAN) 320 MG tablet Take 320 mg by mouth Daily.       No facility-administered encounter medications on file as of 8/18/2020.      ADULT ILLNESSES:   Chronic lymphocytic leukemia ( ICD-10:C91.10 ;Chronic lymphocytic leukemia of B-cell type not having achieved remission   Abdominal pain ( ICD-10:R10.12 ;Left upper quadrant pain   Asthma ( Dr. Johnson; ICD-10:J45.909 ;Unspecified asthma, uncomplicated )   Cervical degenerative disk disease ( x-ray 11/2017; ICD-10:M50.20 ;Other cervical disc displacement, unspecified cervical region )   Erosive gastritis ( Dr. Kaur; ICD-10:K29.60 ;Other gastritis without bleeding )   Gastroesophageal reflux disease ( GERD, Dr. Kaur; ICD-10:K21.9 ;Gastro-esophageal reflux disease without esophagitis )   Gastroparesis ( ICD-10:K31.84 ;Gastroparesis   Hypertension ( ICD-10:I10 ;Essential (primary) hypertension   Irritable bowel syndrome ( ICD-10:K58.9 ;Irritable bowel syndrome without diarrhea   Migraines ( ICD-10:G43.909 ;Migraine, unspecified, not intractable, without status migrainosus   Normocytic anemia ( ICD-10:D64.9 ;Anemia, unspecified   Palpitations ( normal heart cath, 02/2003, bilateral ultrasound showed no significant stenosis, 06/2015, stress echo 05/2018 normal; ICD-10:R00.2 ;Palpitations )   Schatzki esophageal ring ( erosive gastritis/gastroesophageal reflux disease, Dr. Kaur; ICD-10:K22.2 ;Esophageal obstruction )    SURGERIES:   Cholecystectomy   Cardiac catheterization, 02/2003   Colonoscopy, 2017. \"No polyps.\" 5 years. Dr. Kaur   EGD (upper endoscopy), 2018, normal, Dr. Kaur "   Hysterectomy, total      ADULT ILLNESSES:  Patient Active Problem List   Diagnosis Code   • Palpitations R00.2   • PVCs (premature ventricular contractions) I49.3   • Essential hypertension I10   • Epigastric pain R10.13   • CLL (chronic lymphocytic leukemia) (CMS/HCC) C91.10   • Bronchitis J40   • Restrictive lung disease J98.4     SURGERIES:  Past Surgical History:   Procedure Laterality Date   • CARDIAC CATHETERIZATION     • CHOLECYSTECTOMY     • COLONOSCOPY  10/16/2015    normal   • ENDOSCOPY N/A 11/23/2016    normal   • ENDOSCOPY N/A 12/11/2018    Procedure: ESOPHAGOGASTRODUODENOSCOPY WITH ANESTHESIA;  Surgeon: Grant Kaur DO;  Location: [facility] ENDOSCOPY;  Service: Gastroenterology   • HYSTERECTOMY       HEALTH MAINTENANCE ITEMS:  Health Maintenance Due   Topic Date Due   • TDAP/TD VACCINES (1 - Tdap) 09/09/1963   • ZOSTER VACCINE (1 of 2) 09/09/2002   • HEPATITIS C SCREENING  04/26/2017   • LIPID PANEL  08/23/2019   • MAMMOGRAM  11/07/2019   • INFLUENZA VACCINE  08/01/2020       <no information>  Last Completed Colonoscopy       Status Date      COLONOSCOPY Done 10/16/2015 SCANNED - COLONOSCOPY     Patient has more history with this topic...        Immunization History   Administered Date(s) Administered   • FLUAD TRI 65YR+ 10/06/2017, 10/09/2018   • FLUARIX/FLUZONE/AFLURIA/FLULAVAL QUAD 11/01/2019   • Flu Vaccine Quad PF >18YRS 10/01/2018   • Hep B, Unspecified 01/10/2002, 02/15/2002, 05/16/2002   • Influenza Quad Vaccine (Inpatient) 10/01/2018   • Pneumococcal Conjugate 13-Valent (PCV13) 10/06/2017   • Pneumococcal Polysaccharide (PPSV23) 10/01/2018     Last Completed Mammogram       Status Date      MAMMOGRAM Done 11/7/2017 Ext Proc: IL Scr mammo bi incl cad     Patient has more history with this topic...            FAMILY HISTORY:  Family History   Problem Relation Age of Onset   • Cancer Mother    • Cancer Father         lung   • Cancer Paternal Grandmother         stomach   • No Known Problems  "Brother    • No Known Problems Maternal Aunt    • No Known Problems Maternal Uncle    • No Known Problems Paternal Aunt    • No Known Problems Paternal Uncle    • No Known Problems Maternal Grandmother    • No Known Problems Maternal Grandfather    • No Known Problems Paternal Grandfather    • No Known Problems Other    • Colon cancer Neg Hx    • Esophageal cancer Neg Hx    • Asthma Neg Hx    • Diabetes Neg Hx    • Emphysema Neg Hx    • Heart failure Neg Hx    • Hypertension Neg Hx      SOCIAL HISTORY:  Social History     Socioeconomic History   • Marital status:      Spouse name: Not on file   • Number of children: Not on file   • Years of education: Not on file   • Highest education level: Not on file   Tobacco Use   • Smoking status: Never Smoker   • Smokeless tobacco: Never Used   Substance and Sexual Activity   • Alcohol use: No   • Drug use: No   • Sexual activity: Defer       REVIEW OF SYSTEMS:  Constitutional:   The patient's appetite is good but energy is chronically low. \"I still tire.\"  She manages her ADLs including chores, errands but is not working at this time.  She still drives.  She has regained 5 pounds (had lost 4 pounds at her prior visit) since her last visit. She has no fevers, chills, or drenching night sweats. Her sleep habits seem appropriate.  Ear/Nose/Throat/Mouth:   She reports no ear pains, sinus symptoms, sore throat, nosebleeds, or sore tongue. She has migraine headaches. She denies any hoarseness, change in voice quality, or hemoptysis.   Ocular:   She reports no eye pain, significant change in visual acuity, double vision, or blurry vision.  Respiratory:   Says she is prone to respiratory infections due to alpha 1 antitrypsin deficiency.  She has some exertional dyspnea from possible asthma but no chronic cough, significant shortness of breathing at rest, phlegm production, or unexplained chest wall pain. Says prior PFTs suggest \"maybe asthma\"   Cardiovascular:   She reports " "no exertional chest pain, chest pressure, or chest heaviness. She reports no claudication. She reports occasional palpitations but denies symptomatic orthostasis.  Gastrointestinal:   She reports no dysphagia, nausea, vomiting, postprandial abdominal pain, bloating, cramping, change in bowel habits, with dark (OTC iron) discoloration of the stool. She reports no rectal bleeding. She reports occasional but chronic constipation requiring stool softeners, lifelong. She has no diarrhea.  Genitourinary:   She reports no urinary burning, frequency, dribbling, or discoloration. She reports no difficulty controlling her bladder. She has no need to urinate frequently through the night.   Musculoskeletal:   She reports no unexplained arthralgias, myalgias, or nighttime leg cramping.  Extremities:   She reports no trouble with fluid retention or significant leg swelling.  Endocrine:   She reports no problems with excess thirst, excessive urination, vasomotor instability, or unexplained fatigue.  Heme/Lymphatic:   She reports no unexplained bleeding, bruising, petechial rashes, or swollen glands.  Skin:   She reports no itching, rashes, or lesions which won't heal.  Neuro:   She reports no loss of consciousness, seizures, fainting spells, or dizziness. She reports no weakness of face, arms, or legs. She has no difficulty with speech. She has no tremors. She admits to occasional paresthesias of the hands.   Psych:   She seems generally satisfied with life. She denies depression. She reports no mood swings.         VITAL SIGNS: /88   Pulse 53   Temp 97.7 °F (36.5 °C)   Resp 16   Ht 162.6 cm (64\")   Wt 61.6 kg (135 lb 14.4 oz)   SpO2 98%   Breastfeeding No   BMI 23.33 kg/m² Body surface area is 1.66 meters squared.  Pain Score    08/18/20 0843   PainSc: 0-No pain         PHYSICAL EXAMINATION:   General:   She is a slender but otherwise well-developed, well-nourished, and modestly-kept elderly female who is " comfortable at rest. She arrived in the exam room ambulatory. She appears to be her stated age. Her skin color is normal. ECOG 0  Head/Neck:   The patient is anicteric and atraumatic. The mouth and throat are clear. The trachea is midline. The neck is supple without evidence of jugular venous distention or cervical adenopathy. Prominent, non-tender right sternoclavicular joint.  Eyes:   The pupils are equal, round, and reactive to light. The extraocular movements are full. There is no scleral jaundice or erythema.   Chest:   The respiratory efforts are normal and unhindered. The chest is clear to auscultation and percussion. There are no wheezes, rhonchi, rales, or asymmetry of breath sounds.  Cardiovascular:   The patient has a regular cardiac rate and rhythm without murmurs, rubs, or gallops. The peripheral pulses are equal and full.  Abdomen:   The belly is soft and flat. There is no rebound or guarding. There is no organomegaly, mass-effect, or tenderness. Bowel sounds are active and of normal character.  Extremities:   There is no evidence of cyanosis, clubbing, or edema.  Rheumatologic:   There is no overt evidence of rheumatoid deformities of the hands. There is no sausaging of the fingers. There is no sign of active synovitis. The gait is normal.  Cutaneous:   There are no overt rashes, disseminated lesions, purpura, or petechiae.   Lymphatics:   There is no evidence of adenopathy in the cervical, supraclavicular, axillary, inguinal, or femoral areas. There is no splenomegaly.  Neurologic:   The patient is alert, oriented, cooperative, and pleasant. She is appropriately conversant. She ambulated into the exam room without assistance and transferred from chair to exam table unaided. There is no overt dysfunction of the motor, sensory, cerebellar systems.  Psych:   Mood and affect are appropriate for circumstance. Eye contact is appropriate. Normal judgement and decision making.         LABS    Lab Results -  Last 18 Months   Lab Units 08/12/20 0922 02/27/20  0833 11/25/19  1310 08/21/19  0821 05/31/19  0749 04/24/19  1328 02/21/19  0815   HEMOGLOBIN g/dL 12.4 13.0 11.4* 12.5 12.5 11.9* 12.7   HEMATOCRIT % 37.7 39.4 34.5 37.2 37.6 36.1* 38.3   MCV fL 92.2 90.8 92.0 90.7 91.3 90.5 91.0   WBC 10*3/mm3 14.59* 15.80* 23.99* 13.94* 16.47* 16.22* 13.19*   RDW % 12.5 13.0 13.0 12.7 13.1 13.2 13.0   MPV fL 9.5 9.3 9.2 9.7 9.4 9.4 10.0   PLATELETS 10*3/mm3 227 236 264 233 250 228 257   NEUTROS ABS 10*3/mm3 3.60 3.99 13.09* 3.62 2.14 4.38 1.45*   LYMPHS ABS 10*3/mm3  --  11.29*  --   --   --   --   --    MONOS ABS 10*3/mm3  --  0.37  --   --   --   --   --    EOS ABS 10*3/mm3 0.45* 0.10  --   --  0.16  --   --    BASOS ABS 10*3/mm3  --  0.03  --   --   --  0.16  --    NRBC /100 WBC  --   --   --   --  0.0  --  1.0*   NEUTROPHIL % % 24.7*  --  49.5 26.0* 13.0* 27.0* 11.0*   MONOCYTES % % 4.1*  --  5.1 6.0 1.0* 7.0 1.0*   BASOPHIL % %  --   --   --   --   --  1.0  --    ATYP LYMPH % % 4.1  --  4.0 1.0 1.0 9.0*  --        Lab Results - Last 18 Months   Lab Units 08/12/20 0922 08/04/20  0723 02/27/20  0833 11/25/19  1310   GLUCOSE mg/dL 112*  --  123* 127*   SODIUM mmol/L 138  --  140 138   POTASSIUM mmol/L 4.5  --  4.4 3.5   CO2 mmol/L 30.0*  --  28.0 29.0   CHLORIDE mmol/L 100  --  102 98   ANION GAP mmol/L 8.0  --  10.0 11.0   CREATININE mg/dL 0.69 1.00 0.74 0.69   BUN mg/dL 11  --  13 13   BUN / CREAT RATIO  15.9  --  17.6 18.8   CALCIUM mg/dL 9.3  --  9.3 9.1   EGFR IF NONAFRICN AM mL/min/1.73 85  --  78 85   ALK PHOS U/L 85  --  101 158*   TOTAL PROTEIN g/dL 6.5  --  7.0 7.3   ALT (SGPT) U/L 19  --  13 56*   AST (SGOT) U/L 22  --  19 74*   BILIRUBIN mg/dL 0.7  --  0.8 0.7   ALBUMIN g/dL 4.50  --  4.60 4.20   GLOBULIN gm/dL 2.0  --  2.4 3.1       Lab Results - Last 18 Months   Lab Units 08/12/20  0922 02/27/20  0833 08/21/19  0821 05/31/19  0749 02/21/19  0815   LDH U/L 215* 213 599 549 518       Lab Results - Last 18 Months    Lab Units 20  0922 20  0833 19  0821 19  0749   IRON mcg/dL  --  86 101 99   TIBC mcg/dL  --  323 295 289   IRON SATURATION %  --  27 34 34   FERRITIN ng/mL 145.40 131.50 53.20 35.60       ASSESSMENT:   1. B cell chronic lymphocytic leukemia (B-CLL):   Stage: 0   Tumor [place]: Lymphocytosis.   Complications of Tumor: None.   Tumor Status: Untreated.   IVGH and p53 mutations: Pending.   CD38: Negative.   ZAP-70: Negative.   Isolated del 13q14.3 (favorable)   LDH: 215, 2020 (prior: 213 - 599)   B2M: 1.8, 2020 (prior: 1.3 - 2.1)   Ig, 2020 (prior: 675 - 742)  2020- WBC 14.59; ALC 9.32, 2020 (range: WBC 13.9-23.99; ALC 8.64-13.83)  2. Normocytic anemia.  Repleted ferritin -145.4, 2020 (from 131.5; from 53.2; from 35; from 23). Improved, Hgb 12.4; MCV 92.2 on 2020 (prior: Hgb 11.3 - 13.3; MCV 87 - 91.3)   3. Irritable bowel syndrome. On Bentyl  4. Gastroparesis. On omeprazole  5. Migraines. On Excedrin migraines  6. Cervical degenerative disk disease.  No meds        7.  Alpha 1 antitrypsin deficiency. Dr. Johnson        8.  Palpitations with echo showing grade II diastolic dysfunction but EF > 70%.  Dr. Hawk follows        9.  Prominent right sterno-clavicular joint. Asymptomatic    RECOMMENDATIONS:   1.   Re: Apprised of labs from 2020, Stable lymphocytosis, normal Hgb, normal platelets, repleted ferritin (> 100) but otherwise repleted iron levels, normal B2M, normal LDH, stable IgG (> 500), normal CMP.  2.  Previously discussed the labs from  and 2019 stable low grade leukocytosis and lymphocytosis (greater than 5000) normal Hgb and normal platelets, normal CMP, normal LDH, normal B2M, repleted serum iron, repleted (> 20%) fe sat, low (< 100) ferritin, repleted B12/folate, negative HIV screen, IgG > 500.         3.  Schedule xrays of the right sternoclavicular joint at [facility] anf obtain ultrasound report done last month at  [facility].        4.  Review CT neck, 08/04/2020.  No soft tissue mass or inflammatory process seen  5.  Review 2D echo, 07/24/2020 showing EF 70-% and grade II diastolic dysfunction.  Is followed by Dr. Hawk  6.  Previously discussed the bone marrow biopsy, 02/01 (above). Confirms B-CLL with del 13q14 (favorable)   7.  Stop OTC iron po (ferritin > 100)  8.  B- CLL again discussed. I had explained that standard therapy has not appreciably altered the nature history of CLL and survival curves demonstrate that CLL is an incurable disease. Randomized studies have failed to show a survival advantage of immediate treatment over delayed treatment for asymptomatic patients with early stage disease. As a result, treatment is generally reserved until the patient has active disease defined as the presence of disease-related symptoms: Weight loss greater than 10%; extreme fatigue; fever greater than 100.5 for 2 weeks with night sweats without evidence of infection (\"B\" symptoms); worsening cytopenias (HGB less than 11; platelets less than 100,000); unexplained recurrent infections; worsening adenopathy, or splenomegaly. She is aware to call should she develop any of these symptoms.    9.  Continue ongoing management per primary care.   10.  Advance Directive discussed.   11. Return to the [place] office in 24 weeks with pre-office serum iron, Fe sat, ferritin, CBC and differential, IgG, LDH, B2M.    QUALITY MEASURES:   MEDICAL DECISION MAKING: Moderate Complexity   AMOUNT OF DATA: Moderate   RISK OF COMPLICATIONS: Low     I spent 33 total minutes, face-to-face, caring for Liset today.  Greater than 50% of this time involved counseling and/or coordination of care as documented within this note regarding the patient's illness(es), pros and cons of various treatment options, instructions and/or risk reduction.    cc: Harry Hastings MD     "

## 2020-08-18 ENCOUNTER — OFFICE VISIT (OUTPATIENT)
Dept: ONCOLOGY | Facility: CLINIC | Age: 70
End: 2020-08-18

## 2020-08-18 VITALS
TEMPERATURE: 97.7 F | BODY MASS INDEX: 23.2 KG/M2 | RESPIRATION RATE: 16 BRPM | HEART RATE: 53 BPM | DIASTOLIC BLOOD PRESSURE: 88 MMHG | WEIGHT: 135.9 LBS | HEIGHT: 64 IN | SYSTOLIC BLOOD PRESSURE: 148 MMHG | OXYGEN SATURATION: 98 %

## 2020-08-18 DIAGNOSIS — C91.10 CLL (CHRONIC LYMPHOCYTIC LEUKEMIA) (HCC): Primary | ICD-10-CM

## 2020-08-18 PROCEDURE — 99214 OFFICE O/P EST MOD 30 MIN: CPT | Performed by: INTERNAL MEDICINE

## 2020-08-18 NOTE — PROGRESS NOTES
Chief Complaint   Patient presents with   • Colonoscopy     10- colon normal 5 year recall       PCP: Harry Hastings MD  REFER: No ref. provider found    Subjective     HPI    Liset Wright is a 69 y.o. female who presents to office for preventative maintenance.  There is  a personal history of colon polyps (she believes Dr Rivera removed polyps when she was in her 30s).  There is not a history of colon cancer.  She does not have complaints of nausea/vomiting, change in bowels, weight loss, no BRBPR, no melena.  There is not a family history of colon cancer.  There is not a family history of colon polyps.  Her last colonoscopy-2015 .  Bowels do move on regular basis with use of Dulcolox.  Following Dr Cedeno for leukemia (diagnosis jan 2019).    CScope (Dr Kaur) 10/2015-normal      Past Medical History:   Diagnosis Date   • Acid reflux    • Arrhythmia    • Arthritis    • Asthma    • Cancer (CMS/HCC)     leukemia   • Hypertension    • Palpitations      Past Surgical History:   Procedure Laterality Date   • CARDIAC CATHETERIZATION     • CHOLECYSTECTOMY     • COLONOSCOPY  10/16/2015    normal   • ENDOSCOPY N/A 11/23/2016    normal   • ENDOSCOPY N/A 12/11/2018    Procedure: ESOPHAGOGASTRODUODENOSCOPY WITH ANESTHESIA;  Surgeon: Grant Kaur DO;  Location: [facility] ENDOSCOPY;  Service: Gastroenterology   • HYSTERECTOMY       Outpatient Medications Marked as Taking for the 8/20/20 encounter (Office Visit) with Kalyan Lazcano APRN   Medication Sig Dispense Refill   • esomeprazole (nexIUM) 40 MG capsule Take 1 capsule by mouth 2 (Two) Times a Day. (Patient taking differently: Take 40 mg by mouth Daily.) 60 capsule 11   • ferrous sulfate 325 (65 FE) MG tablet As Needed.     • metoprolol tartrate (LOPRESSOR) 100 MG tablet Take 100 mg by mouth 2 (Two) Times a Day.     • omeprazole (priLOSEC) 40 MG capsule Take 40 mg by mouth Daily.     • valsartan (DIOVAN) 320 MG tablet Take 320 mg by mouth Daily.        Allergies   Allergen Reactions   • Levofloxacin Paresthesia   • Phenergan [Promethazine Hcl] Other (See Comments)     Jerky movements   • Altace [Ramipril] Palpitations     Social History     Socioeconomic History   • Marital status:      Spouse name: Not on file   • Number of children: Not on file   • Years of education: Not on file   • Highest education level: Not on file   Tobacco Use   • Smoking status: Never Smoker   • Smokeless tobacco: Never Used   Substance and Sexual Activity   • Alcohol use: No   • Drug use: No   • Sexual activity: Defer     Family History   Problem Relation Age of Onset   • Cancer Mother    • Cancer Father         lung   • Cancer Paternal Grandmother         stomach   • No Known Problems Brother    • No Known Problems Maternal Aunt    • No Known Problems Maternal Uncle    • No Known Problems Paternal Aunt    • No Known Problems Paternal Uncle    • No Known Problems Maternal Grandmother    • No Known Problems Maternal Grandfather    • No Known Problems Paternal Grandfather    • No Known Problems Other    • Colon cancer Neg Hx    • Esophageal cancer Neg Hx    • Asthma Neg Hx    • Diabetes Neg Hx    • Emphysema Neg Hx    • Heart failure Neg Hx    • Hypertension Neg Hx      Review of Systems   Constitutional: Negative for fatigue, fever and unexpected weight change.   HENT: Negative for hearing loss, sore throat and voice change.    Eyes: Negative for visual disturbance.   Respiratory: Negative for cough, shortness of breath and wheezing.    Cardiovascular: Negative for chest pain and palpitations.   Gastrointestinal: Negative for abdominal pain, blood in stool and vomiting.   Endocrine: Negative for polydipsia and polyuria.   Genitourinary: Negative for difficulty urinating, dysuria, hematuria and urgency.   Musculoskeletal: Negative for joint swelling and myalgias.   Skin: Negative for color change, rash and wound.   Neurological: Negative for dizziness, tremors, seizures and  syncope.   Hematological: Does not bruise/bleed easily.   Psychiatric/Behavioral: Negative for agitation and confusion. The patient is not nervous/anxious.      Objective   Vitals:    08/20/20 0813   BP: 134/80   Pulse: 60   Temp: 97.5 °F (36.4 °C)   SpO2: 97%     Physical Exam   Constitutional: She is oriented to person, place, and time. She appears well-developed and well-nourished. She is cooperative.   HENT:   Head: Normocephalic and atraumatic.   Eyes: Pupils are equal, round, and reactive to light. Conjunctivae are normal. No scleral icterus.   Neck: Normal range of motion. Neck supple. No JVD present. No thyroid mass and no thyromegaly present.   Cardiovascular: Normal rate, regular rhythm and normal heart sounds. Exam reveals no gallop and no friction rub.   No murmur heard.  Pulmonary/Chest: Effort normal and breath sounds normal. No accessory muscle usage. No respiratory distress. She has no wheezes. She has no rales.   Abdominal: Soft. Normal appearance and bowel sounds are normal. She exhibits no distension, no ascites and no mass. There is no hepatosplenomegaly. There is no tenderness. There is no rebound and no guarding.   Musculoskeletal: Normal range of motion. She exhibits no edema or tenderness.     Vascular Status -  Her right foot exhibits normal foot vasculature  and no edema. Her left foot exhibits normal foot vasculature  and no edema.  Lymphadenopathy:     She has no cervical adenopathy.   Neurological: She is alert and oriented to person, place, and time. She has normal strength. Gait normal.   Skin: Skin is warm, dry and intact. No rash noted.     Imaging Results (Most Recent)     None        Body mass index is 23.34 kg/m².    Assessment/Plan   Liset was seen today for colonoscopy.    Diagnoses and all orders for this visit:    Encounter for screening for malignant neoplasm of colon  -     Case Request; Standing  -     Case Request    Other orders  -     sodium-potassium-magnesium sulfates  (Suprep Bowel Prep Kit) 17.5-3.13-1.6 GM/177ML solution oral solution; Take as directed      COLONOSCOPY WITH ANESTHESIA (N/A)    Patient is to continue all blood pressure and cardiac medications prior to procedure and has been advised to take medications morning of procedure   Pt verbalized understanding    Advised pt to stop ASA, use of NSAIDs, Fish Oil, and MV 5 days prior to procedure, per Dr Kaur protocol.  Tylenol based products are ok to take.  Pt verbalized understanding.      All risks, benefits, alternatives, and indications of colonoscopy procedure have been discussed with the patient. Risks to include perforation of the colon requiring possible surgery or colostomy, risk of bleeding from biopsies or removal of colon tissue, possibility of missing a colon polyp or cancer, or adverse drug reaction.  Benefits to include the diagnosis and management of disease of the colon and rectum. Alternatives to include barium enema, radiographic evaluation, lab testing or no intervention. She verbalizes understanding and agrees.     Precautions are currently being put in place due to COVID-19.  I have explained to Liset Wright they will be required to undergo COVID testing prior to their procedure.  Liset Wright verbalized understanding and was willing to proceed.               Kalyan Lazcano, APRN  08/20/20        There are no Patient Instructions on file for this visit.

## 2020-08-20 ENCOUNTER — OFFICE VISIT (OUTPATIENT)
Dept: GASTROENTEROLOGY | Facility: CLINIC | Age: 70
End: 2020-08-20

## 2020-08-20 VITALS
HEIGHT: 64 IN | HEART RATE: 60 BPM | BODY MASS INDEX: 23.22 KG/M2 | SYSTOLIC BLOOD PRESSURE: 134 MMHG | WEIGHT: 136 LBS | TEMPERATURE: 97.5 F | OXYGEN SATURATION: 97 % | DIASTOLIC BLOOD PRESSURE: 80 MMHG

## 2020-08-20 DIAGNOSIS — Z12.11 ENCOUNTER FOR SCREENING FOR MALIGNANT NEOPLASM OF COLON: Primary | ICD-10-CM

## 2020-08-20 PROCEDURE — S0260 H&P FOR SURGERY: HCPCS | Performed by: NURSE PRACTITIONER

## 2020-08-20 RX ORDER — SODIUM, POTASSIUM,MAG SULFATES 17.5-3.13G
SOLUTION, RECONSTITUTED, ORAL ORAL
Qty: 1 BOTTLE | Refills: 0 | Status: SHIPPED | OUTPATIENT
Start: 2020-08-20 | End: 2020-11-05

## 2020-09-01 ENCOUNTER — APPOINTMENT (OUTPATIENT)
Dept: GENERAL RADIOLOGY | Facility: HOSPITAL | Age: 70
End: 2020-09-01

## 2020-09-02 ENCOUNTER — HOSPITAL ENCOUNTER (OUTPATIENT)
Dept: GENERAL RADIOLOGY | Facility: HOSPITAL | Age: 70
Discharge: HOME | End: 2020-09-02
Admitting: INTERNAL MEDICINE

## 2020-09-02 DIAGNOSIS — C91.10 CLL (CHRONIC LYMPHOCYTIC LEUKEMIA) (HCC): ICD-10-CM

## 2020-09-02 PROCEDURE — 71130 X-RAY STRENOCLAVIC JT 3/>VWS: CPT

## 2020-09-29 ENCOUNTER — TRANSCRIBE ORDERS (OUTPATIENT)
Dept: ADMINISTRATIVE | Facility: HOSPITAL | Age: 70
End: 2020-09-29

## 2020-09-29 DIAGNOSIS — I49.9 CARDIAC ARRHYTHMIA, UNSPECIFIED CARDIAC ARRHYTHMIA TYPE: ICD-10-CM

## 2020-09-29 DIAGNOSIS — I95.9 HYPOTENSION, UNSPECIFIED HYPOTENSION TYPE: Primary | ICD-10-CM

## 2020-10-12 ENCOUNTER — TRANSCRIBE ORDERS (OUTPATIENT)
Dept: LAB | Facility: HOSPITAL | Age: 70
End: 2020-10-12

## 2020-10-12 DIAGNOSIS — Z01.818 PRE-OP TESTING: Primary | ICD-10-CM

## 2020-10-16 ENCOUNTER — LAB (OUTPATIENT)
Dept: LAB | Facility: HOSPITAL | Age: 70
End: 2020-10-16

## 2020-10-16 PROCEDURE — U0003 INFECTIOUS AGENT DETECTION BY NUCLEIC ACID (DNA OR RNA); SEVERE ACUTE RESPIRATORY SYNDROME CORONAVIRUS 2 (SARS-COV-2) (CORONAVIRUS DISEASE [COVID-19]), AMPLIFIED PROBE TECHNIQUE, MAKING USE OF HIGH THROUGHPUT TECHNOLOGIES AS DESCRIBED BY CMS-2020-01-R: HCPCS | Performed by: INTERNAL MEDICINE

## 2020-10-16 PROCEDURE — C9803 HOPD COVID-19 SPEC COLLECT: HCPCS | Performed by: INTERNAL MEDICINE

## 2020-10-17 LAB
COVID LABCORP PRIORITY: NORMAL
SARS-COV-2 RNA RESP QL NAA+PROBE: NOT DETECTED

## 2020-10-19 ENCOUNTER — HOSPITAL ENCOUNTER (OUTPATIENT)
Facility: HOSPITAL | Age: 70
Setting detail: HOSPITAL OUTPATIENT SURGERY
Discharge: HOME | End: 2020-10-19
Attending: INTERNAL MEDICINE | Admitting: INTERNAL MEDICINE

## 2020-10-19 ENCOUNTER — ANESTHESIA EVENT (OUTPATIENT)
Dept: GASTROENTEROLOGY | Facility: HOSPITAL | Age: 70
End: 2020-10-19

## 2020-10-19 ENCOUNTER — ANESTHESIA (OUTPATIENT)
Dept: GASTROENTEROLOGY | Facility: HOSPITAL | Age: 70
End: 2020-10-19

## 2020-10-19 ENCOUNTER — TELEPHONE (OUTPATIENT)
Dept: GASTROENTEROLOGY | Facility: CLINIC | Age: 70
End: 2020-10-19

## 2020-10-19 VITALS
DIASTOLIC BLOOD PRESSURE: 62 MMHG | WEIGHT: 132 LBS | OXYGEN SATURATION: 96 % | HEART RATE: 59 BPM | TEMPERATURE: 97.3 F | HEIGHT: 65 IN | SYSTOLIC BLOOD PRESSURE: 102 MMHG | RESPIRATION RATE: 15 BRPM | BODY MASS INDEX: 21.99 KG/M2

## 2020-10-19 DIAGNOSIS — Z12.11 ENCOUNTER FOR SCREENING FOR MALIGNANT NEOPLASM OF COLON: ICD-10-CM

## 2020-10-19 PROCEDURE — G0105 COLORECTAL SCRN; HI RISK IND: HCPCS | Performed by: INTERNAL MEDICINE

## 2020-10-19 PROCEDURE — 25010000002 PROPOFOL 10 MG/ML EMULSION: Performed by: NURSE ANESTHETIST, CERTIFIED REGISTERED

## 2020-10-19 RX ORDER — PROPOFOL 10 MG/ML
VIAL (ML) INTRAVENOUS AS NEEDED
Status: DISCONTINUED | OUTPATIENT
Start: 2020-10-19 | End: 2020-10-19 | Stop reason: SURG

## 2020-10-19 RX ORDER — SODIUM CHLORIDE 9 MG/ML
500 INJECTION, SOLUTION INTRAVENOUS CONTINUOUS PRN
Status: DISCONTINUED | OUTPATIENT
Start: 2020-10-19 | End: 2020-10-19 | Stop reason: HOSPADM

## 2020-10-19 RX ORDER — LIDOCAINE HYDROCHLORIDE 10 MG/ML
0.5 INJECTION, SOLUTION EPIDURAL; INFILTRATION; INTRACAUDAL; PERINEURAL ONCE AS NEEDED
Status: DISCONTINUED | OUTPATIENT
Start: 2020-10-19 | End: 2020-10-19 | Stop reason: HOSPADM

## 2020-10-19 RX ORDER — SODIUM CHLORIDE 0.9 % (FLUSH) 0.9 %
10 SYRINGE (ML) INJECTION AS NEEDED
Status: DISCONTINUED | OUTPATIENT
Start: 2020-10-19 | End: 2020-10-19 | Stop reason: HOSPADM

## 2020-10-19 RX ADMIN — LIDOCAINE HYDROCHLORIDE 100 MG: 20 INJECTION, SOLUTION INTRAVENOUS at 08:40

## 2020-10-19 RX ADMIN — PROPOFOL 250 MG: 10 INJECTION, EMULSION INTRAVENOUS at 08:40

## 2020-10-19 RX ADMIN — SODIUM CHLORIDE 500 ML: 9 INJECTION, SOLUTION INTRAVENOUS at 08:29

## 2020-10-19 NOTE — ANESTHESIA POSTPROCEDURE EVALUATION
"Patient: Liset Wright    Procedure Summary     Date: 10/19/20 Room / Location: [facility] ENDOSCOPY 4 / [facility] ENDOSCOPY    Anesthesia Start: 0837 Anesthesia Stop: 0853    Procedure: COLONOSCOPY WITH ANESTHESIA (N/A ) Diagnosis:       Encounter for screening for malignant neoplasm of colon      (Encounter for screening for malignant neoplasm of colon [Z12.11])    Surgeon: Grant Kaur DO Provider: Abdirahman Trimble CRNA    Anesthesia Type: MAC ASA Status: 2          Anesthesia Type: MAC    Vitals  Vitals Value Taken Time   BP     Temp     Pulse 59 10/19/20 0853   Resp     SpO2 97 % 10/19/20 0853   Vitals shown include unvalidated device data.        Post Anesthesia Care and Evaluation    Patient location during evaluation: PHASE II  Patient participation: complete - patient participated  Level of consciousness: awake and alert  Pain management: adequate  Airway patency: patent  Anesthetic complications: No anesthetic complications    Cardiovascular status: acceptable  Respiratory status: acceptable  Hydration status: acceptable    Comments: Blood pressure 173/65, pulse 58, temperature 97.3 °F (36.3 °C), temperature source Tympanic, resp. rate 18, height 163.8 cm (64.5\"), weight 59.9 kg (132 lb), SpO2 98 %, not currently breastfeeding.    Pt discharged from PACU based on telma score >8      "

## 2020-10-19 NOTE — H&P
[facility]  Pre Procedure History & Physical    Chief Complaint:   Polyps    Subjective     HPI:   Polyps    Past Medical History:   Past Medical History:   Diagnosis Date   • Acid reflux    • Arrhythmia    • Arthritis    • Asthma    • Cancer (CMS/HCC)     leukemia   • Hypertension    • Palpitations        Past Surgical History:  Past Surgical History:   Procedure Laterality Date   • CARDIAC CATHETERIZATION     • CHOLECYSTECTOMY     • COLONOSCOPY  10/16/2015    normal   • ENDOSCOPY N/A 11/23/2016    normal   • ENDOSCOPY N/A 12/11/2018    Procedure: ESOPHAGOGASTRODUODENOSCOPY WITH ANESTHESIA;  Surgeon: Grant Kaur DO;  Location: [facility] ENDOSCOPY;  Service: Gastroenterology   • HYSTERECTOMY         Family History:  Family History   Problem Relation Age of Onset   • Cancer Mother    • Cancer Father         lung   • Cancer Paternal Grandmother         stomach   • No Known Problems Brother    • No Known Problems Maternal Aunt    • No Known Problems Maternal Uncle    • No Known Problems Paternal Aunt    • No Known Problems Paternal Uncle    • No Known Problems Maternal Grandmother    • No Known Problems Maternal Grandfather    • No Known Problems Paternal Grandfather    • No Known Problems Other    • Colon cancer Neg Hx    • Esophageal cancer Neg Hx    • Asthma Neg Hx    • Diabetes Neg Hx    • Emphysema Neg Hx    • Heart failure Neg Hx    • Hypertension Neg Hx        Social History:   reports that she has never smoked. She has never used smokeless tobacco. She reports that she does not drink alcohol or use drugs.    Medications:   Prior to Admission medications    Medication Sig Start Date End Date Taking? Authorizing Provider   dicyclomine (BENTYL) 10 MG capsule Take 10 mg by mouth 2 (Two) Times a Day.   Yes Provider, MD La Nena   esomeprazole (nexIUM) 40 MG capsule Take 1 capsule by mouth 2 (Two) Times a Day.  Patient taking differently: Take 40 mg by mouth Daily. 1/24/18  Yes Jaleesa  "YENNY Ray   metoprolol tartrate (LOPRESSOR) 100 MG tablet Take 100 mg by mouth 2 (Two) Times a Day.   Yes La Nena Holder MD   omeprazole (priLOSEC) 40 MG capsule Take 40 mg by mouth Daily. 2/11/20  Yes La Nena Holder MD   sodium-potassium-magnesium sulfates (Suprep Bowel Prep Kit) 17.5-3.13-1.6 GM/177ML solution oral solution Take as directed 8/20/20  Yes Kalyan Lazcano APRN   valsartan (DIOVAN) 320 MG tablet Take 320 mg by mouth Daily.   Yes ProviderLa Nena MD   ferrous sulfate 325 (65 FE) MG tablet As Needed. 1/28/20   La Nena Holder MD       Allergies:  Levofloxacin, Phenergan [promethazine hcl], and Altace [ramipril]    ROS:    General: Weight stable  Resp: No SOA  Cardiovascular: No CP    Objective     Blood pressure 173/65, pulse 58, temperature 97.3 °F (36.3 °C), temperature source Tympanic, resp. rate 18, height 163.8 cm (64.5\"), weight 59.9 kg (132 lb), SpO2 98 %, not currently breastfeeding.    Physical Exam   Constitutional: Pt is oriented to person, place, and in no distress.   HENT: Mouth/Throat: Oropharynx is clear.   Cardiovascular: Normal rate, regular rhythm.    Pulmonary/Chest: Effort normal. No respiratory distress. No  wheezes.   Abdominal: Soft. Non-distended.  Skin: Skin is warm and dry.   Psychiatric: Mood, memory, affect and judgment appear normal.     Assessment/Plan     Diagnosis:  Polyps    Anticipated Surgical Procedure:  c-scope    The risks, benefits, and alternatives of this procedure have been discussed with the patient or the responsible party- the patient understands and agrees to proceed.        "

## 2020-10-19 NOTE — ANESTHESIA PREPROCEDURE EVALUATION
Anesthesia Evaluation     no history of anesthetic complications:  NPO Solid Status: > 8 hours             Airway   Mallampati: I  TM distance: >3 FB  Neck ROM: full  Dental      Pulmonary    (+) asthma,  Cardiovascular   Exercise tolerance: excellent (>7 METS)    (+) hypertension,       Neuro/Psych- negative ROS  GI/Hepatic/Renal/Endo    (+)  GERD,      Musculoskeletal     Abdominal    Substance History      OB/GYN          Other                        Anesthesia Plan    ASA 2     MAC       Anesthetic plan, all risks, benefits, and alternatives have been provided, discussed and informed consent has been obtained with: patient.

## 2020-10-22 ENCOUNTER — TRANSCRIBE ORDERS (OUTPATIENT)
Dept: ADMINISTRATIVE | Facility: HOSPITAL | Age: 70
End: 2020-10-22

## 2020-10-22 ENCOUNTER — HOSPITAL ENCOUNTER (OUTPATIENT)
Dept: CARDIOLOGY | Facility: HOSPITAL | Age: 70
Discharge: HOME | End: 2020-10-22

## 2020-10-22 VITALS
BODY MASS INDEX: 22.55 KG/M2 | SYSTOLIC BLOOD PRESSURE: 100 MMHG | HEIGHT: 64 IN | DIASTOLIC BLOOD PRESSURE: 41 MMHG | WEIGHT: 132.06 LBS

## 2020-10-22 DIAGNOSIS — I49.9 CARDIAC ARRHYTHMIA, UNSPECIFIED CARDIAC ARRHYTHMIA TYPE: ICD-10-CM

## 2020-10-22 DIAGNOSIS — I95.9 HYPOTENSION, UNSPECIFIED HYPOTENSION TYPE: Primary | ICD-10-CM

## 2020-10-22 LAB
BH CV ECHO MEAS - AO MAX PG (FULL): 5 MMHG
BH CV ECHO MEAS - AO MAX PG: 9 MMHG
BH CV ECHO MEAS - AO MEAN PG (FULL): 3 MMHG
BH CV ECHO MEAS - AO MEAN PG: 6 MMHG
BH CV ECHO MEAS - AO ROOT AREA (BSA CORRECTED): 1.6
BH CV ECHO MEAS - AO ROOT AREA: 5.7 CM^2
BH CV ECHO MEAS - AO ROOT DIAM: 2.7 CM
BH CV ECHO MEAS - AO V2 MAX: 150 CM/SEC
BH CV ECHO MEAS - AO V2 MEAN: 117 CM/SEC
BH CV ECHO MEAS - AO V2 VTI: 46 CM
BH CV ECHO MEAS - AVA(I,A): 2.2 CM^2
BH CV ECHO MEAS - AVA(I,D): 2.2 CM^2
BH CV ECHO MEAS - AVA(V,A): 2.1 CM^2
BH CV ECHO MEAS - AVA(V,D): 2.1 CM^2
BH CV ECHO MEAS - BSA(HAYCOCK): 1.6 M^2
BH CV ECHO MEAS - BSA: 1.6 M^2
BH CV ECHO MEAS - BZI_BMI: 22.7 KILOGRAMS/M^2
BH CV ECHO MEAS - BZI_METRIC_HEIGHT: 162.6 CM
BH CV ECHO MEAS - BZI_METRIC_WEIGHT: 59.9 KG
BH CV ECHO MEAS - EDV(CUBED): 67.4 ML
BH CV ECHO MEAS - EDV(MOD-SP4): 46.8 ML
BH CV ECHO MEAS - EDV(TEICH): 72.9 ML
BH CV ECHO MEAS - EF(CUBED): 77.9 %
BH CV ECHO MEAS - EF(MOD-SP4): 68.8 %
BH CV ECHO MEAS - EF(TEICH): 70.6 %
BH CV ECHO MEAS - ESV(CUBED): 14.9 ML
BH CV ECHO MEAS - ESV(MOD-SP4): 14.6 ML
BH CV ECHO MEAS - ESV(TEICH): 21.4 ML
BH CV ECHO MEAS - FS: 39.6 %
BH CV ECHO MEAS - IVS/LVPW: 0.87
BH CV ECHO MEAS - IVSD: 0.89 CM
BH CV ECHO MEAS - LA DIMENSION: 3.4 CM
BH CV ECHO MEAS - LA/AO: 1.3
BH CV ECHO MEAS - LAT PEAK E' VEL: 9.2 CM/SEC
BH CV ECHO MEAS - LV DIASTOLIC VOL/BSA (35-75): 28.5 ML/M^2
BH CV ECHO MEAS - LV MASS(C)D: 123.6 GRAMS
BH CV ECHO MEAS - LV MASS(C)DI: 75.4 GRAMS/M^2
BH CV ECHO MEAS - LV MAX PG: 4 MMHG
BH CV ECHO MEAS - LV MEAN PG: 3 MMHG
BH CV ECHO MEAS - LV SYSTOLIC VOL/BSA (12-30): 8.9 ML/M^2
BH CV ECHO MEAS - LV V1 MAX: 100 CM/SEC
BH CV ECHO MEAS - LV V1 MEAN: 75.4 CM/SEC
BH CV ECHO MEAS - LV V1 VTI: 31.5 CM
BH CV ECHO MEAS - LVIDD: 4.1 CM
BH CV ECHO MEAS - LVIDS: 2.5 CM
BH CV ECHO MEAS - LVLD AP4: 6.7 CM
BH CV ECHO MEAS - LVLS AP4: 5.2 CM
BH CV ECHO MEAS - LVOT AREA (M): 3.1 CM^2
BH CV ECHO MEAS - LVOT AREA: 3.1 CM^2
BH CV ECHO MEAS - LVOT DIAM: 2 CM
BH CV ECHO MEAS - LVPWD: 1 CM
BH CV ECHO MEAS - MED PEAK E' VEL: 6.8 CM/SEC
BH CV ECHO MEAS - MR MAX PG: 85.7 MMHG
BH CV ECHO MEAS - MR MAX VEL: 463 CM/SEC
BH CV ECHO MEAS - MR MEAN PG: 56 MMHG
BH CV ECHO MEAS - MR MEAN VEL: 360 CM/SEC
BH CV ECHO MEAS - MR VTI: 197 CM
BH CV ECHO MEAS - MV A MAX VEL: 64.6 CM/SEC
BH CV ECHO MEAS - MV DEC SLOPE: 513 CM/SEC^2
BH CV ECHO MEAS - MV DEC TIME: 0.24 SEC
BH CV ECHO MEAS - MV E MAX VEL: 125 CM/SEC
BH CV ECHO MEAS - MV E/A: 1.9
BH CV ECHO MEAS - RAP SYSTOLE: 10 MMHG
BH CV ECHO MEAS - RVSP: 40.7 MMHG
BH CV ECHO MEAS - SI(AO): 160.7 ML/M^2
BH CV ECHO MEAS - SI(CUBED): 32 ML/M^2
BH CV ECHO MEAS - SI(LVOT): 60.4 ML/M^2
BH CV ECHO MEAS - SI(MOD-SP4): 19.6 ML/M^2
BH CV ECHO MEAS - SI(TEICH): 31.4 ML/M^2
BH CV ECHO MEAS - SV(AO): 263.4 ML
BH CV ECHO MEAS - SV(CUBED): 52.5 ML
BH CV ECHO MEAS - SV(LVOT): 99 ML
BH CV ECHO MEAS - SV(MOD-SP4): 32.2 ML
BH CV ECHO MEAS - SV(TEICH): 51.5 ML
BH CV ECHO MEAS - TR MAX VEL: 277 CM/SEC
BH CV ECHO MEASUREMENTS AVERAGE E/E' RATIO: 15.63
LEFT ATRIUM VOLUME INDEX: 28.8 ML/M2
LEFT ATRIUM VOLUME: 47.2 CM3
LV EF 2D ECHO EST: 65 %

## 2020-10-22 PROCEDURE — 93010 ELECTROCARDIOGRAM REPORT: CPT | Performed by: INTERNAL MEDICINE

## 2020-10-22 PROCEDURE — 93306 TTE W/DOPPLER COMPLETE: CPT

## 2020-10-22 PROCEDURE — 0296T HC EXT ECG > 48HR TO 21 DAY RCRD W/CONECT INTL RCRD: CPT

## 2020-10-22 PROCEDURE — 93356 MYOCRD STRAIN IMG SPCKL TRCK: CPT | Performed by: INTERNAL MEDICINE

## 2020-10-22 PROCEDURE — 93306 TTE W/DOPPLER COMPLETE: CPT | Performed by: INTERNAL MEDICINE

## 2020-10-22 PROCEDURE — 93356 MYOCRD STRAIN IMG SPCKL TRCK: CPT

## 2020-10-22 PROCEDURE — 93005 ELECTROCARDIOGRAM TRACING: CPT

## 2020-11-04 PROCEDURE — 0298T PR EXT ECG > 48HR TO 21 DAY REVIEW AND INTERPRETATN: CPT | Performed by: INTERNAL MEDICINE

## 2020-11-05 ENCOUNTER — OFFICE VISIT (OUTPATIENT)
Dept: CARDIOLOGY | Facility: CLINIC | Age: 70
End: 2020-11-05

## 2020-11-05 VITALS
HEIGHT: 64 IN | SYSTOLIC BLOOD PRESSURE: 162 MMHG | DIASTOLIC BLOOD PRESSURE: 78 MMHG | WEIGHT: 135 LBS | OXYGEN SATURATION: 98 % | HEART RATE: 53 BPM | BODY MASS INDEX: 23.05 KG/M2

## 2020-11-05 DIAGNOSIS — J98.4 RESTRICTIVE LUNG DISEASE: ICD-10-CM

## 2020-11-05 DIAGNOSIS — I49.3 PVCS (PREMATURE VENTRICULAR CONTRACTIONS): ICD-10-CM

## 2020-11-05 DIAGNOSIS — I51.7 RIGHT VENTRICULAR DILATION: ICD-10-CM

## 2020-11-05 DIAGNOSIS — R00.2 PALPITATIONS: Primary | ICD-10-CM

## 2020-11-05 DIAGNOSIS — C91.10 CLL (CHRONIC LYMPHOCYTIC LEUKEMIA) (HCC): ICD-10-CM

## 2020-11-05 DIAGNOSIS — I10 ESSENTIAL HYPERTENSION: ICD-10-CM

## 2020-11-05 PROCEDURE — 99213 OFFICE O/P EST LOW 20 MIN: CPT | Performed by: NURSE PRACTITIONER

## 2020-11-05 PROCEDURE — 93000 ELECTROCARDIOGRAM COMPLETE: CPT | Performed by: NURSE PRACTITIONER

## 2020-11-05 NOTE — PROGRESS NOTES
Subjective:     Encounter Date:11/05/2020      Patient ID: Liset Wright is a 70 y.o. female with a history of symptomatic PVCs, HTN, leukemia, and HLD.    Chief Complaint: follow up  Hypertension  This is a chronic problem. The current episode started more than 1 year ago. The problem has been rapidly improving since onset. The problem is controlled. Pertinent negatives include no chest pain, malaise/fatigue, orthopnea, palpitations, PND or shortness of breath.   Hyperlipidemia  This is a chronic problem. The current episode started more than 1 year ago. The problem is controlled. Recent lipid tests were reviewed and are normal. Pertinent negatives include no chest pain or shortness of breath.   Palpitations   This is a chronic problem. The current episode started more than 1 year ago. The problem occurs intermittently. The problem has been rapidly improving. Pertinent negatives include no chest pain, coughing, dizziness, irregular heartbeat, malaise/fatigue, near-syncope, shortness of breath, syncope or weakness.     Patient presents today for a routine follow up. Patient has a history of symptomatic PVCs that has been well controlled with beta blocker therapy. She had a holter ordered by her PCP for hypotension that revealed 2 short episodes of PSVT. She also had a 2D echo completed that revealed a normal LVEF, grade 2 diastolic dysfunction, trace-mild MR, mild TR, and mild-moderately dilated RV with normal systolic function. She reports her hypotension readings are when her bottom number is 40-50. She reports her SBP ranging 130-140 at most checks. She reports her her palpitations are essentially absent. She has chronic shortness of breath that remains unchanged from previous. She follows with pulmonary for her restrictive lung disease- asthma. She denies chest pain, edema, syncope and near syncope.     The following portions of the patient's history were reviewed and updated as appropriate: allergies,  current medications, past family history, past medical history, past social history, past surgical history and problem list.    Allergies   Allergen Reactions   • Levofloxacin Paresthesia   • Phenergan [Promethazine Hcl] Other (See Comments)     Jerky movements   • Altace [Ramipril] Palpitations       Current Outpatient Medications:   •  dicyclomine (BENTYL) 10 MG capsule, Take 10 mg by mouth 2 (Two) Times a Day., Disp: , Rfl:   •  esomeprazole (nexIUM) 40 MG capsule, Take 1 capsule by mouth 2 (Two) Times a Day. (Patient taking differently: Take 40 mg by mouth Daily.), Disp: 60 capsule, Rfl: 11  •  ferrous sulfate 325 (65 FE) MG tablet, As Needed., Disp: , Rfl:   •  metoprolol tartrate (LOPRESSOR) 100 MG tablet, Take 100 mg by mouth 2 (Two) Times a Day., Disp: , Rfl:   •  omeprazole (priLOSEC) 40 MG capsule, Take 40 mg by mouth Daily., Disp: , Rfl:   •  valsartan (DIOVAN) 320 MG tablet, Take 320 mg by mouth Daily., Disp: , Rfl:   Past Medical History:   Diagnosis Date   • Acid reflux    • Alpha-1-antitrypsin deficiency (CMS/HCC)    • Arrhythmia    • Arthritis    • Asthma    • Cancer (CMS/HCC)     leukemia   • Hypertension    • Palpitations        Social History     Socioeconomic History   • Marital status:      Spouse name: Not on file   • Number of children: Not on file   • Years of education: Not on file   • Highest education level: Not on file   Tobacco Use   • Smoking status: Never Smoker   • Smokeless tobacco: Never Used   Substance and Sexual Activity   • Alcohol use: No   • Drug use: No   • Sexual activity: Defer       Review of Systems   Constitution: Negative for malaise/fatigue, weight gain and weight loss.   Cardiovascular: Positive for dyspnea on exertion. Negative for chest pain, irregular heartbeat, leg swelling, near-syncope, orthopnea, palpitations, paroxysmal nocturnal dyspnea and syncope.   Respiratory: Negative for cough, shortness of breath, sleep disturbances due to breathing, sputum  production and wheezing.    Skin: Negative for dry skin, flushing, itching and rash.   Gastrointestinal: Negative for hematemesis and hematochezia.   Neurological: Negative for dizziness, light-headedness, loss of balance and weakness.   All other systems reviewed and are negative.         Objective:     Vitals signs reviewed.   Constitutional:       General: Not in acute distress.     Appearance: Well-developed. Not diaphoretic.   Eyes:      General: No scleral icterus.     Conjunctiva/sclera: Conjunctivae normal.      Pupils: Pupils are equal, round, and reactive to light.   HENT:      Head: Normocephalic.    Mouth/Throat:      Pharynx: No oropharyngeal exudate.   Neck:      Musculoskeletal: Normal range of motion and neck supple.   Pulmonary:      Effort: Pulmonary effort is normal. No respiratory distress.      Breath sounds: Normal breath sounds. No wheezing. No rales.   Chest:      Chest wall: Not tender to palpatation.   Cardiovascular:      Normal rate. Regular rhythm.   Pulses:     Intact distal pulses.   Abdominal:      General: Bowel sounds are normal. There is no distension.      Palpations: Abdomen is soft.      Tenderness: There is no abdominal tenderness.   Musculoskeletal: Normal range of motion.   Skin:     General: Skin is warm and dry.      Coloration: Skin is not pale.      Findings: No erythema or rash.   Neurological:      Mental Status: Alert and oriented to person, place, and time.      Deep Tendon Reflexes: Reflexes are normal and symmetric.   Psychiatric:         Behavior: Behavior normal.             ECG 12 Lead    Date/Time: 11/5/2020 2:46 PM  Performed by: Caitie Sanchez APRN  Authorized by: Caitie Sanchez APRN   Comparison: compared with previous ECG from 10/22/2020  Similar to previous ECG  Rhythm: sinus bradycardia  Rate: bradycardic  BPM: 53  Conduction: conduction normal  Other findings: T wave abnormality and left ventricular hypertrophy    Clinical impression: abnormal  "EKG          /78   Pulse 53   Ht 162.6 cm (64\")   Wt 61.2 kg (135 lb)   SpO2 98%   BMI 23.17 kg/m²     Lab Review:   I have reviewed previous office notes, recent labs and recent cardiac testing.     Lab Results   Component Value Date    CHLPL 183 08/23/2018    TRIG 55 08/23/2018    HDL 83 08/23/2018    LDL 89 08/23/2018     holter 10/22:   Study Impressions    An abnormal monitor study. 2 short asymptomatic runs of PSVT     Results for orders placed in visit on 09/29/20   Adult Transthoracic Echo Complete W/ Cont if Necessary Per Protocol    Narrative · Left ventricular wall thickness is consistent with concentric   hypertrophy.  · Estimated left ventricular EF = 65% Left ventricular systolic function   is normal.  · Left ventricular diastolic function is consistent with (grade II w/high   LAP) pseudonormalization.  · The left atrial cavity is mildly dilated.  · Trace to mild mitral valve regurgitation is present.  · Mild tricuspid valve regurgitation is present.  · Estimated right ventricular systolic pressure from tricuspid   regurgitation is mildly elevated (35-45 mmHg).  · The right ventricular cavity is mild to moderately dilated.            Assessment:          Diagnosis Plan   1. Palpitations     2. PVCs (premature ventricular contractions)     3. CLL (chronic lymphocytic leukemia) (CMS/HCC)     4. Essential hypertension     5. Restrictive lung disease     6. Right ventricular dilation            Plan:       1. Palpitations- stable and absent with lopressor. Holter revealed 2 short PSVT episodes.   2. PVCs- stable. Controlled with lopressor.   3. CLL- stable. Followed by oncology  4. HTN- slightly elevated today. Recommend she follow up with her PCP for further medication adjustment.   5. Restrictive lung disease- stable. Followed by pulmonary. PFTs to be done next year.   6. RV dilation- mild-moderate. Will talk with Dr. Hawk to see if any further workup is needed.       Follow up in 1 year or " sooner if symptoms worsen.

## 2020-11-06 ENCOUNTER — TELEPHONE (OUTPATIENT)
Dept: CARDIOLOGY | Facility: CLINIC | Age: 70
End: 2020-11-06

## 2020-11-06 NOTE — TELEPHONE ENCOUNTER
----- Message from YENNY Ruiz sent at 11/6/2020  8:30 AM CST -----  Please let patient know I talked with Dr. Hawk about the increase in her right ventricle size and he said no further workup was necessary at this time.   ----- Message -----  From: Blayne Hawk MD  Sent: 11/6/2020   7:42 AM CST  To: YENNY Ruiz    No, further workup not necessary   ----- Message -----  From: Caitie Sanchez APRN  Sent: 11/5/2020   2:48 PM CST  To: Blayne Hawk MD    Her RV went from normal to mild-moderately dilated. Do I need to do any work up? She is asymptomatic with chronic shortness of breath related to her asthma.

## 2020-11-06 NOTE — PROGRESS NOTES
Chief Complaint   Patient presents with   • Difficulty Swallowing   • Abdominal Pain   • Heartburn     last endo 12/2018- normal       PCP: Harry Hastings MD  REFER: No ref. provider found    Subjective     HPI    Intermittent chest pain.  She does occasional experience trouble swallowing solid food.  She has more trouble with meat and bread.  Repeated drinking provides relief of dysphagia.  She feels symptoms have improved with use of prilosec in morning and OTC nexium in evening.  She has burning in esophagus and to KIESHA area of abdomen.  No brpbr, melena.  No change in bowels.   She utilizes Excedrin Migrain on regular basis. No weight loss.      [facility] (Dr Kaur) 10/2020-normal      Past Medical History:   Diagnosis Date   • Acid reflux    • Alpha-1-antitrypsin deficiency (CMS/HCC)    • Arrhythmia    • Arthritis    • Asthma    • Cancer (CMS/HCC)     leukemia   • Hypertension    • Palpitations        Past Surgical History:   Procedure Laterality Date   • CARDIAC CATHETERIZATION     • CHOLECYSTECTOMY     • COLONOSCOPY  10/16/2015    normal   • COLONOSCOPY N/A 10/19/2020    Procedure: COLONOSCOPY WITH ANESTHESIA;  Surgeon: Grant Kaur DO;  Location: [facility] ENDOSCOPY;  Service: Gastroenterology;  Laterality: N/A;  pre: screening  post: normal  Harry Hastings MD   • ENDOSCOPY N/A 11/23/2016    normal   • ENDOSCOPY N/A 12/11/2018    Procedure: ESOPHAGOGASTRODUODENOSCOPY WITH ANESTHESIA;  Surgeon: Grant Kaur DO;  Location: [facility] ENDOSCOPY;  Service: Gastroenterology   • HYSTERECTOMY         Outpatient Medications Marked as Taking for the 11/9/20 encounter (Office Visit) with Kalyan Lazcano APRN   Medication Sig Dispense Refill   • dicyclomine (BENTYL) 10 MG capsule Take 10 mg by mouth 2 (Two) Times a Day.     • esomeprazole (nexIUM) 40 MG capsule Take 1 capsule by mouth 2 (Two) Times a Day. (Patient taking differently: Take 40 mg by mouth Daily.) 60 capsule 11   • ferrous sulfate 325 (65 FE)  MG tablet As Needed.     • metoprolol tartrate (LOPRESSOR) 100 MG tablet Take 100 mg by mouth 2 (Two) Times a Day.     • omeprazole (priLOSEC) 40 MG capsule Take 40 mg by mouth Daily.     • valsartan (DIOVAN) 320 MG tablet Take 320 mg by mouth Daily.         Allergies   Allergen Reactions   • Levofloxacin Paresthesia   • Phenergan [Promethazine Hcl] Other (See Comments)     Jerky movements   • Altace [Ramipril] Palpitations       Social History     Socioeconomic History   • Marital status:      Spouse name: Not on file   • Number of children: Not on file   • Years of education: Not on file   • Highest education level: Not on file   Tobacco Use   • Smoking status: Never Smoker   • Smokeless tobacco: Never Used   Substance and Sexual Activity   • Alcohol use: No   • Drug use: No   • Sexual activity: Defer       Family History   Problem Relation Age of Onset   • Cancer Mother    • Cancer Father         lung   • Cancer Paternal Grandmother         stomach   • No Known Problems Brother    • No Known Problems Maternal Aunt    • No Known Problems Maternal Uncle    • No Known Problems Paternal Aunt    • No Known Problems Paternal Uncle    • No Known Problems Maternal Grandmother    • No Known Problems Maternal Grandfather    • No Known Problems Paternal Grandfather    • No Known Problems Other    • Colon cancer Neg Hx    • Esophageal cancer Neg Hx    • Asthma Neg Hx    • Diabetes Neg Hx    • Emphysema Neg Hx    • Heart failure Neg Hx    • Hypertension Neg Hx    • Colon polyps Neg Hx        Review of Systems   Constitutional: Negative for fatigue, fever and unexpected weight change.   HENT: Positive for trouble swallowing. Negative for hearing loss, sore throat and voice change.    Eyes: Negative for visual disturbance.   Respiratory: Negative for cough, shortness of breath and wheezing.    Cardiovascular: Positive for chest pain. Negative for palpitations.   Gastrointestinal: Negative for abdominal pain, blood in  "stool and vomiting.   Endocrine: Negative for polydipsia and polyuria.   Genitourinary: Negative for difficulty urinating, dysuria, hematuria and urgency.   Musculoskeletal: Negative for joint swelling and myalgias.   Skin: Negative for color change, rash and wound.   Neurological: Negative for dizziness, tremors, seizures and syncope.   Hematological: Does not bruise/bleed easily.   Psychiatric/Behavioral: Negative for agitation and confusion. The patient is not nervous/anxious.        Objective     Vitals:    11/09/20 1357   BP: (!) 132/102   Pulse: 59   Temp: 97.3 °F (36.3 °C)   SpO2: 97%   Weight: 61.2 kg (135 lb)   Height: 162.6 cm (64\")     Body mass index is 23.17 kg/m².    Physical Exam  Constitutional:       Appearance: She is well-developed.   HENT:      Head: Normocephalic and atraumatic.   Eyes:      General: No scleral icterus.     Conjunctiva/sclera: Conjunctivae normal.      Pupils: Pupils are equal, round, and reactive to light.   Neck:      Thyroid: No thyroid mass or thyromegaly.      Vascular: No JVD.   Cardiovascular:      Rate and Rhythm: Normal rate and regular rhythm.      Heart sounds: Normal heart sounds. No murmur. No friction rub. No gallop.    Pulmonary:      Effort: Pulmonary effort is normal. No accessory muscle usage or respiratory distress.      Breath sounds: Normal breath sounds. No wheezing or rales.   Abdominal:      General: Bowel sounds are normal. There is no distension.      Palpations: Abdomen is soft. There is no hepatomegaly, splenomegaly or mass.      Tenderness: There is no abdominal tenderness. There is no guarding or rebound.   Genitourinary:     Comments: Rectal-Did not examine  Musculoskeletal: Normal range of motion.   Skin:     General: Skin is warm and dry.   Neurological:      Mental Status: She is alert and oriented to person, place, and time.      Comments: Deemed a reliable historian, able to converse without difficulty and able to move all extremities without " difficulty   Psychiatric:         Behavior: Behavior normal.         Imaging Results (Most Recent)     None          Body mass index is 23.17 kg/m².    Assessment/Plan     Diagnoses and all orders for this visit:    1. Chest pain, unspecified type (Primary)  -     Case Request; Standing  -     Implement Anesthesia Orders Day of Procedure; Standing  -     Obtain Informed Consent; Standing  -     Case Request        ESOPHAGOGASTRODUODENOSCOPY WITH ANESTHESIA (N/A)    Take PPI 30 min prior to breakfast   Decrease caffeine, nicotine, etoh- all contribute to acid reflux  Do not eat 2-3 hours within lying down, elevated HOB 4-6 inches    The risk of the endoscopy were discussed in detail.  We discussed the risk of perforation (one out of 0129-7851, riskier with dilation), bleeding (one out of 500), and the rare risks of infection, adverse reaction to anesthesia, respiratory failure, cardiac failure including MI and adverse reaction to medications, etc.  We discussed consequences that could occur if a risk were to develop such as the need for hospitalization, blood transfusion, surgical intervention, medications, pain and disability and death.  Alternatives include not doing anything, or pursuing an UGI series which only offers a diagnosis with potential less accuracy compared to egd.  The patient verbalizes understanding and agrees to proceed.    Precautions are currently being put in place due to COVID-19.  I have explained to Liset ALVAREZ Wright they will be required to undergo COVID testing prior to their procedure.  Liset Wright verbalized understanding and was willing to proceed.           Kalyan Lazcano, APRN  11/10/20          Patient Instructions   Gastroesophageal Reflux Disease, Adult    Gastroesophageal reflux disease (GERD) happens when acid from your stomach flows up into the esophagus. When acid comes in contact with the esophagus, the acid causes soreness (inflammation) in the esophagus. Over time, GERD  may create small holes (ulcers) in the lining of the esophagus.  CAUSES    · Increased body weight. This puts pressure on the stomach, making acid rise from the stomach into the esophagus.  · Smoking. This increases acid production in the stomach.  · Drinking alcohol. This causes decreased pressure in the lower esophageal sphincter (valve or ring of muscle between the esophagus and stomach), allowing acid from the stomach into the esophagus.  · Late evening meals and a full stomach. This increases pressure and acid production in the stomach.  · A malformed lower esophageal sphincter.  Sometimes, no cause is found.  SYMPTOMS    · Burning pain in the lower part of the mid-chest behind the breastbone and in the mid-stomach area. This may occur twice a week or more often.  · Trouble swallowing.  · Sore throat.  · Dry cough.  · Asthma-like symptoms including chest tightness, shortness of breath, or wheezing.  DIAGNOSIS    Your caregiver may be able to diagnose GERD based on your symptoms. In some cases, X-rays and other tests may be done to check for complications or to check the condition of your stomach and esophagus.  TREATMENT    Your caregiver may recommend over-the-counter or prescription medicines to help decrease acid production. Ask your caregiver before starting or adding any new medicines.    HOME CARE INSTRUCTIONS    · Change the factors that you can control. Ask your caregiver for guidance concerning weight loss, quitting smoking, and alcohol consumption.  · Avoid foods and drinks that make your symptoms worse, such as:  ¨ Caffeine or alcoholic drinks.  ¨ Chocolate.  ¨ Peppermint or mint flavorings.  ¨ Garlic and onions.  ¨ Spicy foods.  ¨ Citrus fruits, such as oranges, kely, or limes.  ¨ Tomato-based foods such as sauce, chili, salsa, and pizza.  ¨ Fried and fatty foods.  · Avoid lying down for the 3 hours prior to your bedtime or prior to taking a nap.  · Eat small, frequent meals instead of large  meals.  · Wear loose-fitting clothing. Do not wear anything tight around your waist that causes pressure on your stomach.  · Raise the head of your bed 6 to 8 inches with wood blocks to help you sleep. Extra pillows will not help.  · Only take over-the-counter or prescription medicines for pain, discomfort, or fever as directed by your caregiver.  · Do not take aspirin, ibuprofen, or other nonsteroidal anti-inflammatory drugs (NSAIDs).  SEEK IMMEDIATE MEDICAL CARE IF:    · You have pain in your arms, neck, jaw, teeth, or back.  · Your pain increases or changes in intensity or duration.  · You develop nausea, vomiting, or sweating (diaphoresis).  · You develop shortness of breath, or you faint.  · Your vomit is green, yellow, black, or looks like coffee grounds or blood.  · Your stool is red, bloody, or black.  These symptoms could be signs of other problems, such as heart disease, gastric bleeding, or esophageal bleeding.  MAKE SURE YOU:    · Understand these instructions.  · Will watch your condition.  · Will get help right away if you are not doing well or get worse.     This information is not intended to replace advice given to you by your health care provider. Make sure you discuss any questions you have with your health care provider.     Document Released: 09/27/2006 Document Revised: 01/08/2016 Document Reviewed: 04/13/2016  [employer] Interactive Patient Education ©2016 [employer]

## 2020-11-06 NOTE — TELEPHONE ENCOUNTER
The patient is called and notified no further workup is needed for the increased size of her right ventricle.  She voices understanding.  Leslie Wolfe MA

## 2020-11-09 ENCOUNTER — OFFICE VISIT (OUTPATIENT)
Dept: GASTROENTEROLOGY | Facility: CLINIC | Age: 70
End: 2020-11-09

## 2020-11-09 VITALS
DIASTOLIC BLOOD PRESSURE: 102 MMHG | WEIGHT: 135 LBS | HEIGHT: 64 IN | OXYGEN SATURATION: 97 % | HEART RATE: 59 BPM | BODY MASS INDEX: 23.05 KG/M2 | TEMPERATURE: 97.3 F | SYSTOLIC BLOOD PRESSURE: 132 MMHG

## 2020-11-09 DIAGNOSIS — R07.9 CHEST PAIN, UNSPECIFIED TYPE: Primary | ICD-10-CM

## 2020-11-09 PROCEDURE — 99214 OFFICE O/P EST MOD 30 MIN: CPT | Performed by: NURSE PRACTITIONER

## 2020-11-09 NOTE — PATIENT INSTRUCTIONS
Gastroesophageal Reflux Disease, Adult    Gastroesophageal reflux disease (GERD) happens when acid from your stomach flows up into the esophagus. When acid comes in contact with the esophagus, the acid causes soreness (inflammation) in the esophagus. Over time, GERD may create small holes (ulcers) in the lining of the esophagus.  CAUSES    · Increased body weight. This puts pressure on the stomach, making acid rise from the stomach into the esophagus.  · Smoking. This increases acid production in the stomach.  · Drinking alcohol. This causes decreased pressure in the lower esophageal sphincter (valve or ring of muscle between the esophagus and stomach), allowing acid from the stomach into the esophagus.  · Late evening meals and a full stomach. This increases pressure and acid production in the stomach.  · A malformed lower esophageal sphincter.  Sometimes, no cause is found.  SYMPTOMS    · Burning pain in the lower part of the mid-chest behind the breastbone and in the mid-stomach area. This may occur twice a week or more often.  · Trouble swallowing.  · Sore throat.  · Dry cough.  · Asthma-like symptoms including chest tightness, shortness of breath, or wheezing.  DIAGNOSIS    Your caregiver may be able to diagnose GERD based on your symptoms. In some cases, X-rays and other tests may be done to check for complications or to check the condition of your stomach and esophagus.  TREATMENT    Your caregiver may recommend over-the-counter or prescription medicines to help decrease acid production. Ask your caregiver before starting or adding any new medicines.    HOME CARE INSTRUCTIONS    · Change the factors that you can control. Ask your caregiver for guidance concerning weight loss, quitting smoking, and alcohol consumption.  · Avoid foods and drinks that make your symptoms worse, such as:  ¨ Caffeine or alcoholic drinks.  ¨ Chocolate.  ¨ Peppermint or mint flavorings.  ¨ Garlic and onions.  ¨ Spicy foods.  ¨ Citrus  fruits, such as oranges, kely, or limes.  ¨ Tomato-based foods such as sauce, chili, salsa, and pizza.  ¨ Fried and fatty foods.  · Avoid lying down for the 3 hours prior to your bedtime or prior to taking a nap.  · Eat small, frequent meals instead of large meals.  · Wear loose-fitting clothing. Do not wear anything tight around your waist that causes pressure on your stomach.  · Raise the head of your bed 6 to 8 inches with wood blocks to help you sleep. Extra pillows will not help.  · Only take over-the-counter or prescription medicines for pain, discomfort, or fever as directed by your caregiver.  · Do not take aspirin, ibuprofen, or other nonsteroidal anti-inflammatory drugs (NSAIDs).  SEEK IMMEDIATE MEDICAL CARE IF:    · You have pain in your arms, neck, jaw, teeth, or back.  · Your pain increases or changes in intensity or duration.  · You develop nausea, vomiting, or sweating (diaphoresis).  · You develop shortness of breath, or you faint.  · Your vomit is green, yellow, black, or looks like coffee grounds or blood.  · Your stool is red, bloody, or black.  These symptoms could be signs of other problems, such as heart disease, gastric bleeding, or esophageal bleeding.  MAKE SURE YOU:    · Understand these instructions.  · Will watch your condition.  · Will get help right away if you are not doing well or get worse.     This information is not intended to replace advice given to you by your health care provider. Make sure you discuss any questions you have with your health care provider.     Document Released: 09/27/2006 Document Revised: 01/08/2016 Document Reviewed: 04/13/2016  [employer] Interactive Patient Education ©2016 [employer]

## 2020-11-10 PROBLEM — R07.9 CHEST PAIN: Status: ACTIVE | Noted: 2020-11-10

## 2020-11-17 ENCOUNTER — TRANSCRIBE ORDERS (OUTPATIENT)
Dept: LAB | Facility: HOSPITAL | Age: 70
End: 2020-11-17

## 2020-11-17 DIAGNOSIS — Z01.818 PRE-OP TESTING: Primary | ICD-10-CM

## 2020-11-23 ENCOUNTER — LAB (OUTPATIENT)
Dept: LAB | Facility: HOSPITAL | Age: 70
End: 2020-11-23

## 2020-11-23 LAB — SARS-COV-2 RNA PNL SPEC NAA+PROBE: NOT DETECTED

## 2020-11-23 PROCEDURE — 87635 SARS-COV-2 COVID-19 AMP PRB: CPT | Performed by: INTERNAL MEDICINE

## 2020-11-23 PROCEDURE — C9803 HOPD COVID-19 SPEC COLLECT: HCPCS | Performed by: INTERNAL MEDICINE

## 2020-11-25 ENCOUNTER — ANESTHESIA (OUTPATIENT)
Dept: GASTROENTEROLOGY | Facility: HOSPITAL | Age: 70
End: 2020-11-25

## 2020-11-25 ENCOUNTER — HOSPITAL ENCOUNTER (OUTPATIENT)
Facility: HOSPITAL | Age: 70
Setting detail: HOSPITAL OUTPATIENT SURGERY
Discharge: HOME | End: 2020-11-25
Attending: INTERNAL MEDICINE | Admitting: INTERNAL MEDICINE

## 2020-11-25 ENCOUNTER — ANESTHESIA EVENT (OUTPATIENT)
Dept: GASTROENTEROLOGY | Facility: HOSPITAL | Age: 70
End: 2020-11-25

## 2020-11-25 VITALS
OXYGEN SATURATION: 97 % | HEIGHT: 64 IN | DIASTOLIC BLOOD PRESSURE: 52 MMHG | SYSTOLIC BLOOD PRESSURE: 122 MMHG | BODY MASS INDEX: 23.22 KG/M2 | WEIGHT: 136 LBS | TEMPERATURE: 97.6 F | RESPIRATION RATE: 15 BRPM | HEART RATE: 57 BPM

## 2020-11-25 DIAGNOSIS — R07.9 CHEST PAIN, UNSPECIFIED TYPE: ICD-10-CM

## 2020-11-25 PROCEDURE — 43239 EGD BIOPSY SINGLE/MULTIPLE: CPT | Performed by: INTERNAL MEDICINE

## 2020-11-25 PROCEDURE — 87081 CULTURE SCREEN ONLY: CPT | Performed by: INTERNAL MEDICINE

## 2020-11-25 PROCEDURE — 25010000002 PROPOFOL 10 MG/ML EMULSION: Performed by: NURSE ANESTHETIST, CERTIFIED REGISTERED

## 2020-11-25 RX ORDER — SODIUM CHLORIDE 0.9 % (FLUSH) 0.9 %
10 SYRINGE (ML) INJECTION AS NEEDED
Status: CANCELLED | OUTPATIENT
Start: 2020-11-25

## 2020-11-25 RX ORDER — SODIUM CHLORIDE 9 MG/ML
500 INJECTION, SOLUTION INTRAVENOUS CONTINUOUS PRN
Status: DISCONTINUED | OUTPATIENT
Start: 2020-11-25 | End: 2020-11-25 | Stop reason: HOSPADM

## 2020-11-25 RX ORDER — SODIUM CHLORIDE 9 MG/ML
100 INJECTION, SOLUTION INTRAVENOUS CONTINUOUS
Status: CANCELLED | OUTPATIENT
Start: 2020-11-25

## 2020-11-25 RX ORDER — SODIUM CHLORIDE 0.9 % (FLUSH) 0.9 %
10 SYRINGE (ML) INJECTION AS NEEDED
Status: DISCONTINUED | OUTPATIENT
Start: 2020-11-25 | End: 2020-11-25 | Stop reason: HOSPADM

## 2020-11-25 RX ORDER — SODIUM CHLORIDE 0.9 % (FLUSH) 0.9 %
10 SYRINGE (ML) INJECTION EVERY 12 HOURS SCHEDULED
Status: CANCELLED | OUTPATIENT
Start: 2020-11-25

## 2020-11-25 RX ORDER — PROPOFOL 10 MG/ML
VIAL (ML) INTRAVENOUS AS NEEDED
Status: DISCONTINUED | OUTPATIENT
Start: 2020-11-25 | End: 2020-11-25 | Stop reason: SURG

## 2020-11-25 RX ADMIN — LIDOCAINE HYDROCHLORIDE 100 MG: 20 INJECTION, SOLUTION INTRAVENOUS at 08:11

## 2020-11-25 RX ADMIN — SODIUM CHLORIDE 500 ML: 9 INJECTION, SOLUTION INTRAVENOUS at 07:17

## 2020-11-25 RX ADMIN — PROPOFOL 150 MG: 10 INJECTION, EMULSION INTRAVENOUS at 08:11

## 2020-11-25 NOTE — ANESTHESIA POSTPROCEDURE EVALUATION
"Patient: Liset Wright    Procedure Summary     Date: 11/25/20 Room / Location: [facility] ENDOSCOPY 2 / [facility]    Anesthesia Start: 0807 Anesthesia Stop: 0815    Procedure: ESOPHAGOGASTRODUODENOSCOPY WITH ANESTHESIA (N/A ) Diagnosis:       Chest pain, unspecified type      (Chest pain, unspecified type [R07.9])    Surgeon: Grant Kaur DO Provider: Nyla Adams CRNA    Anesthesia Type: MAC ASA Status: 2          Anesthesia Type: MAC    Vitals  No vitals data found for the desired time range.          Post Anesthesia Care and Evaluation    Patient location during evaluation: PHASE II  Patient participation: complete - patient participated  Level of consciousness: awake and alert  Pain score: 0  Pain management: adequate  Airway patency: patent  Anesthetic complications: No anesthetic complications  PONV Status: none  Cardiovascular status: acceptable  Respiratory status: acceptable  Hydration status: acceptable    Comments: Blood pressure 176/58, pulse 60, temperature 97.6 °F (36.4 °C), temperature source Temporal, resp. rate 18, height 162.6 cm (64\"), weight 61.7 kg (136 lb), SpO2 97 %, not currently breastfeeding.    Pt discharged from PACU based on telma score >8      "

## 2020-11-25 NOTE — ANESTHESIA PREPROCEDURE EVALUATION
Anesthesia Evaluation     no history of anesthetic complications:  NPO Solid Status: > 8 hours  NPO Liquid Status: > 8 hours           Airway   Mallampati: I  TM distance: >3 FB  Neck ROM: full  Dental      Pulmonary    (+) asthma,  Cardiovascular   Exercise tolerance: excellent (>7 METS)    (+) hypertension,       Neuro/Psych- negative ROS  GI/Hepatic/Renal/Endo    (+)  GERD,  liver disease (alpha-1 antitrypsin deficiency),     Musculoskeletal     Abdominal    Substance History      OB/GYN negative ob/gyn ROS         Other   arthritis, blood dyscrasia anemia,   history of cancer ([facility])                      Anesthesia Plan    ASA 2     MAC     intravenous induction     Anesthetic plan, all risks, benefits, and alternatives have been provided, discussed and informed consent has been obtained with: patient.    Plan discussed with CRNA.

## 2020-11-27 LAB — UREASE TISS QL: NEGATIVE

## 2020-12-17 ENCOUNTER — TRANSCRIBE ORDERS (OUTPATIENT)
Dept: LAB | Facility: HOSPITAL | Age: 70
End: 2020-12-17

## 2020-12-17 ENCOUNTER — LAB (OUTPATIENT)
Dept: LAB | Facility: HOSPITAL | Age: 70
End: 2020-12-17

## 2020-12-17 DIAGNOSIS — R50.9 FEVER, UNSPECIFIED: ICD-10-CM

## 2020-12-17 DIAGNOSIS — R05.9 COUGH: ICD-10-CM

## 2020-12-17 DIAGNOSIS — Z20.828 CONTACT W AND EXPOSURE TO OTH VIRAL COMMUNICABLE DISEASES: ICD-10-CM

## 2020-12-17 DIAGNOSIS — R06.02 SHORTNESS OF BREATH: ICD-10-CM

## 2020-12-17 DIAGNOSIS — R09.81 NASAL CONGESTION: ICD-10-CM

## 2020-12-17 DIAGNOSIS — R51.9 INTRACTABLE HEADACHE, UNSPECIFIED CHRONICITY PATTERN, UNSPECIFIED HEADACHE TYPE: ICD-10-CM

## 2020-12-17 DIAGNOSIS — J02.9 ACUTE PHARYNGITIS, UNSPECIFIED ETIOLOGY: ICD-10-CM

## 2020-12-17 DIAGNOSIS — R05.9 COUGH: Primary | ICD-10-CM

## 2020-12-17 LAB — SARS-COV-2 RNA PNL SPEC NAA+PROBE: NOT DETECTED

## 2020-12-17 PROCEDURE — 87635 SARS-COV-2 COVID-19 AMP PRB: CPT

## 2020-12-17 PROCEDURE — C9803 HOPD COVID-19 SPEC COLLECT: HCPCS

## 2020-12-20 ENCOUNTER — NURSE TRIAGE (OUTPATIENT)
Dept: CALL CENTER | Facility: HOSPITAL | Age: 70
End: 2020-12-20

## 2020-12-20 NOTE — TELEPHONE ENCOUNTER
"  Reason for Disposition  • [1] Caller requests to speak ONLY to PCP AND [2] URGENT question    Additional Information  • Negative: Lab calling with strep throat test results and triager can call in prescription  • Negative: Lab calling with urinalysis test results and triager can call in prescription  • Negative: Medication questions  • Negative: ED call to PCP  • Negative: Physician call to PCP  • Negative: Call about patient who is currently hospitalized  • Negative: Lab or radiology calling with CRITICAL test results  • Negative: [1] Prescription not at pharmacy AND [2] was prescribed today by PCP  • Negative: [1] Follow-up call from patient regarding patient's clinical status AND [2] information urgent    Answer Assessment - Initial Assessment Questions  1. REASON FOR CALL or QUESTION: \"What is your reason for calling today?\" or \"How can I best  help you?\" or \"What question do you have that I can help answer?\"  Caller spoke with Dr Hastings this am. Dr. Hastings called the patient and spoke with her and told her if she had any changes to call and we would page her, she is on call. Caller says she is high risk.  2. CALLER: Document the source of call. (e.g., laboratory, patient).  patient    Protocols used: PCP CALL - NO TRIAGE-ADULT-    "

## 2020-12-21 ENCOUNTER — LAB (OUTPATIENT)
Dept: LAB | Facility: HOSPITAL | Age: 70
End: 2020-12-21

## 2020-12-21 ENCOUNTER — TRANSCRIBE ORDERS (OUTPATIENT)
Dept: LAB | Facility: HOSPITAL | Age: 70
End: 2020-12-21

## 2020-12-21 DIAGNOSIS — R51.9 INTRACTABLE HEADACHE, UNSPECIFIED CHRONICITY PATTERN, UNSPECIFIED HEADACHE TYPE: ICD-10-CM

## 2020-12-21 DIAGNOSIS — R06.02 SHORTNESS OF BREATH: ICD-10-CM

## 2020-12-21 DIAGNOSIS — J02.9 ACUTE PHARYNGITIS, UNSPECIFIED ETIOLOGY: ICD-10-CM

## 2020-12-21 DIAGNOSIS — R50.9 FEVER, UNSPECIFIED: ICD-10-CM

## 2020-12-21 DIAGNOSIS — Z20.828 CONTACT W AND EXPOSURE TO OTH VIRAL COMMUNICABLE DISEASES: ICD-10-CM

## 2020-12-21 DIAGNOSIS — R05.9 COUGH: ICD-10-CM

## 2020-12-21 DIAGNOSIS — R05.9 COUGH: Primary | ICD-10-CM

## 2020-12-21 LAB — SARS-COV-2 RNA PNL SPEC NAA+PROBE: NOT DETECTED

## 2020-12-21 PROCEDURE — 87635 SARS-COV-2 COVID-19 AMP PRB: CPT

## 2020-12-21 PROCEDURE — C9803 HOPD COVID-19 SPEC COLLECT: HCPCS

## 2021-02-09 ENCOUNTER — LAB (OUTPATIENT)
Dept: LAB | Facility: HOSPITAL | Age: 71
End: 2021-02-09

## 2021-02-09 ENCOUNTER — TRANSCRIBE ORDERS (OUTPATIENT)
Dept: ADMINISTRATIVE | Facility: HOSPITAL | Age: 71
End: 2021-02-09

## 2021-02-09 DIAGNOSIS — R05.9 COUGH: Primary | ICD-10-CM

## 2021-02-09 DIAGNOSIS — R06.02 SHORTNESS OF BREATH: ICD-10-CM

## 2021-02-09 DIAGNOSIS — J02.9 ACUTE PHARYNGITIS, UNSPECIFIED ETIOLOGY: ICD-10-CM

## 2021-02-09 DIAGNOSIS — J06.9 ACUTE RESPIRATORY DISEASE: ICD-10-CM

## 2021-02-09 LAB — SARS-COV-2 ORF1AB RESP QL NAA+PROBE: NOT DETECTED

## 2021-02-09 PROCEDURE — C9803 HOPD COVID-19 SPEC COLLECT: HCPCS | Performed by: PHYSICIAN ASSISTANT

## 2021-02-09 PROCEDURE — U0004 COV-19 TEST NON-CDC HGH THRU: HCPCS | Performed by: PHYSICIAN ASSISTANT

## 2021-02-10 ENCOUNTER — LAB (OUTPATIENT)
Dept: LAB | Facility: HOSPITAL | Age: 71
End: 2021-02-10

## 2021-02-10 DIAGNOSIS — C91.10 CLL (CHRONIC LYMPHOCYTIC LEUKEMIA) (HCC): ICD-10-CM

## 2021-02-10 LAB
ALBUMIN SERPL-MCNC: 4.3 G/DL (ref 3.5–5.2)
ALBUMIN/GLOB SERPL: 1.9 G/DL
ALP SERPL-CCNC: 105 U/L (ref 39–117)
ALT SERPL W P-5'-P-CCNC: 15 U/L (ref 1–33)
ANION GAP SERPL CALCULATED.3IONS-SCNC: 6 MMOL/L (ref 5–15)
AST SERPL-CCNC: 22 U/L (ref 1–32)
B2 MICROGLOB SERPL-MCNC: 1.6 MG/L (ref 0.8–2.2)
BASOPHILS # BLD AUTO: 0.04 10*3/MM3 (ref 0–0.2)
BASOPHILS NFR BLD AUTO: 0.3 % (ref 0–1.5)
BILIRUB SERPL-MCNC: 0.7 MG/DL (ref 0–1.2)
BUN SERPL-MCNC: 10 MG/DL (ref 8–23)
BUN/CREAT SERPL: 14.5 (ref 7–25)
CALCIUM SPEC-SCNC: 9.1 MG/DL (ref 8.6–10.5)
CHLORIDE SERPL-SCNC: 103 MMOL/L (ref 98–107)
CO2 SERPL-SCNC: 30 MMOL/L (ref 22–29)
CREAT SERPL-MCNC: 0.69 MG/DL (ref 0.57–1)
DEPRECATED RDW RBC AUTO: 42.7 FL (ref 37–54)
EOSINOPHIL # BLD AUTO: 0.09 10*3/MM3 (ref 0–0.4)
EOSINOPHIL NFR BLD AUTO: 0.6 % (ref 0.3–6.2)
ERYTHROCYTE [DISTWIDTH] IN BLOOD BY AUTOMATED COUNT: 12.9 % (ref 12.3–15.4)
FERRITIN SERPL-MCNC: 159.3 NG/ML (ref 13–150)
GFR SERPL CREATININE-BSD FRML MDRD: 85 ML/MIN/1.73
GLOBULIN UR ELPH-MCNC: 2.3 GM/DL
GLUCOSE SERPL-MCNC: 116 MG/DL (ref 65–99)
HCT VFR BLD AUTO: 35 % (ref 34–46.6)
HGB BLD-MCNC: 12.1 G/DL (ref 12–15.9)
IGG1 SER-MCNC: 615 MG/DL (ref 700–1600)
IRON 24H UR-MRATE: 67 MCG/DL (ref 37–145)
IRON SATN MFR SERPL: 22 % (ref 20–50)
LDH SERPL-CCNC: 219 U/L (ref 135–214)
LYMPHOCYTES # BLD AUTO: 11.21 10*3/MM3 (ref 0.7–3.1)
LYMPHOCYTES NFR BLD AUTO: 71.5 % (ref 19.6–45.3)
MCH RBC QN AUTO: 31.2 PG (ref 26.6–33)
MCHC RBC AUTO-ENTMCNC: 34.6 G/DL (ref 31.5–35.7)
MCV RBC AUTO: 90.2 FL (ref 79–97)
MONOCYTES # BLD AUTO: 0.47 10*3/MM3 (ref 0.1–0.9)
MONOCYTES NFR BLD AUTO: 3 % (ref 5–12)
NEUTROPHILS NFR BLD AUTO: 24.4 % (ref 42.7–76)
NEUTROPHILS NFR BLD AUTO: 3.84 10*3/MM3 (ref 1.7–7)
PLATELET # BLD AUTO: 213 10*3/MM3 (ref 140–450)
PMV BLD AUTO: 9.6 FL (ref 6–12)
POTASSIUM SERPL-SCNC: 4.2 MMOL/L (ref 3.5–5.2)
PROT SERPL-MCNC: 6.6 G/DL (ref 6–8.5)
RBC # BLD AUTO: 3.88 10*6/MM3 (ref 3.77–5.28)
SMUDGE CELLS BLD QL SMEAR: NORMAL
SODIUM SERPL-SCNC: 139 MMOL/L (ref 136–145)
TIBC SERPL-MCNC: 302 MCG/DL (ref 298–536)
TRANSFERRIN SERPL-MCNC: 203 MG/DL (ref 200–360)
WBC # BLD AUTO: 15.68 10*3/MM3 (ref 3.4–10.8)

## 2021-02-10 PROCEDURE — 36415 COLL VENOUS BLD VENIPUNCTURE: CPT

## 2021-02-10 PROCEDURE — 85007 BL SMEAR W/DIFF WBC COUNT: CPT

## 2021-02-10 PROCEDURE — 83540 ASSAY OF IRON: CPT

## 2021-02-10 PROCEDURE — 82784 ASSAY IGA/IGD/IGG/IGM EACH: CPT

## 2021-02-10 PROCEDURE — 85025 COMPLETE CBC W/AUTO DIFF WBC: CPT

## 2021-02-10 PROCEDURE — 83615 LACTATE (LD) (LDH) ENZYME: CPT

## 2021-02-10 PROCEDURE — 82232 ASSAY OF BETA-2 PROTEIN: CPT

## 2021-02-10 PROCEDURE — 80053 COMPREHEN METABOLIC PANEL: CPT

## 2021-02-10 PROCEDURE — 84466 ASSAY OF TRANSFERRIN: CPT

## 2021-02-10 PROCEDURE — 82728 ASSAY OF FERRITIN: CPT

## 2021-02-19 NOTE — PROGRESS NOTES
[facility]Y  [address]  [facility] [address] [phone]    Patient Name: Liset Wright  Encounter Date: 02/23/2021  YOB: 1950  Patient Number: [id number]     REASON FOR VISIT:  Liset Wright is a 70-year-old female who returns in follow-up of stage 0 chronic lymphocytic leukemia (B-CLL). She is seen 25 months since her initial visit on 01/28/2019. She remains in observation. She is here alone.     DIAGNOSTIC ABNORMALITIES:   1. On 11/28/2018, labs showed a WBC of 13.9 with 78% lymphocytes (ALC 10.9), ANC 2.4, and was otherwise normal. Hemoglobin 12, hematocrit 36.7, MCV 87, platelets 261,000. CMP was normal in its entirety to include a BUN of 11, creatinine 0.78, GFR 79, calcium 9.2, total protein 6.8, and normal liver enzymes.  2. On 01/11/2019, CT of the abdomen and pelvis with IV contrast at [facility] showed no acute pathology in the abdomen or pelvis (no masses or any abnormalities to account for her left upper quadrant pain with no adenopathy and normal spleen).  3. On 01/16/2019, Ainsley-Barr virus titers showed an IgG level of 239 (0 - 17.9), Ainsley-Barr virus nuclear antigen IgG 118 (0 - 17.9), but IgM of less than 36 and early antigen of less than 9 (indicating prior infection).  4. On 01/10/2019, repeat CBC showed a hemoglobin of 11.3, hematocrit 35.5, MCV 91.3, platelets 249,000, WBC 13.29.   5. On 01/14/2019, blood smear (manual) review showed atypical lymphocytosis, moderate smudge cells present. RBC morphology showed normocytic, normochromic anemia without significant morphologic abnormality. Platelets normal morphology.  6. On 01/17/2019, peripheral blood was sent for flow cytometry ([facility]). This revealed chronic lymphocytic leukemia (54% of total cells). Monoclonal B cells have a CLL immunophenotype and are negative for both CD38 and ZAP-70 associated with  standard risk disease. PCR for IGVH and p53 mutation and FISH for CLL may provide additional prognostic information. Virtually all of the B cells are monoclonal and express CD19 (dim), CD20 (dim), CD5, CD23, CD11c, and dim surface kappa light chain. They are negative for CD10, CD38, FMC-7, ZAP-70, and surface lambda light chain.   7. Labs, 01/28/2019: Hemoglobin 13.1, hematocrit 39.5, MCV 90.5, platelets 283,000, WBC 12.2 with 19.3 segs (ANC 2.4), 75.8 lymphocytes (ALC 9.2). CMP normal. GFR 85, calcium 9.9, total protein 7.1, and normal liver enzymes.  (265 - 665). Beta 2 microglobulin 1.3. Serum iron 101, TIBC 333, iron saturation 30%, B12 of 431, folate 15 (each within reference range). Ferritin 23.7 (depressed). HIV screen negative. IgG 742.  8. Bone marrow biopsy/aspirate, 02/01/2019: PERIPHERAL BLOOD: B cell chronic lymphocytic leukemia. BONE MARROW, UNSPECIFIED SITE, SMEARS, CLOT AND BIOPSY: Involved by B cell chronic lymphocytic leukemia (30%). CLL panel: Positive for a deletion of DLEUI, DILEU2 on chromosome 13 at 04 (58,0% of cells), consistent with CLL. Isolated del 1304.3 is associated with a favorable clinical course. Three of the twenty mitotic cells examined were characterized by loss of one copy of the X chromosome and a deletion in the long arm of chromosome 13.   9. Stools OB x 3, 02/25/2019. Negative x 3    PREVIOUS INTERVENTIONS:   1. Observation        Problem List Items Addressed This Visit     None        Oncology/Hematology History    No history exists.       PAST MEDICAL HISTORY:  ALLERGIES:  Allergies   Allergen Reactions   • Levofloxacin Paresthesia   • Phenergan [Promethazine Hcl] Other (See Comments)     Jerky movements   • Altace [Ramipril] Palpitations     CURRENT MEDICATIONS:  Outpatient Encounter Medications as of 2/23/2021   Medication Sig Dispense Refill   • dicyclomine (BENTYL) 10 MG capsule Take 10 mg by mouth 2 (Two) Times a Day.     • esomeprazole (nexIUM) 40 MG capsule  "Take 1 capsule by mouth 2 (Two) Times a Day. (Patient taking differently: Take 40 mg by mouth Daily.) 60 capsule 11   • metoprolol tartrate (LOPRESSOR) 100 MG tablet Take 100 mg by mouth 2 (Two) Times a Day.     • omeprazole (priLOSEC) 40 MG capsule Take 40 mg by mouth Daily.     • valsartan (DIOVAN) 320 MG tablet Take 320 mg by mouth Daily.     • [DISCONTINUED] ferrous sulfate 325 (65 FE) MG tablet As Needed.       No facility-administered encounter medications on file as of 2/23/2021.      ADULT ILLNESSES:   Chronic lymphocytic leukemia ( ICD-10:C91.10 ;Chronic lymphocytic leukemia of B-cell type not having achieved remission   Abdominal pain ( ICD-10:R10.12 ;Left upper quadrant pain   Asthma ( Dr. Johnson; ICD-10:J45.909 ;Unspecified asthma, uncomplicated )   Cervical degenerative disk disease ( x-ray 11/2017; ICD-10:M50.20 ;Other cervical disc displacement, unspecified cervical region )   Erosive gastritis ( Dr. Kaur; ICD-10:K29.60 ;Other gastritis without bleeding )   Gastroesophageal reflux disease ( GERD, Dr. Kaur; ICD-10:K21.9 ;Gastro-esophageal reflux disease without esophagitis )   Gastroparesis ( ICD-10:K31.84 ;Gastroparesis   Hypertension ( ICD-10:I10 ;Essential (primary) hypertension   Irritable bowel syndrome ( ICD-10:K58.9 ;Irritable bowel syndrome without diarrhea   Migraines ( ICD-10:G43.909 ;Migraine, unspecified, not intractable, without status migrainosus   Normocytic anemia ( ICD-10:D64.9 ;Anemia, unspecified   Palpitations ( normal heart cath, 02/2003, bilateral ultrasound showed no significant stenosis, 06/2015, stress echo 05/2018 normal; ICD-10:R00.2 ;Palpitations )   Schatzki esophageal ring ( erosive gastritis/gastroesophageal reflux disease, Dr. Kaur; ICD-10:K22.2 ;Esophageal obstruction )    SURGERIES:   Cholecystectomy   Cardiac catheterization, 02/2003   Colonoscopy, 2017. \"No polyps.\" 5 years. Dr. Kaur   EGD (upper endoscopy), 2018, normal, Dr. Kaur   Hysterectomy, " total      ADULT ILLNESSES:  Patient Active Problem List   Diagnosis Code   • Palpitations R00.2   • PVCs (premature ventricular contractions) I49.3   • Essential hypertension I10   • Epigastric pain R10.13   • CLL (chronic lymphocytic leukemia) (CMS/HCC) C91.10   • Bronchitis J40   • Restrictive lung disease J98.4   • Encounter for screening for malignant neoplasm of colon Z12.11   • Right ventricular dilation I51.7   • Chest pain R07.9     SURGERIES:  Past Surgical History:   Procedure Laterality Date   • CARDIAC CATHETERIZATION     • CHOLECYSTECTOMY     • COLONOSCOPY  10/16/2015    normal   • COLONOSCOPY N/A 10/19/2020    Procedure: COLONOSCOPY WITH ANESTHESIA;  Surgeon: Grant Kaur DO;  Location:  [facility];  Service: Gastroenterology;  Laterality: N/A;  pre: screening  post: normal  Harry Hastings MD   • ENDOSCOPY N/A 11/23/2016    normal   • ENDOSCOPY N/A 12/11/2018    Procedure: ESOPHAGOGASTRODUODENOSCOPY WITH ANESTHESIA;  Surgeon: Grant Kaur DO;  Location:  [facility];  Service: Gastroenterology   • ENDOSCOPY N/A 11/25/2020    Procedure: ESOPHAGOGASTRODUODENOSCOPY WITH ANESTHESIA;  Surgeon: Grant Kaur DO;  Location: [facility]COPY;  Service: Gastroenterology;  Laterality: N/A;  preop; chest pain  postop; normal   PCP Harry Hastings    • HYSTERECTOMY       HEALTH MAINTENANCE ITEMS:  Health Maintenance Due   Topic Date Due   • DXA SCAN  1950   • TDAP/TD VACCINES (1 - Tdap) 09/09/1971   • ZOSTER VACCINE (1 of 2) 09/09/2002   • HEPATITIS C SCREENING  04/26/2017   • LIPID PANEL  08/23/2019   • MAMMOGRAM  11/07/2019   • INFLUENZA VACCINE  08/01/2020   • ANNUAL WELLNESS VISIT  11/01/2020       <no information>  Last Completed Colonoscopy       Status Date      COLONOSCOPY Done 10/19/2020 COLONOSCOPY     Patient has more history with this topic...        Immunization History   Administered Date(s) Administered   • FLUAD TRI 65YR+ 10/06/2017, 10/09/2018   • Flu Vaccine Quad PF  ">18YRS 10/01/2018   • Flulaval/Fluarix/Fluzone Quad 11/01/2019   • Hep B, Unspecified 01/10/2002, 02/15/2002, 05/16/2002   • Influenza Quad Vaccine (Inpatient) 10/01/2018   • Pneumococcal Conjugate 13-Valent (PCV13) 10/06/2017   • Pneumococcal Polysaccharide (PPSV23) 10/01/2018     Last Completed Mammogram       Status Date      MAMMOGRAM Done 11/7/2017 Ext Proc: [place] Scr mammo bi incl cad     Patient has more history with this topic...            FAMILY HISTORY:  Family History   Problem Relation Age of Onset   • Cancer Mother    • Cancer Father         lung   • Cancer Paternal Grandmother         stomach   • No Known Problems Brother    • No Known Problems Maternal Aunt    • No Known Problems Maternal Uncle    • No Known Problems Paternal Aunt    • No Known Problems Paternal Uncle    • No Known Problems Maternal Grandmother    • No Known Problems Maternal Grandfather    • No Known Problems Paternal Grandfather    • No Known Problems Other    • Colon cancer Neg Hx    • Esophageal cancer Neg Hx    • Asthma Neg Hx    • Diabetes Neg Hx    • Emphysema Neg Hx    • Heart failure Neg Hx    • Hypertension Neg Hx    • Colon polyps Neg Hx      SOCIAL HISTORY:  Social History     Socioeconomic History   • Marital status:      Spouse name: Not on file   • Number of children: Not on file   • Years of education: Not on file   • Highest education level: Not on file   Tobacco Use   • Smoking status: Never Smoker   • Smokeless tobacco: Never Used   Substance and Sexual Activity   • Alcohol use: No   • Drug use: No   • Sexual activity: Defer       REVIEW OF SYSTEMS:  Constitutional:   The patient's appetite is good but energy is chronically low. \"Oh yeah.\" She manages her ADLs including chores, errands.  She still drives.  She has gained 3 pounds (in addition to 5 pounds at her prior visit) since her last visit. She has no fevers, chills, or drenching night sweats. Her sleep habits seem appropriate.  Ear/Nose/Throat/Mouth: " "  She reports no ear pains, sinus symptoms, sore throat, nosebleeds, or sore tongue. She has migraine headaches. She denies any hoarseness, change in voice quality, or hemoptysis.   Ocular:   She reports no eye pain, significant change in visual acuity, double vision, or blurry vision.  Respiratory:   Says she is prone to respiratory infections due to alpha 1 antitrypsin deficiency.  She has some exertional dyspnea from possible asthma but no chronic cough, significant shortness of breathing at rest, phlegm production, or unexplained chest wall pain. Says prior PFTs suggest \"maybe asthma\"   Cardiovascular:   She reports no exertional chest pain, chest pressure, or chest heaviness. She reports no claudication. She reports occasional palpitations but denies symptomatic orthostasis.  Gastrointestinal:   She reports no dysphagia, nausea, vomiting, postprandial abdominal pain, bloating, cramping, change in bowel habits, with dark (OTC iron) discoloration of the stool. She reports no rectal bleeding. She reports occasional but chronic constipation requiring stool softeners, lifelong. She has no diarrhea.  Genitourinary:   She reports no urinary burning, frequency, dribbling, or discoloration. She reports no difficulty controlling her bladder. She has no need to urinate frequently through the night.   Musculoskeletal:   She reports no unexplained arthralgias, myalgias, or nighttime leg cramping.  Extremities:   She reports no trouble with fluid retention or significant leg swelling.  Endocrine:   She reports no problems with excess thirst, excessive urination, vasomotor instability, or unexplained fatigue.  Heme/Lymphatic:   She reports no unexplained bleeding, bruising, petechial rashes, or swollen glands.  Skin:   She reports no itching, rashes, or lesions which won't heal.  Neuro:   She reports no loss of consciousness, seizures, fainting spells, or dizziness. She reports no weakness of face, arms, or legs. She has no " "difficulty with speech. She has no tremors. She admits to occasional paresthesias of the hands.   Psych:   She seems generally satisfied with life. She denies depression. She reports no mood swings.         VITAL SIGNS: /88   Pulse 55   Temp 97.6 °F (36.4 °C)   Resp 16   Ht 162.6 cm (64\")   Wt 62.7 kg (138 lb 3.2 oz)   SpO2 99%   Breastfeeding No   BMI 23.72 kg/m² Body surface area is 1.67 meters squared.  Pain Score    02/23/21 1004   PainSc: 0-No pain         PHYSICAL EXAMINATION:   General:   She is a pleasant, cooperative, slender but otherwise well-developed, well-nourished, and modestly-kept elderly female who is comfortable at rest. She arrived in the exam room ambulatory. She appears to be her stated age. Her skin color is normal. ECOG 0  Head/Neck:   The patient is anicteric and atraumatic. She is wearing a surgical mask today. The trachea is midline. The neck is supple without evidence of jugular venous distention or cervical adenopathy. Prominent, non-tender right sternoclavicular joint.  Eyes:   The pupils are equal, round, and reactive to light. The extraocular movements are full. There is no scleral jaundice or erythema.   Chest:   The respiratory efforts are normal and unhindered. The chest is clear to auscultation and percussion. There are no wheezes, rhonchi, rales, or asymmetry of breath sounds.  Cardiovascular:   The patient has a regular cardiac rate and rhythm without murmurs, rubs, or gallops. The peripheral pulses are equal and full.  Abdomen:   The belly is soft and flat. There is no rebound or guarding. There is no organomegaly, mass-effect, or tenderness. Bowel sounds are active and of normal character.  Extremities:   There is no evidence of cyanosis, clubbing, or edema.  Rheumatologic:   There is no overt evidence of rheumatoid deformities of the hands. There is no sausaging of the fingers. There is no sign of active synovitis. The gait is normal.  Cutaneous:   There are no " overt rashes, disseminated lesions, purpura, or petechiae.   Lymphatics:   There is no evidence of adenopathy in the cervical, supraclavicular, axillary, inguinal, or femoral areas. There is no splenomegaly.  Neurologic:   The patient is alert, oriented, cooperative, and pleasant. She is appropriately conversant. She ambulated into the exam room without assistance and transferred from chair to exam table unaided. There is no overt dysfunction of the motor, sensory, cerebellar systems.  Psych:   Mood and affect are appropriate for circumstance. Eye contact is appropriate. Normal judgement and decision making.         LABS    Lab Results - Last 18 Months   Lab Units 02/10/21  0856 08/12/20  0922 02/27/20  0833 11/25/19  1310   HEMOGLOBIN g/dL 12.1 12.4 13.0 11.4*   HEMATOCRIT % 35.0 37.7 39.4 34.5   MCV fL 90.2 92.2 90.8 92.0   WBC 10*3/mm3 15.68* 14.59* 15.80* 23.99*   RDW % 12.9 12.5 13.0 13.0   MPV fL 9.6 9.5 9.3 9.2   PLATELETS 10*3/mm3 213 227 236 264   NEUTROS ABS 10*3/mm3 3.84 3.60 3.99 13.09*   LYMPHS ABS 10*3/mm3 11.21*  --  11.29*  --    MONOS ABS 10*3/mm3 0.47  --  0.37  --    EOS ABS 10*3/mm3 0.09 0.45* 0.10  --    BASOS ABS 10*3/mm3 0.04  --  0.03  --    NEUTROPHIL % %  --  24.7*  --  49.5   MONOCYTES % %  --  4.1*  --  5.1   ATYP LYMPH % %  --  4.1  --  4.0       Lab Results - Last 18 Months   Lab Units 02/10/21  0856 08/12/20  0922 08/04/20  0723 02/27/20  0833 11/25/19  1310   GLUCOSE mg/dL 116* 112*  --  123* 127*   SODIUM mmol/L 139 138  --  140 138   POTASSIUM mmol/L 4.2 4.5  --  4.4 3.5   CO2 mmol/L 30.0* 30.0*  --  28.0 29.0   CHLORIDE mmol/L 103 100  --  102 98   ANION GAP mmol/L 6.0 8.0  --  10.0 11.0   CREATININE mg/dL 0.69 0.69 1.00 0.74 0.69   BUN mg/dL 10 11  --  13 13   BUN / CREAT RATIO  14.5 15.9  --  17.6 18.8   CALCIUM mg/dL 9.1 9.3  --  9.3 9.1   EGFR IF NONAFRICN AM mL/min/1.73 85 85  --  78 85   ALK PHOS U/L 105 85  --  101 158*   TOTAL PROTEIN g/dL 6.6 6.5  --  7.0 7.3   ALT (SGPT)  U/L 15 19  --  13 56*   AST (SGOT) U/L 22 22  --  19 74*   BILIRUBIN mg/dL 0.7 0.7  --  0.8 0.7   ALBUMIN g/dL 4.30 4.50  --  4.60 4.20   GLOBULIN gm/dL 2.3 2.0  --  2.4 3.1       Lab Results - Last 18 Months   Lab Units 02/10/21  0856 20  0922 20  0833   LDH U/L 219* 215* 213       Lab Results - Last 18 Months   Lab Units 02/10/21  0856 20  0922 20  0833   IRON mcg/dL 67  --  86   TIBC mcg/dL 302  --  323   IRON SATURATION % 22  --  27   FERRITIN ng/mL 159.30* 145.40 131.50       ASSESSMENT:   1. B cell chronic lymphocytic leukemia (B-CLL):   Stage: 0   Tumor [place]: Lymphocytosis.   Complications of Tumor: None.   Tumor Status: Untreated.   IVGH and p53 mutations: Pending.   CD38: Negative.   ZAP-70: Negative.   Isolated del 13q14.3 (favorable)   LDH: 219, 02/10/2021 (prior: 213 - 599)   B2M: 1.6, 02/10/2021 (prior: 1.3 - 2.1)   Ig, 02/10/2021 (prior: 666 - 742)  - WBC 15.68; ALC 11.21, 02/10/2021  (range: WBC 13.9-23.99; ALC 8.64-13.83)  2. Normocytic anemia.  Repleted ferritin -159, 02/10/2021 (from 145.4, 2020 (from 131.5; from 53.2; from 35; from 23). Improved, Hgb 12.1; MCV 90.1, 02/10/2021 (prior: Hgb 11.3 - 13.3; MCV 87 - 92.2)   3. Irritable bowel syndrome. On Bentyl  4. Gastroparesis. On omeprazole  5. Migraines. On Excedrin migraines  6. Cervical degenerative disk disease.  No meds        7.  Alpha 1 antitrypsin deficiency. Dr. Johnson        8.  Palpitations with echo, 2020 showing grade II diastolic dysfunction but EF > 70%.  Dr. Hawk follows        9.  Prominent right sterno-clavicular joint. Asymptomatic       10. Received COVID # 2 vaccination, 2021    RECOMMENDATIONS:   1.  Apprised of labs from 02/10/2021, Stable lymphocytosis, normal Hgb, normal platelets, repleted ferritin (> 100) but otherwise repleted iron levels, normal B2M, normal LDH, stable IgG (> 500), normal CMP.  2.  Previously discussed the labs from  and 2019 stable  "low grade leukocytosis and lymphocytosis (greater than 5000) normal Hgb and normal platelets, normal CMP, normal LDH, normal B2M, repleted serum iron, repleted (> 20%) fe sat, low (< 100) ferritin, repleted B12/folate, negative HIV screen, IgG > 500.         3.  Apprised of right sternoclavicular joint x-ray, 09/02/2020.  Mild arthritic changes of the inferior proximal right clavicle adjacent to the sternoclavicular joint with appropriate alignment.  4.  Previously discussed the bone marrow biopsy, 02/01 (above). Confirms B-CLL with del 13q14 (favorable)   5.  B- CLL again previously discussed. I had explained that standard therapy has not appreciably altered the nature history of CLL and survival curves demonstrate that CLL is an incurable disease. Randomized studies have failed to show a survival advantage of immediate treatment over delayed treatment for asymptomatic patients with early stage disease. As a result, treatment is generally reserved until the patient has active disease defined as the presence of disease-related symptoms: Weight loss greater than 10%; extreme fatigue; fever greater than 100.5 for 2 weeks with night sweats without evidence of infection (\"B\" symptoms); worsening cytopenias (HGB less than 11; platelets less than 100,000); unexplained recurrent infections; worsening adenopathy, or splenomegaly. She is aware to call should she develop any of these symptoms.    6.  Continue ongoing management per primary care.   7.  Return to the [place] office in 24 weeks with pre-office serum iron, Fe sat, ferritin, CBC and differential, IgG, LDH, B2M.    QUALITY MEASURES:   MEDICAL DECISION MAKING: Low Complexity   AMOUNT OF DATA: Limited  RISK OF COMPLICATIONS: Low     I spent 15 total minutes, face-to-face, caring for Liset today.  Greater than 50% of this time involved counseling and/or coordination of care as documented within this note regarding the patient's illness(es), pros and cons of various " treatment options, instructions and/or risk reduction.    cc: Harry Hastings MD

## 2021-02-23 ENCOUNTER — OFFICE VISIT (OUTPATIENT)
Dept: ONCOLOGY | Facility: CLINIC | Age: 71
End: 2021-02-23

## 2021-02-23 VITALS
HEIGHT: 64 IN | WEIGHT: 138.2 LBS | OXYGEN SATURATION: 99 % | BODY MASS INDEX: 23.6 KG/M2 | HEART RATE: 55 BPM | TEMPERATURE: 97.6 F | DIASTOLIC BLOOD PRESSURE: 88 MMHG | SYSTOLIC BLOOD PRESSURE: 148 MMHG | RESPIRATION RATE: 16 BRPM

## 2021-02-23 DIAGNOSIS — C91.10 CLL (CHRONIC LYMPHOCYTIC LEUKEMIA) (HCC): Primary | ICD-10-CM

## 2021-02-23 PROCEDURE — 99213 OFFICE O/P EST LOW 20 MIN: CPT | Performed by: INTERNAL MEDICINE

## 2021-04-12 ENCOUNTER — HOSPITAL ENCOUNTER (EMERGENCY)
Facility: HOSPITAL | Age: 71
Discharge: ANOTHER HOSPITAL | End: 2021-04-13
Attending: EMERGENCY MEDICINE | Admitting: EMERGENCY MEDICINE

## 2021-04-12 ENCOUNTER — APPOINTMENT (OUTPATIENT)
Dept: GENERAL RADIOLOGY | Facility: HOSPITAL | Age: 71
End: 2021-04-12

## 2021-04-12 ENCOUNTER — APPOINTMENT (OUTPATIENT)
Dept: CT IMAGING | Facility: HOSPITAL | Age: 71
End: 2021-04-12

## 2021-04-12 DIAGNOSIS — N94.89 PELVIC HEMATOMA, FEMALE: ICD-10-CM

## 2021-04-12 DIAGNOSIS — S32.9XXA CLOSED DISPLACED FRACTURE OF PELVIS, UNSPECIFIED PART OF PELVIS, INITIAL ENCOUNTER (HCC): Primary | ICD-10-CM

## 2021-04-12 LAB
ABO GROUP BLD: NORMAL
ANION GAP SERPL CALCULATED.3IONS-SCNC: 7 MMOL/L (ref 5–15)
APTT PPP: 29.2 SECONDS (ref 24.1–35)
BACTERIA UR QL AUTO: ABNORMAL /HPF
BILIRUB UR QL STRIP: NEGATIVE
BLD GP AB SCN SERPL QL: NEGATIVE
BUN SERPL-MCNC: 11 MG/DL (ref 8–23)
BUN/CREAT SERPL: 18.3 (ref 7–25)
CALCIUM SPEC-SCNC: 9.3 MG/DL (ref 8.6–10.5)
CHLORIDE SERPL-SCNC: 101 MMOL/L (ref 98–107)
CLARITY UR: CLEAR
CO2 SERPL-SCNC: 29 MMOL/L (ref 22–29)
COLOR UR: YELLOW
CREAT SERPL-MCNC: 0.6 MG/DL (ref 0.57–1)
DEPRECATED RDW RBC AUTO: 42.5 FL (ref 37–54)
ERYTHROCYTE [DISTWIDTH] IN BLOOD BY AUTOMATED COUNT: 12.9 % (ref 12.3–15.4)
GFR SERPL CREATININE-BSD FRML MDRD: 99 ML/MIN/1.73
GLUCOSE SERPL-MCNC: 126 MG/DL (ref 65–99)
GLUCOSE UR STRIP-MCNC: NEGATIVE MG/DL
HCT VFR BLD AUTO: 34.4 % (ref 34–46.6)
HGB BLD-MCNC: 11.4 G/DL (ref 12–15.9)
HGB UR QL STRIP.AUTO: ABNORMAL
HYALINE CASTS UR QL AUTO: ABNORMAL /LPF
INR PPP: 1.13 (ref 0.91–1.09)
KETONES UR QL STRIP: ABNORMAL
LEUKOCYTE ESTERASE UR QL STRIP.AUTO: ABNORMAL
LYMPHOCYTES # BLD MANUAL: 11.7 10*3/MM3 (ref 0.7–3.1)
LYMPHOCYTES NFR BLD MANUAL: 53.5 % (ref 19.6–45.3)
MCH RBC QN AUTO: 29.8 PG (ref 26.6–33)
MCHC RBC AUTO-ENTMCNC: 33.1 G/DL (ref 31.5–35.7)
MCV RBC AUTO: 90.1 FL (ref 79–97)
NEUTROPHILS # BLD AUTO: 9.41 10*3/MM3 (ref 1.7–7)
NEUTROPHILS NFR BLD MANUAL: 44.6 % (ref 42.7–76)
NITRITE UR QL STRIP: NEGATIVE
OVALOCYTES BLD QL SMEAR: ABNORMAL
PH UR STRIP.AUTO: 7.5 [PH] (ref 5–8)
PLAT MORPH BLD: NORMAL
PLATELET # BLD AUTO: 204 10*3/MM3 (ref 140–450)
PMV BLD AUTO: 9.6 FL (ref 6–12)
POIKILOCYTOSIS BLD QL SMEAR: ABNORMAL
POTASSIUM SERPL-SCNC: 3.7 MMOL/L (ref 3.5–5.2)
PROT UR QL STRIP: ABNORMAL
PROTHROMBIN TIME: 14.1 SECONDS (ref 11.9–14.6)
RBC # BLD AUTO: 3.82 10*6/MM3 (ref 3.77–5.28)
RBC # UR: ABNORMAL /HPF
REF LAB TEST METHOD: ABNORMAL
RH BLD: POSITIVE
SARS-COV-2 RNA PNL SPEC NAA+PROBE: NOT DETECTED
SODIUM SERPL-SCNC: 137 MMOL/L (ref 136–145)
SP GR UR STRIP: 1.02 (ref 1–1.03)
SQUAMOUS #/AREA URNS HPF: ABNORMAL /HPF
STOMATOCYTES BLD QL SMEAR: ABNORMAL
T&S EXPIRATION DATE: NORMAL
UROBILINOGEN UR QL STRIP: ABNORMAL
VARIANT LYMPHS NFR BLD MANUAL: 2 % (ref 0–5)
WBC # BLD AUTO: 21.09 10*3/MM3 (ref 3.4–10.8)
WBC MORPH BLD: NORMAL
WBC UR QL AUTO: ABNORMAL /HPF

## 2021-04-12 PROCEDURE — 85025 COMPLETE CBC W/AUTO DIFF WBC: CPT | Performed by: EMERGENCY MEDICINE

## 2021-04-12 PROCEDURE — 72192 CT PELVIS W/O DYE: CPT

## 2021-04-12 PROCEDURE — 87635 SARS-COV-2 COVID-19 AMP PRB: CPT | Performed by: EMERGENCY MEDICINE

## 2021-04-12 PROCEDURE — 86901 BLOOD TYPING SEROLOGIC RH(D): CPT | Performed by: EMERGENCY MEDICINE

## 2021-04-12 PROCEDURE — 25010000002 MORPHINE PER 10 MG: Performed by: EMERGENCY MEDICINE

## 2021-04-12 PROCEDURE — 86900 BLOOD TYPING SEROLOGIC ABO: CPT | Performed by: EMERGENCY MEDICINE

## 2021-04-12 PROCEDURE — 85610 PROTHROMBIN TIME: CPT | Performed by: EMERGENCY MEDICINE

## 2021-04-12 PROCEDURE — 72170 X-RAY EXAM OF PELVIS: CPT

## 2021-04-12 PROCEDURE — 85730 THROMBOPLASTIN TIME PARTIAL: CPT | Performed by: EMERGENCY MEDICINE

## 2021-04-12 PROCEDURE — 81001 URINALYSIS AUTO W/SCOPE: CPT | Performed by: EMERGENCY MEDICINE

## 2021-04-12 PROCEDURE — 63710000001 ONDANSETRON ODT 4 MG TABLET DISPERSIBLE: Performed by: EMERGENCY MEDICINE

## 2021-04-12 PROCEDURE — 72131 CT LUMBAR SPINE W/O DYE: CPT

## 2021-04-12 PROCEDURE — 96372 THER/PROPH/DIAG INJ SC/IM: CPT

## 2021-04-12 PROCEDURE — 86850 RBC ANTIBODY SCREEN: CPT | Performed by: EMERGENCY MEDICINE

## 2021-04-12 PROCEDURE — 85007 BL SMEAR W/DIFF WBC COUNT: CPT | Performed by: EMERGENCY MEDICINE

## 2021-04-12 PROCEDURE — 99284 EMERGENCY DEPT VISIT MOD MDM: CPT

## 2021-04-12 PROCEDURE — 80048 BASIC METABOLIC PNL TOTAL CA: CPT | Performed by: EMERGENCY MEDICINE

## 2021-04-12 RX ORDER — ONDANSETRON 4 MG/1
4 TABLET, ORALLY DISINTEGRATING ORAL ONCE
Status: COMPLETED | OUTPATIENT
Start: 2021-04-12 | End: 2021-04-12

## 2021-04-12 RX ORDER — MORPHINE SULFATE 10 MG/ML
6 INJECTION INTRAMUSCULAR; INTRAVENOUS; SUBCUTANEOUS ONCE
Status: COMPLETED | OUTPATIENT
Start: 2021-04-12 | End: 2021-04-12

## 2021-04-12 RX ADMIN — ONDANSETRON 4 MG: 4 TABLET, ORALLY DISINTEGRATING ORAL at 20:38

## 2021-04-12 RX ADMIN — MORPHINE SULFATE 6 MG: 10 INJECTION, SOLUTION INTRAMUSCULAR; INTRAVENOUS at 20:37

## 2021-04-13 VITALS
BODY MASS INDEX: 20.57 KG/M2 | WEIGHT: 128 LBS | DIASTOLIC BLOOD PRESSURE: 57 MMHG | RESPIRATION RATE: 16 BRPM | HEART RATE: 71 BPM | TEMPERATURE: 97.9 F | HEIGHT: 66 IN | SYSTOLIC BLOOD PRESSURE: 136 MMHG | OXYGEN SATURATION: 98 %

## 2021-04-13 PROCEDURE — 96374 THER/PROPH/DIAG INJ IV PUSH: CPT

## 2021-04-13 PROCEDURE — 25010000002 MORPHINE PER 10 MG: Performed by: EMERGENCY MEDICINE

## 2021-04-13 RX ADMIN — MORPHINE SULFATE 4 MG: 4 INJECTION, SOLUTION INTRAMUSCULAR; INTRAVENOUS at 00:56

## 2021-04-13 NOTE — ED PROVIDER NOTES
Subjective   71 y/o female arrives for evaluation of lower back and pelvic pain worse with movement and sitting but improved with standing. She states this occurred when she was sitting on bleachers and got off to answer a phone call not realizing she was 6 feet in the air, falling to her left side. She denies any LOC, weakness, numbness or tingling, loss of sensation, head trauma or anticoagulation usage. She denies any upper extremity pain, abdominal pain, flank or back pain, chest pain or other issues. She denies previous hip fractures or surgeries.           Review of Systems   All other systems reviewed and are negative.      Past Medical History:   Diagnosis Date   • Acid reflux    • Alpha-1-antitrypsin deficiency (CMS/HCC)    • Arrhythmia    • Arthritis    • Asthma    • Cancer (CMS/HCC)     leukemia   • Hypertension    • Palpitations        Allergies   Allergen Reactions   • Levofloxacin Paresthesia   • Phenergan [Promethazine Hcl] Other (See Comments)     Jerky movements   • Altace [Ramipril] Palpitations       Past Surgical History:   Procedure Laterality Date   • CARDIAC CATHETERIZATION     • CHOLECYSTECTOMY     • COLONOSCOPY  10/16/2015    normal   • COLONOSCOPY N/A 10/19/2020    Procedure: COLONOSCOPY WITH ANESTHESIA;  Surgeon: Grant Kaur DO;  Location: [facility] ENDOSCOPY;  Service: Gastroenterology;  Laterality: N/A;  pre: screening  post: normal  Harry Hastings MD   • ENDOSCOPY N/A 11/23/2016    normal   • ENDOSCOPY N/A 12/11/2018    Procedure: ESOPHAGOGASTRODUODENOSCOPY WITH ANESTHESIA;  Surgeon: Grant Kaur DO;  Location: [facility] ENDOSCOPY;  Service: Gastroenterology   • ENDOSCOPY N/A 11/25/2020    Procedure: ESOPHAGOGASTRODUODENOSCOPY WITH ANESTHESIA;  Surgeon: Grant Kaur DO;  Location: [facility] ENDOSCOPY;  Service: Gastroenterology;  Laterality: N/A;  preop; chest pain  postop; normal   PCP Harry Hastings    • HYSTERECTOMY         Family History   Problem Relation Age of Onset   •  Cancer Mother    • Cancer Father         lung   • Cancer Paternal Grandmother         stomach   • No Known Problems Brother    • No Known Problems Maternal Aunt    • No Known Problems Maternal Uncle    • No Known Problems Paternal Aunt    • No Known Problems Paternal Uncle    • No Known Problems Maternal Grandmother    • No Known Problems Maternal Grandfather    • No Known Problems Paternal Grandfather    • No Known Problems Other    • Colon cancer Neg Hx    • Esophageal cancer Neg Hx    • Asthma Neg Hx    • Diabetes Neg Hx    • Emphysema Neg Hx    • Heart failure Neg Hx    • Hypertension Neg Hx    • Colon polyps Neg Hx        Social History     Socioeconomic History   • Marital status:      Spouse name: Not on file   • Number of children: Not on file   • Years of education: Not on file   • Highest education level: Not on file   Tobacco Use   • Smoking status: Never Smoker   • Smokeless tobacco: Never Used   Substance and Sexual Activity   • Alcohol use: No   • Drug use: No   • Sexual activity: Defer           Objective   Physical Exam  Vitals and nursing note reviewed.   Constitutional:       Appearance: Normal appearance. She is normal weight.   HENT:      Head: Normocephalic and atraumatic.      Right Ear: External ear normal.      Left Ear: External ear normal.      Nose: Nose normal.      Mouth/Throat:      Mouth: Mucous membranes are moist.      Pharynx: Oropharynx is clear.   Eyes:      Extraocular Movements: Extraocular movements intact.      Conjunctiva/sclera: Conjunctivae normal.      Pupils: Pupils are equal, round, and reactive to light.   Cardiovascular:      Rate and Rhythm: Normal rate and regular rhythm.      Pulses: Normal pulses.      Heart sounds: Normal heart sounds.   Pulmonary:      Effort: Pulmonary effort is normal.      Breath sounds: Normal breath sounds.   Chest:      Chest wall: No tenderness.   Abdominal:      General: Abdomen is flat. Bowel sounds are normal. There is no  distension.      Palpations: There is no mass.      Tenderness: There is no abdominal tenderness. There is no guarding or rebound.      Hernia: No hernia is present.   Musculoskeletal:         General: Tenderness present. No swelling, deformity or signs of injury.      Right shoulder: Normal.      Left shoulder: Normal.      Right upper arm: Normal.      Left upper arm: Normal.      Right forearm: Normal.      Left forearm: Normal.      Right wrist: Normal.      Left wrist: Normal.      Cervical back: Normal and normal range of motion. No swelling or edema.      Thoracic back: Normal. No swelling or edema.      Lumbar back: Spasms and tenderness present. Decreased range of motion.      Right upper leg: Normal.      Left upper leg: Normal.      Right knee: Normal.      Left knee: Normal.      Right lower leg: Normal.      Left lower leg: Normal.        Legs:       Comments: Tenderness along the entire lower back bilaterally mostly on the paraspinal regions, no obvious midline tenderness or step offs. She has bilateral pain to her hip mostly on the right near the ing. Crease. She is able to lift both legs from the bed but notes it is easier on her left than right    Distally pulses and sensation are intact, no obvious deformities, shortening or rotation are noted.        Skin:     General: Skin is warm.      Capillary Refill: Capillary refill takes less than 2 seconds.   Neurological:      General: No focal deficit present.      Mental Status: She is alert and oriented to person, place, and time.      Cranial Nerves: No cranial nerve deficit.      Sensory: No sensory deficit.      Motor: No weakness.      Coordination: Coordination normal.   Psychiatric:         Mood and Affect: Mood normal.         Behavior: Behavior normal.         Thought Content: Thought content normal.         Procedures           ED Course    ADD: transfer was requested by surgery (trauma) for possible IR procedure.     We reached out again to  ground who did take the patient.       ED Course as of Apr 14 0331   Mon Apr 12, 2021   2202 WBC is generally not below 92466 for her     []   2244 Given large hematoma I am going to run this by trauma.     []   Tue Apr 13, 2021   0002 Dr. Costello did call back and is asking for transfer to a trauma center.     []   0009 [facility]. This was the patient's request     []   0009 Dr. Vikash Aguilar    []   0038 [facility] has declined ground asking for air.     []      ED Course User Index  [] Griffin Wiseman MD            CT Lumbar Spine Without Contrast   Final Result   Impression:    1. There is an acute fracture of the left sacral wing. The SI joints are   intact. No additional fractures are present.   2. Advanced degenerative disc disease L2-L3 and L3-L4 with narrowing of   the disc space height and endplate spurring. This contributes to central   and foraminal stenosis.   3. Central and foraminal narrowing at other lumbar levels as described   above.           This report was finalized on 04/12/2021 22:23 by Dr. Chau Ingram MD.      CT Pelvis Without Contrast   Final Result   1.. Comminuted fracture of the right superior pubic ramus just proximal   to the pubic symphysis as well as a fracture involving the right   inferior pubic ramus near the pubic symphysis and within its mid shaft.   There is a rather extensive associated pelvic hematoma with some mass   effect on the base of the bladder. There is also a component of the   hematoma extending posteriorly on the right near the piriformis muscle   and in proximity to the right sciatic nerve/sacral plexus. I spoke with   Dr. Wiseman in the emergency room at 9:12 PM concerning the findings of   a pelvic hematoma as well as the pubic rami fractures.   This report was finalized on 04/12/2021 21:13 by Dr. Chau Ingram MD.      XR Pelvis 1 or 2 View   Final Result   1.. Acute fractures of the right superior and  inferior pubic rami.   This report was finalized on 04/12/2021 20:54 by Dr. Chau Ingram MD.        Labs Reviewed   URINALYSIS W/ CULTURE IF INDICATED - Abnormal; Notable for the following components:       Result Value    Ketones, UA 15 mg/dL (1+) (*)     Blood, UA Trace (*)     Protein, UA Trace (*)     Leuk Esterase, UA Small (1+) (*)     All other components within normal limits   URINALYSIS, MICROSCOPIC ONLY - Abnormal; Notable for the following components:    RBC, UA 6-12 (*)     WBC, UA 3-5 (*)     Bacteria, UA Trace (*)     All other components within normal limits   BASIC METABOLIC PANEL - Abnormal; Notable for the following components:    Glucose 126 (*)     All other components within normal limits    Narrative:     GFR Normal >60  Chronic Kidney Disease <60  Kidney Failure <15     PROTIME-INR - Abnormal; Notable for the following components:    INR 1.13 (*)     All other components within normal limits   CBC WITH AUTO DIFFERENTIAL - Abnormal; Notable for the following components:    WBC 21.09 (*)     Hemoglobin 11.4 (*)     All other components within normal limits    Narrative:     The previously reported component NRBC is no longer being reported. Previous result was 0.0 /100 WBC (Reference Range: 0.0-0.2 /100 WBC) on 4/12/2021 at 2200 CDT.   MANUAL DIFFERENTIAL - Abnormal; Notable for the following components:    Lymphocyte % 53.5 (*)     Neutrophils Absolute 9.41 (*)     Lymphocytes Absolute 11.70 (*)     All other components within normal limits   COVID-19,[facility],NASAL SWAB NO TRANSPORT MEDIA 2 HR TAT - Normal    Narrative:     Fact sheet for providers: https://www.fda.gov/media/917341/download     Fact sheet for patients: https://www.fda.gov/media/813836/download    Test performed by PCR.    Consider negative results in combination with clinical observations, patient history, and epidemiological information.   APTT - Normal   COVID PRE-OP / PRE-PROCEDURE SCREENING ORDER (NO  ISOLATION)    Narrative:     The following orders were created for panel order COVID PRE-OP / PRE-PROCEDURE SCREENING ORDER (NO ISOLATION) - Swab, Nasal Cavity.  Procedure                               Abnormality         Status                     ---------                               -----------         ------                     COVID-19,Elda Cabezas [facility]...[[id number]]  Normal              Final result                 Please view results for these tests on the individual orders.   TYPE AND SCREEN   CBC AND DIFFERENTIAL    Narrative:     The following orders were created for panel order CBC & Differential.  Procedure                               Abnormality         Status                     ---------                               -----------         ------                     CBC Auto Differential[[id number]]        Abnormal            Final result                 Please view results for these tests on the individual orders.                                         MDM    Final diagnoses:   Closed displaced fracture of pelvis, unspecified part of pelvis, initial encounter (CMS/[facility])   Pelvic hematoma, female       ED Disposition  ED Disposition     ED Disposition Condition Comment    Transfer to Another Facility             No follow-up provider specified.       Medication List      No changes were made to your prescriptions during this visit.          Griffin Wiseman MD  04/13/21 0003       Griffin Wiseman MD  04/13/21 0010       Griffin Wiseman MD  04/13/21 0038       Griffin Wiseman MD  04/14/21 0331

## 2021-04-13 NOTE — ED NOTES
Phone report given to ANTONIA Rizo at [facility] emergency room.      Hugo Sung RN  04/13/21 0037

## 2021-05-12 ENCOUNTER — TRANSCRIBE ORDERS (OUTPATIENT)
Dept: ADMINISTRATIVE | Facility: HOSPITAL | Age: 71
End: 2021-05-12

## 2021-05-12 DIAGNOSIS — M25.551 RIGHT HIP PAIN: Primary | ICD-10-CM

## 2021-05-12 DIAGNOSIS — M25.551 PAIN IN RIGHT HIP: Primary | ICD-10-CM

## 2021-05-12 DIAGNOSIS — M51.36 OTHER INTERVERTEBRAL DISC DEGENERATION, LUMBAR REGION: ICD-10-CM

## 2021-05-18 ENCOUNTER — HOSPITAL ENCOUNTER (OUTPATIENT)
Dept: CT IMAGING | Facility: HOSPITAL | Age: 71
Discharge: HOME | End: 2021-05-18
Admitting: INTERNAL MEDICINE

## 2021-05-18 DIAGNOSIS — M25.551 PAIN IN RIGHT HIP: ICD-10-CM

## 2021-05-18 DIAGNOSIS — M25.551 RIGHT HIP PAIN: ICD-10-CM

## 2021-05-18 DIAGNOSIS — M51.36 OTHER INTERVERTEBRAL DISC DEGENERATION, LUMBAR REGION: ICD-10-CM

## 2021-05-18 PROCEDURE — 72192 CT PELVIS W/O DYE: CPT

## 2021-05-18 PROCEDURE — 73700 CT LOWER EXTREMITY W/O DYE: CPT

## 2021-06-25 ENCOUNTER — HOSPITAL ENCOUNTER (OUTPATIENT)
Dept: ULTRASOUND IMAGING | Facility: HOSPITAL | Age: 71
Discharge: HOME | End: 2021-06-25

## 2021-06-25 ENCOUNTER — HOSPITAL ENCOUNTER (OUTPATIENT)
Dept: GENERAL RADIOLOGY | Facility: HOSPITAL | Age: 71
Discharge: HOME | End: 2021-06-25

## 2021-06-25 ENCOUNTER — TRANSCRIBE ORDERS (OUTPATIENT)
Dept: ADMINISTRATIVE | Facility: HOSPITAL | Age: 71
End: 2021-06-25

## 2021-06-25 DIAGNOSIS — R60.9 EDEMA, UNSPECIFIED TYPE: ICD-10-CM

## 2021-06-25 DIAGNOSIS — M25.572 PAIN IN LEFT ANKLE AND JOINTS OF LEFT FOOT: Primary | ICD-10-CM

## 2021-06-25 PROCEDURE — 73630 X-RAY EXAM OF FOOT: CPT

## 2021-06-25 PROCEDURE — 93971 EXTREMITY STUDY: CPT

## 2021-06-25 PROCEDURE — 73610 X-RAY EXAM OF ANKLE: CPT

## 2021-06-29 ENCOUNTER — TELEPHONE (OUTPATIENT)
Dept: NEUROSURGERY | Facility: CLINIC | Age: 71
End: 2021-06-29

## 2021-06-29 NOTE — TELEPHONE ENCOUNTER
PT CONFIRMED APT WITH NAVEEN RAMON FOR 06/30/2021 @ 9:00    PT REMINDED TO BRING COMPLETED PAPERWORK

## 2021-06-30 ENCOUNTER — OFFICE VISIT (OUTPATIENT)
Dept: NEUROSURGERY | Facility: CLINIC | Age: 71
End: 2021-06-30

## 2021-06-30 VITALS
DIASTOLIC BLOOD PRESSURE: 88 MMHG | WEIGHT: 137 LBS | HEIGHT: 64 IN | SYSTOLIC BLOOD PRESSURE: 128 MMHG | BODY MASS INDEX: 23.39 KG/M2

## 2021-06-30 DIAGNOSIS — M51.37 DEGENERATION OF LUMBAR OR LUMBOSACRAL INTERVERTEBRAL DISC: Primary | ICD-10-CM

## 2021-06-30 DIAGNOSIS — S32.9XXD CLOSED NONDISPLACED FRACTURE OF PELVIS WITH ROUTINE HEALING, UNSPECIFIED PART OF PELVIS, SUBSEQUENT ENCOUNTER: ICD-10-CM

## 2021-06-30 DIAGNOSIS — Z78.9 NON-SMOKER: ICD-10-CM

## 2021-06-30 PROBLEM — M51.379 DEGENERATION OF LUMBAR OR LUMBOSACRAL INTERVERTEBRAL DISC: Status: ACTIVE | Noted: 2021-06-30

## 2021-06-30 PROCEDURE — 99213 OFFICE O/P EST LOW 20 MIN: CPT | Performed by: NURSE PRACTITIONER

## 2021-06-30 NOTE — PROGRESS NOTES
Chief complaint:   Chief Complaint   Patient presents with   • Back Pain     Liset has been referred here today for injuries received after a fall from some bleachers in April, she did fracture her pelvis, but also has some back pain and right hip pain.  She has had no physical therapy due to the pain from the pelvis fracture.        Subjective     HPI: Is a 70-year-old female patient who was referred to us by Dr. Bates for back pain as well as pelvis pain.  She is here to be evaluated today.  The patient says that she did sustain a fall back in April 2001 off of the top level of the bleachers and landed on her left side.  The patient did have to get transferred to [place].  It was found that she did have a pelvic fracture as well as a hematoma.  The patient did follow-up with Dr. Robert Lopez for the pelvic fracture and it was felt that there is nothing from a surgical standpoint that needed to be done and just allow the fracture to heal on their own.  The patient says that she does have back pain but her back pain is not any worse than what it was before the fall.  Pain in back is constant.  Nothing makes it worse or better.  She does complain of some groin pain as well as some buttocks pain and pain that radiates down her leg in the inner aspect of her leg on the right to her knee.  She says overall the pain that she was experiencing from the pelvic fractures is about 90% better.  She denies any bowel or bladder incontinence.  She has not had any recent physical therapy, chiropractic care, or pain management injections.  She is right-hand dominant.  She is retired.  Denies any tobacco, alcohol, or illicit drug use.  Rates her pain a scale 0-10 at a 4.    Review of Systems   Respiratory: Positive for shortness of breath.    Gastrointestinal: Positive for constipation.   Musculoskeletal: Positive for back pain.   Neurological: Positive for headaches.   All other systems reviewed and are negative.       Past  Medical History:   Diagnosis Date   • Acid reflux    • Alpha-1-antitrypsin deficiency (CMS/HCC)    • Arrhythmia    • Arthritis    • Asthma    • Cancer (CMS/HCC)     leukemia   • Hypertension    • Low back pain    • Palpitations      Past Surgical History:   Procedure Laterality Date   • CARDIAC CATHETERIZATION     • CHOLECYSTECTOMY     • COLONOSCOPY  10/16/2015    normal   • COLONOSCOPY N/A 10/19/2020    Procedure: COLONOSCOPY WITH ANESTHESIA;  Surgeon: Grant Kaur DO;  Location: [facility] ENDOSCOPY;  Service: Gastroenterology;  Laterality: N/A;  pre: screening  post: normal  Harry Hastings MD   • ENDOSCOPY N/A 11/23/2016    normal   • ENDOSCOPY N/A 12/11/2018    Procedure: ESOPHAGOGASTRODUODENOSCOPY WITH ANESTHESIA;  Surgeon: Grant Kaur DO;  Location:  [facility] ENDOSCOPY;  Service: Gastroenterology   • ENDOSCOPY N/A 11/25/2020    Procedure: ESOPHAGOGASTRODUODENOSCOPY WITH ANESTHESIA;  Surgeon: Grant Kaur DO;  Location: [facility] ENDOSCOPY;  Service: Gastroenterology;  Laterality: N/A;  preop; chest pain  postop; normal   PCP Harry Hastings    • HYSTERECTOMY       Family History   Problem Relation Age of Onset   • Cancer Mother    • Cancer Father         lung   • Cancer Paternal Grandmother         stomach   • No Known Problems Brother    • No Known Problems Maternal Aunt    • No Known Problems Maternal Uncle    • No Known Problems Paternal Aunt    • No Known Problems Paternal Uncle    • No Known Problems Maternal Grandmother    • No Known Problems Maternal Grandfather    • No Known Problems Paternal Grandfather    • No Known Problems Other    • Colon cancer Neg Hx    • Esophageal cancer Neg Hx    • Asthma Neg Hx    • Diabetes Neg Hx    • Emphysema Neg Hx    • Heart failure Neg Hx    • Hypertension Neg Hx    • Colon polyps Neg Hx      Social History     Tobacco Use   • Smoking status: Never Smoker   • Smokeless tobacco: Never Used   Substance Use Topics   • Alcohol use: No   • Drug use: No     (Not in a  "hospital admission)    Allergies:  Levofloxacin, Phenergan [promethazine hcl], and Altace [ramipril]    Objective      Vital Signs  /88   Ht 162.6 cm (64\")   Wt 62.1 kg (137 lb)   BMI 23.52 kg/m²     Physical Exam  Constitutional:       Appearance: Normal appearance. She is well-developed.   HENT:      Head: Normocephalic.   Eyes:      General: Lids are normal.      Conjunctiva/sclera: Conjunctivae normal.      Pupils: Pupils are equal, round, and reactive to light.   Cardiovascular:      Rate and Rhythm: Normal rate and regular rhythm.   Pulmonary:      Effort: Pulmonary effort is normal.      Breath sounds: Normal breath sounds.   Musculoskeletal:         General: Normal range of motion.      Cervical back: Normal range of motion.      Comments: Back and groin pain   Skin:     General: Skin is warm.   Neurological:      Mental Status: She is alert and oriented to person, place, and time.      GCS: GCS eye subscore is 4. GCS verbal subscore is 5. GCS motor subscore is 6.      Cranial Nerves: No cranial nerve deficit.      Sensory: No sensory deficit.      Deep Tendon Reflexes: Reflexes are normal and symmetric. Reflexes normal.   Psychiatric:         Speech: Speech normal.         Behavior: Behavior normal.         Thought Content: Thought content normal.         Neurologic Exam     Mental Status   Oriented to person, place, and time.   Speech: speech is normal     Cranial Nerves     CN III, IV, VI   Pupils are equal, round, and reactive to light.      Imaging review: CT scan of the lumbar spine that was done here [facility] with Maria Esther on April 12, 2021 shows the patient does have degeneration at L3-4 with Modic endplate changes.  There is a scoliosis curvature due to the degeneration.  No fracture of the lumbar spine.    CT scan of the pelvis that was done on May 18, 2021 shows patient does have a nondisplaced fracture of the left sacrum as well as a comminuted fracture of the right superior pubic ramus " and a fracture of the right inferior pubic ramus and fracture of the right and initial tuberosity      Assessment/Plan:   For first line conservative care of lumbar pain and pelvic fractures, I would like to send Ms. Wright for a dedicated course of physician directed physical therapy consisting of 2-3 times a week for 4-6 weeks.    We will need to see the patient on an as-needed basis.  If she completes the physical therapy and still is having some issues related to the pelvic fracture she was told that she needs to follow-up with the orthopedic surgeon.    Should Ms. Wright not have any improvement from physical therapy, it was offered that she could return to our office and we can send the patient for an MRI of the lumbar spine to see if there is anything from a surgical standpoint that needs to be addressed.     I advised the patient to call and return sooner for new or worsening complaints of weakness, paresthesias, gait disturbances, or any additional concerns.  Treatment options discussed in detail with Liset and the patient voiced understanding.  Ms. Wright agrees with this plan of care.     Patient is a nonsmoker  The patient's Body mass index is 23.52 kg/m².. BMI is within normal parameters. No follow-up required.  Advance Care Planning   ACP discussion was held with the patient during this visit. Patient does not have an advance directive, information provided.      Diagnoses and all orders for this visit:    1. Degeneration of lumbar or lumbosacral intervertebral disc (Primary)  -     Ambulatory Referral to Physical Therapy Evaluate and treat (2-3 days a week for 4-6 weeks )    2. Closed nondisplaced fracture of pelvis with routine healing, unspecified part of pelvis, subsequent encounter  -     Ambulatory Referral to Physical Therapy Evaluate and treat (2-3 days a week for 4-6 weeks )    3. Non-smoker    4. Body mass index (BMI) of 23.0 to 23.9 in adult          I discussed the patients findings and my  recommendations with patient    Chinedu Warren, APRN  06/30/21  09:53 CDT

## 2021-08-09 NOTE — PROGRESS NOTES
"Chief Complaint  Mild intermittent asthma without complication and Bronchitis, not specified as acute or chronic    Subjective    History of Present Illness     Liset Wright presents to [facility]   Ms. Wright is a pleasant 70 year old female patient of Dr. Johnson with known Asthma, scarring of lung, CLL, Bronchitis, Grade II diastolic dysfunction, GERD. She is controlled on her acid reflux. She denies cough, fever, chills, dizziness, swelling. She has shortness of breath at times if she exerts herself some. She does have an inhaler and she will use it at times. She is having some sinus drainage. She has tried Zyrtec a couple of times. This made her sleepy. She has not tried nasal sprays. The zyrtec does seem to help. She did have a sore throat a couple of weeks ago. She uses the rescue inhaler once a week on average.        Objective   Vital Signs:   /72   Pulse 70   Ht 162.6 cm (64\")   Wt 62.1 kg (137 lb)   SpO2 97%   BMI 23.52 kg/m²     Physical Exam  Vitals reviewed.   Constitutional:       Appearance: Normal appearance.   Cardiovascular:      Rate and Rhythm: Normal rate and regular rhythm.   Pulmonary:      Effort: Pulmonary effort is normal.      Breath sounds: Normal breath sounds.   Neurological:      General: No focal deficit present.      Mental Status: She is alert and oriented to person, place, and time.   Psychiatric:         Mood and Affect: Mood normal.         Behavior: Behavior normal.          Result Review :  The following data was reviewed by: YENNY Castro on 08/12/2021:    My interpretation of imaging:  No new imaging  My interpretation of labs: No new labs      My interpretation of the PFT: no new     Results for orders placed in visit on 07/17/19    Pulmonary Function Test    Patient's BMI 23.52. BMI is within normal parameters. No follow-up required..    Assessment and Plan   Diagnoses and all orders for this " visit:    1. Mild intermittent asthma without complication (Primary)    2. Scarring of lung    3. Grade II diastolic dysfunction    4. CLL (chronic lymphocytic leukemia) (CMS/HCC)    5. GERD without esophagitis      She is stable at this time. She does not feel she needs a maintenance inhaler but will call should her breathing get worse. She will continue the rescue inhaler as needed. She will try using the Zytrec at night and add in the Flonase nasal spray if needed. Reassess for PFTs at next visit. Last done in July 2019.     Alpha 1: None    Health maintenance:   Influenza vaccine: Nov 2020  Pneumovax 23: Oct 2018  Prevnar 13: Oct 2017  Covid-19 Pfizer Jan/ Feb 2021    Follow Up   No follow-ups on file.  Patient was given instructions and counseling regarding her condition or for health maintenance advice. Please see specific information pulled into the AVS if appropriate.     Lisa Alvarado, APRN  8/12/2021  13:26 CDT

## 2021-08-12 ENCOUNTER — OFFICE VISIT (OUTPATIENT)
Dept: PULMONOLOGY | Facility: CLINIC | Age: 71
End: 2021-08-12

## 2021-08-12 VITALS
WEIGHT: 137 LBS | OXYGEN SATURATION: 97 % | HEIGHT: 64 IN | HEART RATE: 70 BPM | BODY MASS INDEX: 23.39 KG/M2 | DIASTOLIC BLOOD PRESSURE: 72 MMHG | SYSTOLIC BLOOD PRESSURE: 138 MMHG

## 2021-08-12 DIAGNOSIS — J98.4 SCARRING OF LUNG: ICD-10-CM

## 2021-08-12 DIAGNOSIS — I51.89 GRADE II DIASTOLIC DYSFUNCTION: ICD-10-CM

## 2021-08-12 DIAGNOSIS — C91.10 CLL (CHRONIC LYMPHOCYTIC LEUKEMIA) (HCC): ICD-10-CM

## 2021-08-12 DIAGNOSIS — K21.9 GERD WITHOUT ESOPHAGITIS: ICD-10-CM

## 2021-08-12 DIAGNOSIS — J45.20 MILD INTERMITTENT ASTHMA WITHOUT COMPLICATION: Primary | ICD-10-CM

## 2021-08-12 PROCEDURE — 99213 OFFICE O/P EST LOW 20 MIN: CPT | Performed by: NURSE PRACTITIONER

## 2021-08-12 RX ORDER — ALBUTEROL SULFATE 90 UG/1
2 AEROSOL, METERED RESPIRATORY (INHALATION) EVERY 4 HOURS PRN
COMMUNITY
End: 2022-08-10 | Stop reason: SDUPTHER

## 2021-08-12 NOTE — PATIENT INSTRUCTIONS
Try taking the Zyrtec at night since it makes you sleepy.  You can add Flonase nasal spray as well.   Continue the Nexium/ Prilosec for your acid reflux

## 2021-08-15 NOTE — PROGRESS NOTES
[facility] ON[facility] GROUP HEMATOLOGY AND ONCOLOGY  [address]  [facility] [address] [phone]    Patient Name: Liset Wright  Encounter Date: 08/23/2021  YOB: 1950  Patient Number: [id number]         REASON FOR VISIT:  Liste Wright is a 70-year-old female who returns in follow-up of stage 0 chronic lymphocytic leukemia (B-CLL). She is seen 31 months since her initial visit on 01/28/2019. She remains in observation. She is here alone.    I have reviewed the HPI and verified with the patient the accuracy of it. No changes to interval history since the information was documented. Jay Cedeno MD 08/23/21      DIAGNOSTIC ABNORMALITIES:   1. On 11/28/2018, labs showed a WBC of 13.9 with 78% lymphocytes (ALC 10.9), ANC 2.4, and was otherwise normal. Hemoglobin 12, hematocrit 36.7, MCV 87, platelets 261,000. CMP was normal in its entirety to include a BUN of 11, creatinine 0.78, GFR 79, calcium 9.2, total protein 6.8, and normal liver enzymes.  2. On 01/11/2019, CT of the abdomen and pelvis with IV contrast at [facility] showed no acute pathology in the abdomen or pelvis (no masses or any abnormalities to account for her left upper quadrant pain with no adenopathy and normal spleen).  3. On 01/16/2019, Ainsley-Barr virus titers showed an IgG level of 239 (0 - 17.9), Ainsley-Barr virus nuclear antigen IgG 118 (0 - 17.9), but IgM of less than 36 and early antigen of less than 9 (indicating prior infection).  4. On 01/10/2019, repeat CBC showed a hemoglobin of 11.3, hematocrit 35.5, MCV 91.3, platelets 249,000, WBC 13.29.   5. On 01/14/2019, blood smear (manual) review showed atypical lymphocytosis, moderate smudge cells present. RBC morphology showed normocytic, normochromic anemia without significant morphologic abnormality. Platelets normal morphology.  6. On 01/17/2019, peripheral blood was sent for flow cytometry ([facility]). This revealed chronic lymphocytic leukemia (54% of total cells). Monoclonal B cells have a CLL immunophenotype and are negative for both CD38 and ZAP-70 associated with standard risk disease. PCR for IGVH and p53 mutation and FISH for CLL may provide additional prognostic information. Virtually all of the B cells are monoclonal and express CD19 (dim), CD20 (dim), CD5, CD23, CD11c, and dim surface kappa light chain. They are negative for CD10, CD38, FMC-7, ZAP-70, and surface lambda light chain.   7. Labs, 01/28/2019: Hemoglobin 13.1, hematocrit 39.5, MCV 90.5, platelets 283,000, WBC 12.2 with 19.3 segs (ANC 2.4), 75.8 lymphocytes (ALC 9.2). CMP normal. GFR 85, calcium 9.9, total protein 7.1, and normal liver enzymes.  (265 - 665). Beta 2 microglobulin 1.3. Serum iron 101, TIBC 333, iron saturation 30%, B12 of 431, folate 15 (each within reference range). Ferritin 23.7 (depressed). HIV screen negative. IgG 742.  8. Bone marrow biopsy/aspirate, 02/01/2019: PERIPHERAL BLOOD: B cell chronic lymphocytic leukemia. BONE MARROW, UNSPECIFIED SITE, SMEARS, CLOT AND BIOPSY: Involved by B cell chronic lymphocytic leukemia (30%). CLL panel: Positive for a deletion of DLEUI, DILEU2 on chromosome 13 at 04 (58,0% of cells), consistent with CLL. Isolated del 1304.3 is associated with a favorable clinical course. Three of the twenty mitotic cells examined were characterized by loss of one copy of the X chromosome and a deletion in the long arm of chromosome 13.   9. Stools OB x 3, 02/25/2019. Negative x 3    PREVIOUS INTERVENTIONS:   1. Observation        Problem List Items Addressed This Visit        Other    CLL (chronic lymphocytic leukemia) (CMS/HCC) - Primary        Oncology/Hematology History    No history exists.       PAST MEDICAL HISTORY:  ALLERGIES:  Allergies   Allergen Reactions   • Levofloxacin Paresthesia   • Phenergan [Promethazine Hcl] Other (See Comments)     Jerky movements   • Promethazine Unknown  - High Severity   • Ramipril Palpitations     CURRENT MEDICATIONS:  Outpatient Encounter Medications as of 8/23/2021   Medication Sig Dispense Refill   • albuterol sulfate  (90 Base) MCG/ACT inhaler Inhale 2 puffs Every 4 (Four) Hours As Needed for Wheezing.     • dicyclomine (BENTYL) 10 MG capsule Take 10 mg by mouth 2 (Two) Times a Day.     • esomeprazole (nexIUM) 40 MG capsule Take 1 capsule by mouth 2 (Two) Times a Day. (Patient taking differently: Take 40 mg by mouth Daily.) 60 capsule 11   • metoprolol tartrate (LOPRESSOR) 100 MG tablet Take 100 mg by mouth 3 (Three) Times a Day.     • omeprazole (priLOSEC) 40 MG capsule Take 40 mg by mouth Daily.     • valsartan (DIOVAN) 320 MG tablet Take 160 mg by mouth Daily.       No facility-administered encounter medications on file as of 8/23/2021.     ADULT ILLNESSES:   Chronic lymphocytic leukemia ( ICD-10:C91.10 ;Chronic lymphocytic leukemia of B-cell type not having achieved remission   Abdominal pain ( ICD-10:R10.12 ;Left upper quadrant pain   Asthma ( Dr. Johnson; ICD-10:J45.909 ;Unspecified asthma, uncomplicated )   Cervical degenerative disk disease ( x-ray 11/2017; ICD-10:M50.20 ;Other cervical disc displacement, unspecified cervical region )   Erosive gastritis ( Dr. Kaur; ICD-10:K29.60 ;Other gastritis without bleeding )   Gastroesophageal reflux disease ( GERD, Dr. Kaur; ICD-10:K21.9 ;Gastro-esophageal reflux disease without esophagitis )   Gastroparesis ( ICD-10:K31.84 ;Gastroparesis   Hypertension ( ICD-10:I10 ;Essential (primary) hypertension   Irritable bowel syndrome ( ICD-10:K58.9 ;Irritable bowel syndrome without diarrhea   Migraines ( ICD-10:G43.909 ;Migraine, unspecified, not intractable, without status migrainosus   Normocytic anemia ( ICD-10:D64.9 ;Anemia, unspecified   Palpitations ( normal heart cath, 02/2003, bilateral ultrasound showed no significant stenosis, 06/2015, stress echo 05/2018 normal; ICD-10:R00.2 ;Palpitations )  "  Schatzki esophageal ring ( erosive gastritis/gastroesophageal reflux disease, Dr. Kaur; ICD-10:K22.2 ;Esophageal obstruction )  Right hip fracture following a fall, 04/13/2021.  Non-surgical management      SURGERIES:   Cholecystectomy   Cardiac catheterization, 02/2003   Colonoscopy, 2017. \"No polyps.\" 5 years. Dr. Kaur   EGD (upper endoscopy), 2018, normal, Dr. Kaur   Hysterectomy, total      ADULT ILLNESSES:  Patient Active Problem List   Diagnosis Code   • Palpitations R00.2   • PVCs (premature ventricular contractions) I49.3   • Essential hypertension I10   • Epigastric pain R10.13   • CLL (chronic lymphocytic leukemia) (CMS/[facility]) C91.10   • Bronchitis J40   • Restrictive lung disease J98.4   • Encounter for screening for malignant neoplasm of colon Z12.11   • Right ventricular dilation I51.7   • Chest pain R07.9   • Closed fracture of pelvis with routine healing S32.9XXD   • Degeneration of lumbar or lumbosacral intervertebral disc M51.37   • Non-smoker Z78.9   • Body mass index (BMI) of 23.0 to 23.9 in adult Z68.23   • Gastroesophageal reflux disease K21.9   • Leukemia (CMS/[facility]) C95.90     SURGERIES:  Past Surgical History:   Procedure Laterality Date   • CARDIAC CATHETERIZATION     • CHOLECYSTECTOMY     • COLONOSCOPY  10/16/2015    normal   • COLONOSCOPY N/A 10/19/2020    Procedure: COLONOSCOPY WITH ANESTHESIA;  Surgeon: Grant Kaur DO;  Location: [facility] ENDOSCOPY;  Service: Gastroenterology;  Laterality: N/A;  pre: screening  post: normal  Harry Hastings MD   • ENDOSCOPY N/A 11/23/2016    normal   • ENDOSCOPY N/A 12/11/2018    Procedure: ESOPHAGOGASTRODUODENOSCOPY WITH ANESTHESIA;  Surgeon: Grant Kaur DO;  Location: [facility] ENDOSCOPY;  Service: Gastroenterology   • ENDOSCOPY N/A 11/25/2020    Procedure: ESOPHAGOGASTRODUODENOSCOPY WITH ANESTHESIA;  Surgeon: Grant Kaur DO;  Location: [facility] ENDOSCOPY;  Service: Gastroenterology;  Laterality: N/A;  preop; chest pain  postop; " normal   PCP Harry Hastings    • HYSTERECTOMY       HEALTH MAINTENANCE ITEMS:  Health Maintenance Due   Topic Date Due   • TDAP/TD VACCINES (1 - Tdap) Never done   • ZOSTER VACCINE (1 of 2) Never done   • HEPATITIS C SCREENING  Never done       <no information>  Last Completed Colonoscopy     This patient has no relevant Health Maintenance data.        Immunization History   Administered Date(s) Administered   • COVID-19 (PFIZER) 01/15/2021, 02/05/2021   • FLUAD TRI 65YR+ 10/06/2017, 10/09/2018   • Flu Vaccine Quad PF >18YRS 10/01/2018   • Flulaval/Fluarix/Fluzone Quad 11/01/2019   • Hep B, Unspecified 01/10/2002, 02/15/2002, 05/16/2002   • Influenza Quad Vaccine (Inpatient) 10/01/2018   • Influenza, Unspecified 11/02/2020   • Pneumococcal Conjugate 13-Valent (PCV13) 10/06/2017   • Pneumococcal Polysaccharide (PPSV23) 10/01/2018     Last Completed Mammogram          MAMMOGRAM (Every 2 Years) Next due on 1/14/2023 01/14/2021  Outside Claim: HC MAMMOGRAM SCREENING BILAT DIGITAL W CAD,CHG SCREENING DIGITAL BREAST TOMOSYNTHESIS BI    12/21/2018  Outside Claim: HC MAMMOGRAM SCREENING BILAT DIGITAL W CAD,CHG SCREENING DIGITAL BREAST TOMOSYNTHESIS BI    11/07/2017  Outside Claim: MD Scr mammo bi incl cad,CHG SCREENING DIGITAL BREAST TOMOSYNTHESIS BI    11/30/2016  Outside Claim: MD SCREENINGMAMMOGRAPHYDIGITAL    05/11/2015  Outside Claim: MD SCREENINGMAMMOGRAPHYDIGITAL                  FAMILY HISTORY:  Family History   Problem Relation Age of Onset   • Cancer Mother    • Cancer Father         lung   • Cancer Paternal Grandmother         stomach   • No Known Problems Brother    • No Known Problems Maternal Aunt    • No Known Problems Maternal Uncle    • No Known Problems Paternal Aunt    • No Known Problems Paternal Uncle    • No Known Problems Maternal Grandmother    • No Known Problems Maternal Grandfather    • No Known Problems Paternal Grandfather    • No Known Problems Other    • Colon cancer Neg Hx    • Esophageal  "cancer Neg Hx    • Asthma Neg Hx    • Diabetes Neg Hx    • Emphysema Neg Hx    • Heart failure Neg Hx    • Hypertension Neg Hx    • Colon polyps Neg Hx      SOCIAL HISTORY:  Social History     Socioeconomic History   • Marital status:      Spouse name: Not on file   • Number of children: Not on file   • Years of education: Not on file   • Highest education level: Not on file   Tobacco Use   • Smoking status: Never Smoker   • Smokeless tobacco: Never Used   Substance and Sexual Activity   • Alcohol use: No   • Drug use: No   • Sexual activity: Defer       REVIEW OF SYSTEMS:  Constitutional:   The patient's appetite is good but energy is variable, some days better than others.  \"I feel ok.\" She manages her ADLs including chores, errands.  She still drives.  She has gained 1 pound (in addition to 8 pounds at her 2 prior visits) since her last visit. She has no fevers, chills, or drenching night sweats. Her sleep habits seem appropriate.  Ear/Nose/Throat/Mouth:   She reports no ear pains, sinus symptoms, sore throat, nosebleeds, or sore tongue. She has migraine headaches. She denies any hoarseness, change in voice quality.   Ocular:   She reports no eye pain, significant change in visual acuity, double vision, or blurry vision.  Respiratory:   Says she is prone to respiratory infections due to alpha 1 antitrypsin deficiency.  She has some exertional dyspnea from possible asthma but no chronic cough, significant shortness of breathing at rest, phlegm production, or unexplained chest wall pain. Says prior PFTs suggest \"maybe asthma\".  Has nor needed antibiotics in the interval.  Cardiovascular:   She reports no exertional chest pain, chest pressure, or chest heaviness. She reports no claudication. She reports occasional palpitations but denies symptomatic orthostasis.  Gastrointestinal:   She reports no dysphagia, nausea, vomiting, postprandial abdominal pain, bloating, cramping, change in bowel habits, with dark " "(OTC iron) discoloration of the stool. She reports no rectal bleeding. She reports occasional but chronic constipation requiring stool softeners, lifelong. She has no diarrhea.  Genitourinary:   She reports no urinary burning, frequency, dribbling, or discoloration. She reports no difficulty controlling her bladder. She has no need to urinate frequently through the night.   Musculoskeletal:          She reports no unexplained arthralgias, myalgias, or nighttime leg cramping.  Says she sustained a right hip fracture following a fall, 04/13/2021.  Non-surgical management.  No pains now.    Extremities:   She reports no trouble with fluid retention or significant leg swelling.  Endocrine:   She reports no problems with excess thirst, excessive urination, vasomotor instability, or unexplained fatigue.  Heme/Lymphatic:   She reports easy bruising but no unexplained bleeding, petechial rashes, or swollen glands.  Skin:   She reports no itching, rashes, or lesions which won't heal.  Neuro:   She reports no loss of consciousness, seizures, fainting spells, or dizziness. She reports no weakness of face, arms, or legs. She has no difficulty with speech. She has no tremors. She admits to occasional paresthesias of the hands.   Psych:   She seems generally satisfied with life. She denies depression. She reports no mood swings.       VITAL SIGNS: /86   Pulse 61   Temp 97.3 °F (36.3 °C)   Resp 18   Ht 162.6 cm (64\")   Wt 63.4 kg (139 lb 11.2 oz)   SpO2 97%   Breastfeeding No   BMI 23.98 kg/m² Body surface area is 1.68 meters squared.  Pain Score    08/23/21 0918   PainSc: 0-No pain         PHYSICAL EXAMINATION:   General:   She is a pleasant, cooperative, slender but otherwise well-developed, well-nourished, and modestly-kept elderly female who is comfortable at rest. She arrived in the exam room ambulatory. She appears to be her stated age. Her skin color is normal. ECOG 0  Head/Neck:   The patient is anicteric and " atraumatic. She is wearing a surgical mask today. The trachea is midline. The neck is supple without evidence of jugular venous distention or cervical adenopathy. Prominent, non-tender right sternoclavicular joint.  Eyes:   The pupils are equal, round, and reactive to light. The extraocular movements are full. There is no scleral jaundice or erythema.   Chest:   The respiratory efforts are normal and unhindered. The chest is clear to auscultation and percussion. There are no wheezes, rhonchi, rales, or asymmetry of breath sounds.  Cardiovascular:   The patient has a regular cardiac rate and rhythm without murmurs, rubs, or gallops. The peripheral pulses are equal and full.  Abdomen:   The belly is soft and flat. There is no rebound or guarding. There is no organomegaly, mass-effect, or tenderness. Bowel sounds are active and of normal character.  Extremities:   There is no evidence of cyanosis, clubbing, or edema.  Rheumatologic:   There is no overt evidence of rheumatoid deformities of the hands. There is no sausaging of the fingers. There is no sign of active synovitis. The gait is normal.  Cutaneous:   There are no overt rashes, disseminated lesions, purpura, or petechiae.   Lymphatics:   There is no evidence of adenopathy in the cervical, supraclavicular, axillary, inguinal, or femoral areas. There is no splenomegaly.  Neurologic:   The patient is alert, oriented, cooperative, and pleasant. She is appropriately conversant. She ambulated into the exam room without assistance and transferred from chair to exam table unaided. There is no overt dysfunction of the motor, sensory, cerebellar systems.  Psych:   Mood and affect are appropriate for circumstance. Eye contact is appropriate. Normal judgement and decision making.         LABS    Lab Results - Last 18 Months   Lab Units 08/16/21  0916 04/12/21  2124 02/10/21  0856 08/12/20  0922 02/27/20  0833   HEMOGLOBIN g/dL 11.8* 11.4* 12.1 12.4 13.0   HEMATOCRIT % 36.9  34.4 35.0 37.7 39.4   MCV fL 92.0 90.1 90.2 92.2 90.8   WBC 10*3/mm3 13.02* 21.09* 15.68* 14.59* 15.80*   RDW % 13.1 12.9 12.9 12.5 13.0   MPV fL 9.8 9.6 9.6 9.5 9.3   PLATELETS 10*3/mm3 241 204 213 227 236   NEUTROS ABS 10*3/mm3 2.86 9.41* 3.84 3.60 3.99   LYMPHS ABS 10*3/mm3  --   --  11.21*  --  11.29*   MONOS ABS 10*3/mm3  --   --  0.47  --  0.37   EOS ABS 10*3/mm3 0.26  --  0.09 0.45* 0.10   BASOS ABS 10*3/mm3  --   --  0.04  --  0.03   NEUTROPHIL % % 22.0* 44.6  --  24.7*  --    MONOCYTES % % 2.0*  --   --  4.1*  --    ATYP LYMPH % % 2.0 2.0  --  4.1  --        Lab Results - Last 18 Months   Lab Units 04/12/21 2124 02/10/21  0856 08/12/20  0922 08/04/20  0723 02/27/20  0833   GLUCOSE mg/dL 126* 116* 112*  --  123*   SODIUM mmol/L 137 139 138  --  140   POTASSIUM mmol/L 3.7 4.2 4.5  --  4.4   CO2 mmol/L 29.0 30.0* 30.0*  --  28.0   CHLORIDE mmol/L 101 103 100  --  102   ANION GAP mmol/L 7.0 6.0 8.0  --  10.0   CREATININE mg/dL 0.60 0.69 0.69 1.00 0.74   BUN mg/dL 11 10 11  --  13   BUN / CREAT RATIO  18.3 14.5 15.9  --  17.6   CALCIUM mg/dL 9.3 9.1 9.3  --  9.3   EGFR IF NONAFRICN AM mL/min/1.73 99 85 85  --  78   ALK PHOS U/L  --  105 85  --  101   TOTAL PROTEIN g/dL  --  6.6 6.5  --  7.0   ALT (SGPT) U/L  --  15 19  --  13   AST (SGOT) U/L  --  22 22  --  19   BILIRUBIN mg/dL  --  0.7 0.7  --  0.8   ALBUMIN g/dL  --  4.30 4.50  --  4.60   GLOBULIN gm/dL  --  2.3 2.0  --  2.4       Lab Results - Last 18 Months   Lab Units 08/16/21  0916 02/10/21  0856 08/12/20  0922 02/27/20  0833   LDH U/L 253* 219* 215* 213       Lab Results - Last 18 Months   Lab Units 08/16/21  0916 02/10/21  0856 08/12/20 0922 02/27/20  0833   IRON mcg/dL 89 67  --  86   TIBC mcg/dL 310 302  --  323   IRON SATURATION % 29 22  --  27   FERRITIN ng/mL 116.40 159.30* 145.40 131.50       ASSESSMENT:   1. B cell chronic lymphocytic leukemia (B-CLL):   Stage: 0   Tumor [place]: Lymphocytosis.   Complications of Tumor: None.   Tumor Status:  Untreated.   IVGH and p53 mutations: Pending.   CD38: Negative.   ZAP-70: Negative.   Isolated del 13q14.3 (favorable)   LDH: 219, 02/10/2021 (prior: 213 - 599)   B2M: 1.6, 02/10/2021 (prior: 1.3 - 2.1)   Ig, 02/10/2021 (prior: 666 - 742)  2021- WBC 13; ALC 9.63, 2021  (range: WBC 13.9-23.99; ALC 8.64-13.83)  2. Normocytic anemia.    --Repleted ferritin -159, 02/10/2021 (from 145.4, 2020 (from 131.5; from 53.2; from 35; from 23).   --Hgb 11.8; MCV 92, 2021 (prior: Hgb 11.3 - 13.3; MCV 87 - 92.2)   3. Irritable bowel syndrome. On Bentyl  4. Gastroparesis. On omeprazole  5. Migraines. On Excedrin migraines  6. Cervical degenerative disk disease.  No meds        7.  Alpha 1 antitrypsin deficiency. Dr. Johnson        8.  Palpitations with echo, 2020 showing grade II diastolic dysfunction but EF > 70%.  Dr. Hawk follows        9.  Prominent right sterno-clavicular joint. Asymptomatic  --2020-right sternoclavicular joint x-ray.  Mild arthritic changes of the inferior proximal right clavicle adjacent to the sternoclavicular joint with appropriate alignment.         10. Received COVID # 2 vaccination, 2021       11. Right hip fracture following a fall, 2021.  Non-surgical management    RECOMMENDATIONS:   1.  Apprised of labs from 2021, Stable lymphocytosis, stable anemia, normal platelets, repleted ferritin (> 100) but otherwise repleted iron levels, normal B2M, slightly elevated LDH, stable IgG (> 500), pending CMP.  2.  Previously discussed the labs from  and 2019 stable low grade leukocytosis and lymphocytosis (greater than 5000) normal Hgb and normal platelets, normal CMP, normal LDH, normal B2M, repleted serum iron, repleted (> 20%) fe sat, low (< 100) ferritin, repleted B12/folate, negative HIV screen, IgG > 500.   3.  Previously discussed the bone marrow biopsy,  (above). Confirms B-CLL with del 13q14 (favorable)   4.  B- CLL again previously  "discussed. I had explained that standard therapy has not appreciably altered the nature history of CLL and survival curves demonstrate that CLL is an incurable disease. Randomized studies have failed to show a survival advantage of immediate treatment over delayed treatment for asymptomatic patients with early stage disease. As a result, treatment is generally reserved until the patient has active disease defined as the presence of disease-related symptoms: Weight loss greater than 10%; extreme fatigue; fever greater than 100.5 for 2 weeks with night sweats without evidence of infection (\"B\" symptoms); worsening cytopenias (HGB less than 11; platelets less than 100,000); unexplained recurrent infections; worsening adenopathy, or splenomegaly. She is aware to call should she develop any of these symptoms.    5.  Continue ongoing management per primary care.   6.  Return to the [place] office in 24 weeks with pre-office serum iron, Fe sat, ferritin, CBC and differential, IgG, LDH, B2M.    QUALITY MEASURES:   MEDICAL DECISION MAKING: Moderate Complexity   AMOUNT OF DATA: Limited  RISK OF COMPLICATIONS: Low     I spent  30 total minutes, face-to-face, caring for Liset today.  Greater than 50% of this time involved counseling and/or coordination of care as documented within this note regarding the patient's illness(es), pros and cons of various treatment options, instructions and/or risk reduction.    cc: Harry Hastings MD     "

## 2021-08-16 ENCOUNTER — LAB (OUTPATIENT)
Dept: LAB | Facility: HOSPITAL | Age: 71
End: 2021-08-16

## 2021-08-16 DIAGNOSIS — C91.10 CLL (CHRONIC LYMPHOCYTIC LEUKEMIA) (HCC): ICD-10-CM

## 2021-08-16 LAB
B2 MICROGLOB SERPL-MCNC: 1.6 MG/L (ref 0.8–2.2)
CYTOLOGIST CVX/VAG CYTO: NORMAL
DEPRECATED RDW RBC AUTO: 44 FL (ref 37–54)
EOSINOPHIL # BLD MANUAL: 0.26 10*3/MM3 (ref 0–0.4)
EOSINOPHIL NFR BLD MANUAL: 2 % (ref 0.3–6.2)
ERYTHROCYTE [DISTWIDTH] IN BLOOD BY AUTOMATED COUNT: 13.1 % (ref 12.3–15.4)
FERRITIN SERPL-MCNC: 116.4 NG/ML (ref 13–150)
HCT VFR BLD AUTO: 36.9 % (ref 34–46.6)
HGB BLD-MCNC: 11.8 G/DL (ref 12–15.9)
IGG1 SER-MCNC: 657 MG/DL (ref 700–1600)
IRON 24H UR-MRATE: 89 MCG/DL (ref 37–145)
IRON SATN MFR SERPL: 29 % (ref 20–50)
LDH SERPL-CCNC: 253 U/L (ref 135–214)
LYMPHOCYTES # BLD MANUAL: 9.63 10*3/MM3 (ref 0.7–3.1)
LYMPHOCYTES NFR BLD MANUAL: 2 % (ref 5–12)
LYMPHOCYTES NFR BLD MANUAL: 72 % (ref 19.6–45.3)
MCH RBC QN AUTO: 29.4 PG (ref 26.6–33)
MCHC RBC AUTO-ENTMCNC: 32 G/DL (ref 31.5–35.7)
MCV RBC AUTO: 92 FL (ref 79–97)
MONOCYTES # BLD AUTO: 0.26 10*3/MM3 (ref 0.1–0.9)
NEUTROPHILS # BLD AUTO: 2.86 10*3/MM3 (ref 1.7–7)
NEUTROPHILS NFR BLD MANUAL: 22 % (ref 42.7–76)
PATH INTERP BLD-IMP: NORMAL
PLATELET # BLD AUTO: 241 10*3/MM3 (ref 140–450)
PMV BLD AUTO: 9.8 FL (ref 6–12)
RBC # BLD AUTO: 4.01 10*6/MM3 (ref 3.77–5.28)
RBC MORPH BLD: NORMAL
SMALL PLATELETS BLD QL SMEAR: ADEQUATE
TIBC SERPL-MCNC: 310 MCG/DL (ref 298–536)
TRANSFERRIN SERPL-MCNC: 208 MG/DL (ref 200–360)
VARIANT LYMPHS NFR BLD MANUAL: 2 % (ref 0–5)
WBC # BLD AUTO: 13.02 10*3/MM3 (ref 3.4–10.8)
WBC MORPH BLD: NORMAL

## 2021-08-16 PROCEDURE — 85060 BLOOD SMEAR INTERPRETATION: CPT

## 2021-08-16 PROCEDURE — 82728 ASSAY OF FERRITIN: CPT

## 2021-08-16 PROCEDURE — 82784 ASSAY IGA/IGD/IGG/IGM EACH: CPT

## 2021-08-16 PROCEDURE — 84466 ASSAY OF TRANSFERRIN: CPT

## 2021-08-16 PROCEDURE — 83615 LACTATE (LD) (LDH) ENZYME: CPT

## 2021-08-16 PROCEDURE — 82232 ASSAY OF BETA-2 PROTEIN: CPT

## 2021-08-16 PROCEDURE — 85025 COMPLETE CBC W/AUTO DIFF WBC: CPT

## 2021-08-16 PROCEDURE — 36415 COLL VENOUS BLD VENIPUNCTURE: CPT

## 2021-08-16 PROCEDURE — 85007 BL SMEAR W/DIFF WBC COUNT: CPT

## 2021-08-16 PROCEDURE — 83540 ASSAY OF IRON: CPT

## 2021-08-23 ENCOUNTER — OFFICE VISIT (OUTPATIENT)
Dept: ONCOLOGY | Facility: CLINIC | Age: 71
End: 2021-08-23

## 2021-08-23 VITALS
OXYGEN SATURATION: 97 % | HEART RATE: 61 BPM | TEMPERATURE: 97.3 F | SYSTOLIC BLOOD PRESSURE: 132 MMHG | HEIGHT: 64 IN | RESPIRATION RATE: 18 BRPM | BODY MASS INDEX: 23.85 KG/M2 | WEIGHT: 139.7 LBS | DIASTOLIC BLOOD PRESSURE: 86 MMHG

## 2021-08-23 DIAGNOSIS — C91.10 CLL (CHRONIC LYMPHOCYTIC LEUKEMIA) (HCC): Primary | ICD-10-CM

## 2021-08-23 PROBLEM — K21.9 GASTROESOPHAGEAL REFLUX DISEASE: Status: ACTIVE | Noted: 2021-08-23

## 2021-08-23 PROBLEM — C95.90 LEUKEMIA: Status: ACTIVE | Noted: 2021-08-23

## 2021-08-23 PROCEDURE — 99214 OFFICE O/P EST MOD 30 MIN: CPT | Performed by: INTERNAL MEDICINE

## 2021-09-02 ENCOUNTER — LAB (OUTPATIENT)
Dept: LAB | Facility: HOSPITAL | Age: 71
End: 2021-09-02

## 2021-09-02 ENCOUNTER — TRANSCRIBE ORDERS (OUTPATIENT)
Dept: ADMINISTRATIVE | Facility: HOSPITAL | Age: 71
End: 2021-09-02

## 2021-09-02 DIAGNOSIS — R50.9 FEVER, UNSPECIFIED: ICD-10-CM

## 2021-09-02 DIAGNOSIS — R06.02 SHORTNESS OF BREATH: ICD-10-CM

## 2021-09-02 DIAGNOSIS — R05.9 COUGH: Primary | ICD-10-CM

## 2021-09-02 DIAGNOSIS — Z20.828 EXPOSURE TO SARS-ASSOCIATED CORONAVIRUS: ICD-10-CM

## 2021-09-02 LAB — SARS-COV-2 ORF1AB RESP QL NAA+PROBE: NOT DETECTED

## 2021-09-02 PROCEDURE — U0004 COV-19 TEST NON-CDC HGH THRU: HCPCS | Performed by: INTERNAL MEDICINE

## 2021-09-02 PROCEDURE — C9803 HOPD COVID-19 SPEC COLLECT: HCPCS | Performed by: INTERNAL MEDICINE

## 2021-09-02 PROCEDURE — U0005 INFEC AGEN DETEC AMPLI PROBE: HCPCS | Performed by: INTERNAL MEDICINE

## 2021-09-08 ENCOUNTER — TRANSCRIBE ORDERS (OUTPATIENT)
Dept: ADMINISTRATIVE | Facility: HOSPITAL | Age: 71
End: 2021-09-08

## 2021-09-08 ENCOUNTER — HOSPITAL ENCOUNTER (OUTPATIENT)
Dept: GENERAL RADIOLOGY | Facility: HOSPITAL | Age: 71
Discharge: HOME | End: 2021-09-08
Admitting: INTERNAL MEDICINE

## 2021-09-08 DIAGNOSIS — R05.9 COUGH: Primary | ICD-10-CM

## 2021-09-08 DIAGNOSIS — R05.9 COUGH: ICD-10-CM

## 2021-09-08 PROCEDURE — 71046 X-RAY EXAM CHEST 2 VIEWS: CPT

## 2021-10-26 ENCOUNTER — LAB (OUTPATIENT)
Dept: LAB | Facility: HOSPITAL | Age: 71
End: 2021-10-26

## 2021-10-26 ENCOUNTER — TRANSCRIBE ORDERS (OUTPATIENT)
Dept: LAB | Facility: HOSPITAL | Age: 71
End: 2021-10-26

## 2021-10-26 DIAGNOSIS — J06.9 ACUTE UPPER RESPIRATORY INFECTION, UNSPECIFIED: ICD-10-CM

## 2021-10-26 DIAGNOSIS — M79.10 MYALGIA, UNSPECIFIED SITE: ICD-10-CM

## 2021-10-26 DIAGNOSIS — R06.02 SHORTNESS OF BREATH: ICD-10-CM

## 2021-10-26 DIAGNOSIS — R49.0 DYSPHONIA: ICD-10-CM

## 2021-10-26 DIAGNOSIS — R05.9 COUGH: ICD-10-CM

## 2021-10-26 LAB — SARS-COV-2 ORF1AB RESP QL NAA+PROBE: NOT DETECTED

## 2021-10-26 PROCEDURE — U0004 COV-19 TEST NON-CDC HGH THRU: HCPCS | Performed by: PHYSICIAN ASSISTANT

## 2021-10-26 PROCEDURE — C9803 HOPD COVID-19 SPEC COLLECT: HCPCS | Performed by: PHYSICIAN ASSISTANT

## 2021-10-26 PROCEDURE — U0005 INFEC AGEN DETEC AMPLI PROBE: HCPCS | Performed by: PHYSICIAN ASSISTANT

## 2021-11-10 ENCOUNTER — OFFICE VISIT (OUTPATIENT)
Dept: CARDIOLOGY | Facility: CLINIC | Age: 71
End: 2021-11-10

## 2021-11-10 VITALS
WEIGHT: 137 LBS | HEART RATE: 55 BPM | HEIGHT: 64 IN | SYSTOLIC BLOOD PRESSURE: 160 MMHG | DIASTOLIC BLOOD PRESSURE: 80 MMHG | BODY MASS INDEX: 23.39 KG/M2 | OXYGEN SATURATION: 98 %

## 2021-11-10 DIAGNOSIS — I49.3 PVCS (PREMATURE VENTRICULAR CONTRACTIONS): ICD-10-CM

## 2021-11-10 DIAGNOSIS — R00.2 PALPITATIONS: Primary | ICD-10-CM

## 2021-11-10 DIAGNOSIS — C91.10 CLL (CHRONIC LYMPHOCYTIC LEUKEMIA) (HCC): ICD-10-CM

## 2021-11-10 DIAGNOSIS — I51.7 RIGHT VENTRICULAR DILATION: ICD-10-CM

## 2021-11-10 DIAGNOSIS — I10 ESSENTIAL HYPERTENSION: ICD-10-CM

## 2021-11-10 DIAGNOSIS — J98.4 RESTRICTIVE LUNG DISEASE: ICD-10-CM

## 2021-11-10 PROCEDURE — 99214 OFFICE O/P EST MOD 30 MIN: CPT | Performed by: NURSE PRACTITIONER

## 2021-11-10 PROCEDURE — 93000 ELECTROCARDIOGRAM COMPLETE: CPT | Performed by: NURSE PRACTITIONER

## 2021-11-10 NOTE — PROGRESS NOTES
"    Subjective:     Encounter Date:11/10/2021      Patient ID: Liset Wright is a 71 y.o. female with symptomatic PVCs, HTN, leukemia, and HLD.    Chief Complaint: \"no complaints\"  Hypertension  This is a chronic problem. The current episode started more than 1 year ago. The problem has been rapidly improving since onset. The problem is controlled. Pertinent negatives include no chest pain, malaise/fatigue, orthopnea, palpitations, PND or shortness of breath.   Hyperlipidemia  This is a chronic problem. The current episode started more than 1 year ago. The problem is controlled. Recent lipid tests were reviewed and are normal. Pertinent negatives include no chest pain or shortness of breath.   Palpitations   This is a chronic problem. The current episode started more than 1 year ago. The problem occurs intermittently. The problem has been rapidly improving. Pertinent negatives include no chest pain, coughing, dizziness, irregular heartbeat, malaise/fatigue, near-syncope, shortness of breath, syncope or weakness.     Patient presents today for a routine follow up. Patient is followed for symptomatic PVCs that have been well controlled with beta blocker therapy. She had a holter ordered by her PCP in 11/2020 for hypotension that revealed 2 short episodes of PSVT. She also had a 2D echo completed that revealed a normal LVEF, grade 2 diastolic dysfunction, trace-mild MR, mild TR, and mild-moderately dilated RV with normal systolic function. She reports her BP is controlled and her PCP reduced her valsartan to 160 and increased her lopressor to 100mg TID. She reports her her palpitations are essentially absent. She has intermittent shortness of breath with exertion that remains unchanged from previous. She follows with pulmonary for her restrictive lung disease- asthma. She denies chest pain, edema, syncope and near syncope.     The following portions of the patient's history were reviewed and updated as appropriate: " allergies, current medications, past family history, past medical history, past social history, past surgical history and problem list.    Allergies   Allergen Reactions   • Levofloxacin Paresthesia   • Phenergan [Promethazine Hcl] Other (See Comments)     Jerky movements   • Promethazine Unknown - High Severity   • Ramipril Palpitations       Current Outpatient Medications:   •  albuterol sulfate  (90 Base) MCG/ACT inhaler, Inhale 2 puffs Every 4 (Four) Hours As Needed for Wheezing., Disp: , Rfl:   •  dicyclomine (BENTYL) 10 MG capsule, Take 10 mg by mouth 3 (Three) Times a Day., Disp: , Rfl:   •  esomeprazole (nexIUM) 40 MG capsule, Take 1 capsule by mouth 2 (Two) Times a Day. (Patient taking differently: Take 40 mg by mouth Daily.), Disp: 60 capsule, Rfl: 11  •  metoprolol tartrate (LOPRESSOR) 100 MG tablet, Take 100 mg by mouth 3 (Three) Times a Day., Disp: , Rfl:   •  omeprazole (priLOSEC) 40 MG capsule, Take 40 mg by mouth Daily., Disp: , Rfl:   •  valsartan (DIOVAN) 320 MG tablet, Take 160 mg by mouth Daily., Disp: , Rfl:   Past Medical History:   Diagnosis Date   • Acid reflux    • Alpha-1-antitrypsin deficiency (HCC)    • Arrhythmia    • Arthritis    • Asthma    • Cancer (HCC)     leukemia   • Hypertension    • Low back pain    • Palpitations        Social History     Socioeconomic History   • Marital status:    Tobacco Use   • Smoking status: Never Smoker   • Smokeless tobacco: Never Used   Substance and Sexual Activity   • Alcohol use: No   • Drug use: No   • Sexual activity: Defer       Review of Systems   Constitutional: Negative for malaise/fatigue, weight gain and weight loss.   Cardiovascular: Positive for dyspnea on exertion. Negative for chest pain, irregular heartbeat, leg swelling, near-syncope, orthopnea, palpitations, paroxysmal nocturnal dyspnea and syncope.   Respiratory: Negative for cough, shortness of breath, sleep disturbances due to breathing, sputum production and  wheezing.    Skin: Negative for dry skin, flushing, itching and rash.   Gastrointestinal: Negative for hematemesis and hematochezia.   Neurological: Negative for dizziness, light-headedness, loss of balance and weakness.   All other systems reviewed and are negative.         Objective:     Vitals reviewed.   Constitutional:       General: Not in acute distress.     Appearance: Well-developed. Not diaphoretic.   Eyes:      General: No scleral icterus.     Conjunctiva/sclera: Conjunctivae normal.      Pupils: Pupils are equal, round, and reactive to light.   HENT:      Head: Normocephalic.    Mouth/Throat:      Pharynx: No oropharyngeal exudate.   Pulmonary:      Effort: Pulmonary effort is normal. No respiratory distress.      Breath sounds: Normal breath sounds. No wheezing. No rales.   Chest:      Chest wall: Not tender to palpatation.   Cardiovascular:      Normal rate. Regular rhythm.   Pulses:     Intact distal pulses.   Edema:     Peripheral edema absent.   Abdominal:      General: Bowel sounds are normal. There is no distension.      Palpations: Abdomen is soft.      Tenderness: There is no abdominal tenderness.   Musculoskeletal: Normal range of motion.      Cervical back: Normal range of motion and neck supple. Skin:     General: Skin is warm and dry.      Coloration: Skin is not pale.      Findings: No erythema or rash.   Neurological:      Mental Status: Alert and oriented to person, place, and time.      Deep Tendon Reflexes: Reflexes are normal and symmetric.   Psychiatric:         Behavior: Behavior normal.             ECG 12 Lead    Date/Time: 11/10/2021 8:48 AM  Performed by: Caitie Sanchez APRN  Authorized by: Caitie Sanchez APRN   Comparison: compared with previous ECG from 11/5/2020  Similar to previous ECG  Rhythm: sinus bradycardia  Rate: bradycardic  BPM: 55  Conduction: conduction normal  ST Segments: ST segments normal  T Waves: T waves normal  QRS axis: normal  Other: no other  "findings    Clinical impression: normal ECG          /80   Pulse 55   Ht 162.6 cm (64\")   Wt 62.1 kg (137 lb)   SpO2 98%   BMI 23.52 kg/m²     Lab Review:   I have reviewed previous office notes, recent labs and recent cardiac testing.     Lab Results   Component Value Date    CHLPL 183 08/23/2018    TRIG 55 08/23/2018    HDL 83 08/23/2018    LDL 89 08/23/2018     holter 10/22:   Study Impressions    An abnormal monitor study. 2 short asymptomatic runs of PSVT     Results for orders placed in visit on 09/29/20    Adult Transthoracic Echo Complete W/ Cont if Necessary Per Protocol    Interpretation Summary  · Left ventricular wall thickness is consistent with concentric hypertrophy.  · Estimated left ventricular EF = 65% Left ventricular systolic function is normal.  · Left ventricular diastolic function is consistent with (grade II w/high LAP) pseudonormalization.  · The left atrial cavity is mildly dilated.  · Trace to mild mitral valve regurgitation is present.  · Mild tricuspid valve regurgitation is present.  · Estimated right ventricular systolic pressure from tricuspid regurgitation is mildly elevated (35-45 mmHg).  · The right ventricular cavity is mild to moderately dilated.        Assessment:          Diagnosis Plan   1. Palpitations     2. PVCs (premature ventricular contractions)     3. CLL (chronic lymphocytic leukemia) (HCC)     4. Essential hypertension     5. Restrictive lung disease     6. Right ventricular dilation            Plan:       1. Palpitations- stable with lopressor. Holter revealed 2 short PSVT episodes.   2. PVCs- stable. Controlled with lopressor.   3. CLL- stable. Followed by oncology  4. HTN- slightly elevated today. Reports it was controlled at her PCP office last week. Will defer to her PCP for further medication adjustment.   5. Restrictive lung disease- stable. Followed by pulmonary. PFTs to be done next year.   6. RV dilation- mild-moderate. No further workup indicated " at this time.       Follow up in 1 year or sooner if symptoms worsen.     I spent 30 minutes caring for Liset on this date of service. This time includes time spent by me in the following activities:preparing for the visit, reviewing tests, obtaining and/or reviewing a separately obtained history, performing a medically appropriate examination and/or evaluation , documenting information in the medical record, independently interpreting results and communicating that information with the patient/family/caregiver and care coordination

## 2021-12-29 ENCOUNTER — TRANSCRIBE ORDERS (OUTPATIENT)
Dept: ADMINISTRATIVE | Facility: HOSPITAL | Age: 71
End: 2021-12-29

## 2021-12-29 ENCOUNTER — HOSPITAL ENCOUNTER (OUTPATIENT)
Dept: GENERAL RADIOLOGY | Facility: HOSPITAL | Age: 71
Discharge: HOME | End: 2021-12-29
Admitting: INTERNAL MEDICINE

## 2021-12-29 DIAGNOSIS — R07.89 OTHER CHEST PAIN: ICD-10-CM

## 2021-12-29 DIAGNOSIS — R07.89 OTHER CHEST PAIN: Primary | ICD-10-CM

## 2021-12-29 PROCEDURE — 71046 X-RAY EXAM CHEST 2 VIEWS: CPT

## 2022-01-02 NOTE — PROGRESS NOTES
Chief Complaint   Patient presents with   • Nausea     nausea  1 month is feeling better mid stomach pain       PCP: Harry Hastings MD  REFER: No ref. provider found    Subjective     HPI    Liset Wright presents to office with complain of nausea.  No emesis.  Nausea occurring on daily basis x over one month.  Nausea started apx 2 months ago.  Nausea has improved without intervention.  No associated abp  Liset Wright denies experience heartburn, dysphagia.  Denies frequent use of NSAIDs.  No new medications.  No dietary changes.  No shortness of breath.  Follows with cardio, last appointment Nov 2021.  Bowels described as moving without diffiuclty.  No constipation.   Currently maintained on 2 OTC Nexium in the evening and Prilosec in the mornings (she has taken for over one year).  Liset Wright reports stressful October due to  health.    Endoscopy (Dr Kaur) 11/2020-normal      CScope (Dr Kaur) 2020-normal      Past Medical History:   Diagnosis Date   • Acid reflux    • Alpha-1-antitrypsin deficiency (HCC)    • Arrhythmia    • Arthritis    • Asthma    • Cancer (HCC)     leukemia   • Hypertension    • Low back pain    • Palpitations        Past Surgical History:   Procedure Laterality Date   • CARDIAC CATHETERIZATION     • CHOLECYSTECTOMY     • COLONOSCOPY  10/16/2015    normal   • COLONOSCOPY N/A 10/19/2020    Procedure: COLONOSCOPY WITH ANESTHESIA;  Surgeon: Grant Kaur DO;  Location: [facility] ENDOSCOPY;  Service: Gastroenterology;  Laterality: N/A;  pre: screening  post: normal  Harry Hastings MD   • ENDOSCOPY N/A 11/23/2016    normal   • ENDOSCOPY N/A 12/11/2018    Procedure: ESOPHAGOGASTRODUODENOSCOPY WITH ANESTHESIA;  Surgeon: Grant Kaur DO;  Location: [facility] ENDOSCOPY;  Service: Gastroenterology   • ENDOSCOPY N/A 11/25/2020    Procedure: ESOPHAGOGASTRODUODENOSCOPY WITH ANESTHESIA;  Surgeon: Grant Kaur DO;  Location: [facility] ENDOSCOPY;  Service: Gastroenterology;   "Laterality: N/A;  preop; chest pain  postop; normal   PCP Harry Hastings    • HYSTERECTOMY         Outpatient Medications Marked as Taking for the 1/3/22 encounter (Office Visit) with Kalyan Lazcano APRN   Medication Sig Dispense Refill   • albuterol sulfate  (90 Base) MCG/ACT inhaler Inhale 2 puffs Every 4 (Four) Hours As Needed for Wheezing.     • dicyclomine (BENTYL) 10 MG capsule Take 10 mg by mouth 3 (Three) Times a Day.     • esomeprazole (nexIUM) 40 MG capsule Take 1 capsule by mouth 2 (Two) Times a Day. (Patient taking differently: Take 40 mg by mouth Daily.) 60 capsule 11   • metoprolol tartrate (LOPRESSOR) 100 MG tablet Take 100 mg by mouth 3 (Three) Times a Day.     • omeprazole (priLOSEC) 40 MG capsule Take 40 mg by mouth Daily.     • valsartan (DIOVAN) 320 MG tablet Take 160 mg by mouth Daily.         Allergies   Allergen Reactions   • Levofloxacin Paresthesia   • Phenergan [Promethazine Hcl] Other (See Comments)     Jerky movements   • Promethazine Unknown - High Severity   • Ramipril Palpitations       Social History     Socioeconomic History   • Marital status:    Tobacco Use   • Smoking status: Never Smoker   • Smokeless tobacco: Never Used   Substance and Sexual Activity   • Alcohol use: No   • Drug use: No   • Sexual activity: Defer       Review of Systems   Constitutional: Negative for unexpected weight change.   Respiratory: Negative for shortness of breath.    Cardiovascular: Negative for chest pain.   Gastrointestinal: Positive for nausea. Negative for abdominal pain and anal bleeding.       Objective     Vitals:    01/03/22 1003   BP: 140/88   Pulse: 57   Temp: 98 °F (36.7 °C)   SpO2: 98%   Weight: 62.6 kg (138 lb)   Height: 162.6 cm (64\")     Body mass index is 23.69 kg/m².    Physical Exam  Constitutional:       Appearance: Normal appearance. She is well-developed.   Eyes:      General: No scleral icterus.  Cardiovascular:      Rate and Rhythm: Regular rhythm.      Heart " sounds: Normal heart sounds. No murmur heard.      Pulmonary:      Effort: Pulmonary effort is normal. No accessory muscle usage.      Breath sounds: Normal breath sounds.   Abdominal:      General: Bowel sounds are normal. There is no distension.      Palpations: Abdomen is soft. There is no mass.      Tenderness: There is no abdominal tenderness. There is no guarding or rebound.   Skin:     General: Skin is warm and dry.      Coloration: Skin is not jaundiced.   Neurological:      Mental Status: She is alert.   Psychiatric:         Behavior: Behavior is cooperative.         Imaging Results (Most Recent)     None          Body mass index is 23.69 kg/m².    Assessment/Plan     Diagnoses and all orders for this visit:    1. Nausea (Primary)        * Surgery not found *    Due to bid PPI doubtful PUD  ? If symptoms related to anxiety/nerves related to husbands health concerns  No plans on EGD at this time, if symptoms worsen or if Liset Wright changes her mind she will call office to schedule EGD  Continue PPI in am and prior to evening meal  If insurance will cover bid esopmeprazole/bid PPI she will call office for script to be sent to pharmacy    Kalyan Lazcano, YENNY  01/03/22          There are no Patient Instructions on file for this visit.

## 2022-01-03 ENCOUNTER — OFFICE VISIT (OUTPATIENT)
Dept: GASTROENTEROLOGY | Facility: CLINIC | Age: 72
End: 2022-01-03

## 2022-01-03 VITALS
HEIGHT: 64 IN | TEMPERATURE: 98 F | SYSTOLIC BLOOD PRESSURE: 140 MMHG | WEIGHT: 138 LBS | HEART RATE: 57 BPM | BODY MASS INDEX: 23.56 KG/M2 | DIASTOLIC BLOOD PRESSURE: 88 MMHG | OXYGEN SATURATION: 98 %

## 2022-01-03 DIAGNOSIS — R11.0 NAUSEA: Primary | ICD-10-CM

## 2022-01-03 PROCEDURE — 99213 OFFICE O/P EST LOW 20 MIN: CPT | Performed by: NURSE PRACTITIONER

## 2022-02-07 ENCOUNTER — LAB (OUTPATIENT)
Dept: LAB | Facility: HOSPITAL | Age: 72
End: 2022-02-07

## 2022-02-07 DIAGNOSIS — C91.10 CLL (CHRONIC LYMPHOCYTIC LEUKEMIA): ICD-10-CM

## 2022-02-07 LAB
ALBUMIN SERPL-MCNC: 4.5 G/DL (ref 3.5–5.2)
ALBUMIN/GLOB SERPL: 2.3 G/DL
ALP SERPL-CCNC: 106 U/L (ref 39–117)
ALT SERPL W P-5'-P-CCNC: 19 U/L (ref 1–33)
ANION GAP SERPL CALCULATED.3IONS-SCNC: 6 MMOL/L (ref 5–15)
AST SERPL-CCNC: 24 U/L (ref 1–32)
B2 MICROGLOB SERPL-MCNC: 1.7 MG/L (ref 0.8–2.2)
BILIRUB SERPL-MCNC: 0.7 MG/DL (ref 0–1.2)
BUN SERPL-MCNC: 10 MG/DL (ref 8–23)
BUN/CREAT SERPL: 15.2 (ref 7–25)
CALCIUM SPEC-SCNC: 9 MG/DL (ref 8.6–10.5)
CHLORIDE SERPL-SCNC: 104 MMOL/L (ref 98–107)
CO2 SERPL-SCNC: 30 MMOL/L (ref 22–29)
CREAT SERPL-MCNC: 0.66 MG/DL (ref 0.57–1)
DEPRECATED RDW RBC AUTO: 45.4 FL (ref 37–54)
EOSINOPHIL # BLD MANUAL: 0.14 10*3/MM3 (ref 0–0.4)
EOSINOPHIL NFR BLD MANUAL: 1 % (ref 0.3–6.2)
ERYTHROCYTE [DISTWIDTH] IN BLOOD BY AUTOMATED COUNT: 13.1 % (ref 12.3–15.4)
FERRITIN SERPL-MCNC: 133 NG/ML (ref 13–150)
GFR SERPL CREATININE-BSD FRML MDRD: 89 ML/MIN/1.73
GLOBULIN UR ELPH-MCNC: 2 GM/DL
GLUCOSE SERPL-MCNC: 114 MG/DL (ref 65–99)
HCT VFR BLD AUTO: 37.2 % (ref 34–46.6)
HGB BLD-MCNC: 11.7 G/DL (ref 12–15.9)
IGG1 SER-MCNC: 652 MG/DL (ref 700–1600)
IRON 24H UR-MRATE: 87 MCG/DL (ref 37–145)
IRON SATN MFR SERPL: 27 % (ref 20–50)
LDH SERPL-CCNC: 242 U/L (ref 135–214)
LYMPHOCYTES # BLD MANUAL: 10.55 10*3/MM3 (ref 0.7–3.1)
LYMPHOCYTES NFR BLD MANUAL: 2.1 % (ref 5–12)
MCH RBC QN AUTO: 29.7 PG (ref 26.6–33)
MCHC RBC AUTO-ENTMCNC: 31.5 G/DL (ref 31.5–35.7)
MCV RBC AUTO: 94.4 FL (ref 79–97)
MONOCYTES # BLD: 0.29 10*3/MM3 (ref 0.1–0.9)
NEUTROPHILS # BLD AUTO: 2.71 10*3/MM3 (ref 1.7–7)
NEUTROPHILS NFR BLD MANUAL: 19.8 % (ref 42.7–76)
PLAT MORPH BLD: NORMAL
PLATELET # BLD AUTO: 221 10*3/MM3 (ref 140–450)
PMV BLD AUTO: 9.4 FL (ref 6–12)
POIKILOCYTOSIS BLD QL SMEAR: ABNORMAL
POTASSIUM SERPL-SCNC: 4.3 MMOL/L (ref 3.5–5.2)
PROT SERPL-MCNC: 6.5 G/DL (ref 6–8.5)
RBC # BLD AUTO: 3.94 10*6/MM3 (ref 3.77–5.28)
SMUDGE CELLS BLD QL SMEAR: ABNORMAL
SODIUM SERPL-SCNC: 140 MMOL/L (ref 136–145)
STOMATOCYTES BLD QL SMEAR: ABNORMAL
TIBC SERPL-MCNC: 323 MCG/DL (ref 298–536)
TRANSFERRIN SERPL-MCNC: 217 MG/DL (ref 200–360)
VARIANT LYMPHS NFR BLD MANUAL: 6.3 % (ref 0–5)
VARIANT LYMPHS NFR BLD MANUAL: 70.8 % (ref 19.6–45.3)
WBC NRBC COR # BLD: 13.69 10*3/MM3 (ref 3.4–10.8)

## 2022-02-07 PROCEDURE — 85025 COMPLETE CBC W/AUTO DIFF WBC: CPT

## 2022-02-07 PROCEDURE — 83615 LACTATE (LD) (LDH) ENZYME: CPT

## 2022-02-07 PROCEDURE — 36415 COLL VENOUS BLD VENIPUNCTURE: CPT

## 2022-02-07 PROCEDURE — 84466 ASSAY OF TRANSFERRIN: CPT

## 2022-02-07 PROCEDURE — 80053 COMPREHEN METABOLIC PANEL: CPT

## 2022-02-07 PROCEDURE — 83540 ASSAY OF IRON: CPT

## 2022-02-07 PROCEDURE — 85007 BL SMEAR W/DIFF WBC COUNT: CPT

## 2022-02-07 PROCEDURE — 82232 ASSAY OF BETA-2 PROTEIN: CPT

## 2022-02-07 PROCEDURE — 82728 ASSAY OF FERRITIN: CPT

## 2022-02-07 PROCEDURE — 82784 ASSAY IGA/IGD/IGG/IGM EACH: CPT

## 2022-02-08 NOTE — PROGRESS NOTES
[facility] ON[facility] GROUP HEMATOLOGY AND ONCOLOGY  [address]  [facility] [address] [phone]    Patient Name: Liset Wright  Encounter Date: 02/15/2022  YOB: 1950  Patient Number: [id number]       REASON FOR VISIT:  Liset Wright is a 71-year-old female who returns in follow-up of stage 0 chronic lymphocytic leukemia (B-CLL). She is seen 36.5 months since her initial visit on 01/28/2019. She remains in observation. She is here alone.    I have reviewed the HPI and verified with the patient the accuracy of it. No changes to interval history since the information was documented. Jay Cedeno MD 02/15/22     DIAGNOSTIC ABNORMALITIES:   1. On 11/28/2018, labs showed a WBC of 13.9 with 78% lymphocytes (ALC 10.9), ANC 2.4, and was otherwise normal. Hemoglobin 12, hematocrit 36.7, MCV 87, platelets 261,000. CMP was normal in its entirety to include a BUN of 11, creatinine 0.78, GFR 79, calcium 9.2, total protein 6.8, and normal liver enzymes.  2. On 01/11/2019, CT of the abdomen and pelvis with IV contrast at [facility] showed no acute pathology in the abdomen or pelvis (no masses or any abnormalities to account for her left upper quadrant pain with no adenopathy and normal spleen).  3. On 01/16/2019, Ainsley-Barr virus titers showed an IgG level of 239 (0 - 17.9), Ainsley-Barr virus nuclear antigen IgG 118 (0 - 17.9), but IgM of less than 36 and early antigen of less than 9 (indicating prior infection).  4. On 01/10/2019, repeat CBC showed a hemoglobin of 11.3, hematocrit 35.5, MCV 91.3, platelets 249,000, WBC 13.29.   5. On 01/14/2019, blood smear (manual) review showed atypical lymphocytosis, moderate smudge cells present. RBC morphology showed normocytic, normochromic anemia without significant morphologic abnormality. Platelets normal morphology.  6. On 01/17/2019, peripheral blood was sent for flow cytometry ([facility]). This revealed chronic lymphocytic leukemia (54% of total cells). Monoclonal B cells have a CLL immunophenotype and are negative for both CD38 and ZAP-70 associated with standard risk disease. PCR for IGVH and p53 mutation and FISH for CLL may provide additional prognostic information. Virtually all of the B cells are monoclonal and express CD19 (dim), CD20 (dim), CD5, CD23, CD11c, and dim surface kappa light chain. They are negative for CD10, CD38, FMC-7, ZAP-70, and surface lambda light chain.   7. Labs, 01/28/2019: Hemoglobin 13.1, hematocrit 39.5, MCV 90.5, platelets 283,000, WBC 12.2 with 19.3 segs (ANC 2.4), 75.8 lymphocytes (ALC 9.2). CMP normal. GFR 85, calcium 9.9, total protein 7.1, and normal liver enzymes.  (265 - 665). Beta 2 microglobulin 1.3. Serum iron 101, TIBC 333, iron saturation 30%, B12 of 431, folate 15 (each within reference range). Ferritin 23.7 (depressed). HIV screen negative. IgG 742.  8. Bone marrow biopsy/aspirate, 02/01/2019: PERIPHERAL BLOOD: B cell chronic lymphocytic leukemia. BONE MARROW, UNSPECIFIED SITE, SMEARS, CLOT AND BIOPSY: Involved by B cell chronic lymphocytic leukemia (30%). CLL panel: Positive for a deletion of DLEUI, DILEU2 on chromosome 13 at 04 (58,0% of cells), consistent with CLL. Isolated del 1304.3 is associated with a favorable clinical course. Three of the twenty mitotic cells examined were characterized by loss of one copy of the X chromosome and a deletion in the long arm of chromosome 13.   9. Stools OB x 3, 02/25/2019. Negative x 3    PREVIOUS INTERVENTIONS:   1. Observation        Problem List Items Addressed This Visit        Other    CLL (chronic lymphocytic leukemia) (HCC) - Primary    Relevant Orders    Iron and TIBC    Ferritin    CBC & Differential    IgG    Lactate Dehydrogenase    Beta 2 Microglobulin, Serum        Oncology/Hematology History    No history exists.       PAST MEDICAL HISTORY:  ALLERGIES:  Allergies   Allergen  Reactions   • Levofloxacin Paresthesia   • Phenergan [Promethazine Hcl] Other (See Comments)     Jerky movements   • Promethazine Unknown - High Severity   • Ramipril Palpitations     CURRENT MEDICATIONS:  Outpatient Encounter Medications as of 2/15/2022   Medication Sig Dispense Refill   • albuterol sulfate  (90 Base) MCG/ACT inhaler Inhale 2 puffs Every 4 (Four) Hours As Needed for Wheezing.     • dicyclomine (BENTYL) 10 MG capsule Take 10 mg by mouth 3 (Three) Times a Day.     • esomeprazole (nexIUM) 40 MG capsule Take 1 capsule by mouth 2 (Two) Times a Day. (Patient taking differently: Take 40 mg by mouth Daily.) 60 capsule 11   • metoprolol tartrate (LOPRESSOR) 100 MG tablet Take 100 mg by mouth 3 (Three) Times a Day.     • omeprazole (priLOSEC) 40 MG capsule Take 40 mg by mouth Daily.     • valsartan (DIOVAN) 320 MG tablet Take 160 mg by mouth Daily.       No facility-administered encounter medications on file as of 2/15/2022.     ADULT ILLNESSES:   Chronic lymphocytic leukemia ( ICD-10:C91.10 ;Chronic lymphocytic leukemia of B-cell type not having achieved remission   Abdominal pain ( ICD-10:R10.12 ;Left upper quadrant pain   Asthma ( Dr. Johnson; ICD-10:J45.909 ;Unspecified asthma, uncomplicated )   Cervical degenerative disk disease ( x-ray 11/2017; ICD-10:M50.20 ;Other cervical disc displacement, unspecified cervical region )   Erosive gastritis ( Dr. Kaur; ICD-10:K29.60 ;Other gastritis without bleeding )   Gastroesophageal reflux disease ( GERD, Dr. Kaur; ICD-10:K21.9 ;Gastro-esophageal reflux disease without esophagitis )   Gastroparesis ( ICD-10:K31.84 ;Gastroparesis   Hypertension ( ICD-10:I10 ;Essential (primary) hypertension   Irritable bowel syndrome ( ICD-10:K58.9 ;Irritable bowel syndrome without diarrhea   Migraines ( ICD-10:G43.909 ;Migraine, unspecified, not intractable, without status migrainosus   Normocytic anemia ( ICD-10:D64.9 ;Anemia, unspecified   Palpitations ( normal heart  "cath, 02/2003, bilateral ultrasound showed no significant stenosis, 06/2015, stress echo 05/2018 normal; ICD-10:R00.2 ;Palpitations )   Schatzki esophageal ring ( erosive gastritis/gastroesophageal reflux disease, Dr. Kaur; ICD-10:K22.2 ;Esophageal obstruction )  Right hip fracture following a fall, 04/13/2021.  Non-surgical management      SURGERIES:   Cholecystectomy   Cardiac catheterization, 02/2003   Colonoscopy, 2017. \"No polyps.\" 5 years. Dr. Kaur   EGD (upper endoscopy), 2018, normal, Dr. Kaur   Hysterectomy, total      ADULT ILLNESSES:  Patient Active Problem List   Diagnosis Code   • Palpitations R00.2   • PVCs (premature ventricular contractions) I49.3   • Essential hypertension I10   • Epigastric pain R10.13   • CLL (chronic lymphocytic leukemia) (HCC) C91.10   • Bronchitis J40   • Restrictive lung disease J98.4   • Encounter for screening for malignant neoplasm of colon Z12.11   • Right ventricular dilation I51.7   • Chest pain R07.9   • Closed fracture of pelvis with routine healing S32.9XXD   • Degeneration of lumbar or lumbosacral intervertebral disc M51.37   • Non-smoker Z78.9   • Body mass index (BMI) of 23.0 to 23.9 in adult Z68.23   • Gastroesophageal reflux disease K21.9   • Leukemia (HCC) C95.90     SURGERIES:  Past Surgical History:   Procedure Laterality Date   • CARDIAC CATHETERIZATION     • CHOLECYSTECTOMY     • COLONOSCOPY  10/16/2015    normal   • COLONOSCOPY N/A 10/19/2020    Procedure: COLONOSCOPY WITH ANESTHESIA;  Surgeon: Grant Kaur DO;  Location[facility] ENDOSCOPY;  Service: Gastroenterology;  Laterality: N/A;  pre: screening  post: normal  Harry Hastings MD   • ENDOSCOPY N/A 11/23/2016    normal   • ENDOSCOPY N/A 12/11/2018    Procedure: ESOPHAGOGASTRODUODENOSCOPY WITH ANESTHESIA;  Surgeon: Grant Kaur DO;  Location: [facility] ENDOSCOPY;  Service: Gastroenterology   • ENDOSCOPY N/A 11/25/2020    Procedure: ESOPHAGOGASTRODUODENOSCOPY WITH ANESTHESIA;  Surgeon: " Grant Kaur W, DO;  Location: [facility] ENDOSCOPY;  Service: Gastroenterology;  Laterality: N/A;  preop; chest pain  postop; normal   PCP Harry Hastings    • HYSTERECTOMY       HEALTH MAINTENANCE ITEMS:  Health Maintenance Due   Topic Date Due   • TDAP/TD VACCINES (1 - Tdap) Never done   • ZOSTER VACCINE (1 of 2) Never done   • HEPATITIS C SCREENING  Never done   • COVID-19 Vaccine (3 - Pfizer risk 4-dose series) 03/05/2021   • LIPID PANEL  11/06/2021       <no information>  Last Completed Colonoscopy          COLORECTAL CANCER SCREENING (COLONOSCOPY - Every 5 Years) Next due on 10/19/2025    10/19/2020  COLONOSCOPY    10/19/2020  Surgical Procedure: COLONOSCOPY    02/25/2019  Occult Blood X 3, Stool - Stool, Per Rectum    10/16/2015  SCANNED - COLONOSCOPY    06/17/2011  SCANNED - COLONOSCOPY              Immunization History   Administered Date(s) Administered   • COVID-19 (PFIZER) PURPLE CAP 01/15/2021, 02/05/2021   • FLUAD TRI 65YR+ 10/06/2017, 10/09/2018   • Flu Vaccine Quad PF >18YRS 10/01/2018   • FluLaval/Fluarix/Fluzone >6 11/01/2019   • Hep B, Unspecified 01/10/2002, 02/15/2002, 05/16/2002   • Influenza Quad Vaccine (Inpatient) 10/01/2018   • Influenza, Unspecified 11/02/2020   • Pneumococcal Conjugate 13-Valent (PCV13) 10/06/2017   • Pneumococcal Polysaccharide (PPSV23) 10/01/2018     Last Completed Mammogram          MAMMOGRAM (Every 2 Years) Next due on 1/14/2023 01/14/2021  Outside Claim: HC MAMMOGRAM SCREENING BILAT DIGITAL W CAD,CHG SCREENING DIGITAL BREAST TOMOSYNTHESIS BI    12/27/2019  Outside Claim: HC MAMMOGRAM SCREENING BILAT DIGITAL W CAD,CHG SCREENING DIGITAL BREAST TOMOSYNTHESIS BI    12/21/2018  Outside Claim: HC MAMMOGRAM SCREENING BILAT DIGITAL W CAD,CHG SCREENING DIGITAL BREAST TOMOSYNTHESIS BI    11/07/2017  Outside Claim: [place] Scr mammo bi incl cad,CHG SCREENING DIGITAL BREAST TOMOSYNTHESIS BI    11/30/2016  Outside Claim: [place] SCREENINGMAMMOGRAPHYDIGITAL    Only the first 5 history  "entries have been loaded, but more history exists.                  FAMILY HISTORY:  Family History   Problem Relation Age of Onset   • Cancer Mother    • Cancer Father         lung   • Cancer Paternal Grandmother         stomach   • No Known Problems Brother    • No Known Problems Maternal Aunt    • No Known Problems Maternal Uncle    • No Known Problems Paternal Aunt    • No Known Problems Paternal Uncle    • No Known Problems Maternal Grandmother    • No Known Problems Maternal Grandfather    • No Known Problems Paternal Grandfather    • No Known Problems Other    • Colon cancer Neg Hx    • Esophageal cancer Neg Hx    • Asthma Neg Hx    • Diabetes Neg Hx    • Emphysema Neg Hx    • Heart failure Neg Hx    • Hypertension Neg Hx    • Colon polyps Neg Hx      SOCIAL HISTORY:  Social History     Socioeconomic History   • Marital status:    Tobacco Use   • Smoking status: Never Smoker   • Smokeless tobacco: Never Used   Substance and Sexual Activity   • Alcohol use: No   • Drug use: No   • Sexual activity: Defer       REVIEW OF SYSTEMS:  Constitutional:   The patient's appetite is good but energy is variable, some days better than others.  \"I feel just fine.\" She manages her ADLs including chores, errands.  She still drives.  She has no weight changes (had gained 9 pounds at her 3 prior visits) since her last visit. She has no fevers, chills, or drenching night sweats. Her sleep habits seem appropriate.  Ear/Nose/Throat/Mouth:   She reports no ear pains, sinus symptoms, sore throat, nosebleeds, or sore tongue. She has migraine headaches. She denies any hoarseness.   Ocular:   She reports no eye pain, significant change in visual acuity, double vision, or blurry vision.  Respiratory:   Says she is prone to respiratory infections due to alpha 1 antitrypsin deficiency.  She has some exertional dyspnea from possible asthma but no chronic cough, significant shortness of breathing at rest, phlegm production, or " "unexplained chest wall pain. Says prior PFTs suggest \"asthma\".  Has nor needed antibiotics in the interval.  Cardiovascular:   She reports no exertional chest pain, chest pressure, or chest heaviness. She reports no claudication. She reports occasional palpitations but denies symptomatic orthostasis.  Gastrointestinal:   She reports no dysphagia, nausea, vomiting, postprandial abdominal pain, bloating, cramping, change in bowel habits, with dark (OTC iron) discoloration of the stool. She reports no rectal bleeding. She reports occasional but chronic constipation requiring stool softeners, lifelong. She has no diarrhea.  Genitourinary:   She reports no urinary burning, frequency, dribbling, or discoloration. She reports no difficulty controlling her bladder. She has no need to urinate frequently through the night.   Musculoskeletal:          She reports no unexplained arthralgias, myalgias, or nighttime leg cramping.  Says she sustained a right hip fracture following a fall, 04/13/2021.  Non-surgical management.  No pains now.    Extremities:   She reports no trouble with fluid retention or significant leg swelling.  Endocrine:   She reports no problems with excess thirst, excessive urination, vasomotor instability, or unexplained fatigue.  Heme/Lymphatic:   She reports easy bruising but no unexplained bleeding, petechial rashes, or swollen glands.  Skin:   She reports no itching, rashes, or lesions which won't heal.  Neuro:   She reports no loss of consciousness, seizures, fainting spells, or dizziness. She reports no weakness of face, arms, or legs. She has no difficulty with speech. She has no tremors. She admits to occasional paresthesias of the hands.   Psych:   She seems generally satisfied with life. She denies depression. She reports no mood swings.         VITAL SIGNS: /78   Pulse 62   Temp 97.6 °F (36.4 °C)   Resp 16   Ht 162.6 cm (64\")   Wt 63.4 kg (139 lb 12.8 oz)   SpO2 97%   Breastfeeding " No   BMI 24.00 kg/m² Body surface area is 1.68 meters squared.  Pain Score    02/15/22 0857   PainSc: 0-No pain     I have reexamined the patient and the results are consistent with the previously documented exam. Jay Cedeno MD     PHYSICAL EXAMINATION:   General:   She is a pleasant, cooperative, slender but otherwise well-developed, well-nourished, and modestly-kept elderly female who is comfortable at rest. She arrived in the exam room ambulatory. She appears to be her stated age. Her skin color is normal. ECOG 0  Head/Neck:   The patient is anicteric and atraumatic. She is wearing a surgical mask today. The trachea is midline. The neck is supple without evidence of jugular venous distention or cervical adenopathy. Prominent, non-tender right sternoclavicular joint.  Eyes:   The pupils are equal, round, and reactive to light. The extraocular movements are full. There is no scleral jaundice or erythema.   Chest:   The respiratory efforts are normal and unhindered. The chest is clear to auscultation and percussion. There are no wheezes, rhonchi, rales, or asymmetry of breath sounds.  Cardiovascular:   The patient has a regular cardiac rate and rhythm without murmurs, rubs, or gallops. The peripheral pulses are equal and full.  Abdomen:   The belly is soft and flat. There is no rebound or guarding. There is no organomegaly, mass-effect, or tenderness. Bowel sounds are active and of normal character.  Extremities:   There is no evidence of cyanosis, clubbing, or edema.  Rheumatologic:   There is no overt evidence of rheumatoid deformities of the hands. There is no sausaging of the fingers. There is no sign of active synovitis. The gait is normal.  Cutaneous:   There are no overt rashes, disseminated lesions, purpura, or petechiae.   Lymphatics:   There is no evidence of adenopathy in the cervical, supraclavicular, axillary, inguinal, or femoral areas. There is no splenomegaly.  Neurologic:   The patient is  alert, oriented, cooperative, and pleasant. She is appropriately conversant. She ambulated into the exam room without assistance and transferred from chair to exam table unaided. There is no overt dysfunction of the motor, sensory, cerebellar systems.  Psych:   Mood and affect are appropriate for circumstance. Eye contact is appropriate. Normal judgement and decision making.         LABS    Lab Results - Last 18 Months   Lab Units 02/07/22  0832 08/16/21  0916 04/12/21  2124 02/10/21  0856   HEMOGLOBIN g/dL 11.7* 11.8* 11.4* 12.1   HEMATOCRIT % 37.2 36.9 34.4 35.0   MCV fL 94.4 92.0 90.1 90.2   WBC 10*3/mm3 13.69* 13.02* 21.09* 15.68*   RDW % 13.1 13.1 12.9 12.9   MPV fL 9.4 9.8 9.6 9.6   PLATELETS 10*3/mm3 221 241 204 213   NEUTROS ABS 10*3/mm3 2.71 2.86 9.41* 3.84   LYMPHS ABS 10*3/mm3  --   --   --  11.21*   MONOS ABS 10*3/mm3  --   --   --  0.47   EOS ABS 10*3/mm3 0.14 0.26  --  0.09   BASOS ABS 10*3/mm3  --   --   --  0.04   NEUTROPHIL % % 19.8* 22.0* 44.6  --    MONOCYTES % % 2.1* 2.0*  --   --    ATYP LYMPH % % 6.3* 2.0 2.0  --        Lab Results - Last 18 Months   Lab Units 02/07/22  0832 04/12/21  2124 02/10/21  0856   GLUCOSE mg/dL 114* 126* 116*   SODIUM mmol/L 140 137 139   POTASSIUM mmol/L 4.3 3.7 4.2   CO2 mmol/L 30.0* 29.0 30.0*   CHLORIDE mmol/L 104 101 103   ANION GAP mmol/L 6.0 7.0 6.0   CREATININE mg/dL 0.66 0.60 0.69   BUN mg/dL 10 11 10   BUN / CREAT RATIO  15.2 18.3 14.5   CALCIUM mg/dL 9.0 9.3 9.1   EGFR IF NONAFRICN AM mL/min/1.73 89 99 85   ALK PHOS U/L 106  --  105   TOTAL PROTEIN g/dL 6.5  --  6.6   ALT (SGPT) U/L 19  --  15   AST (SGOT) U/L 24  --  22   BILIRUBIN mg/dL 0.7  --  0.7   ALBUMIN g/dL 4.50  --  4.30   GLOBULIN gm/dL 2.0  --  2.3       Lab Results - Last 18 Months   Lab Units 02/07/22  0832 08/16/21  0916 02/10/21  0856   LDH U/L 242* 253* 219*       Lab Results - Last 18 Months   Lab Units 02/07/22  0832 08/16/21  0916 02/10/21  0856   IRON mcg/dL 87 89 67   TIBC mcg/dL 323  310 302   IRON SATURATION % 27 29 22   FERRITIN ng/mL 133.00 116.40 159.30*     I have reviewed the assessment and plan and verified the accuracy of it. No changes to assessment and plan since the information was documented. Jay Cedeno MD 02/15/22     ASSESSMENT:   1. B cell chronic lymphocytic leukemia (B-CLL):   Stage: 0   Tumor Pawlet: Lymphocytosis.   Complications of Tumor: None.   Tumor Status: Untreated.   IVGH and p53 mutations: Pending.   CD38: Negative.   ZAP-70: Negative.   Isolated del 13q14.3 (favorable)   LDH: 242, 2022 (prior: 213 - 599)   B2M: 1.7, 2022 (prior: 1.3 - 2.1)   Ig, 2022 (prior: 615 - 742)  2022- WBC 13.69; ALC 10.55, 2022  (range: WBC 13.0-23.99; ALC 8.64-13.83)  2. Normocytic anemia.    --Repleted ferritin -133, 2022 (from 159, 145.4, 131.5; 53.2; 35; 23).   --Hgb 11.7; MCV 94.4, 2022 (prior: Hgb 11.3 - 13.3; MCV 87 - 92.2)   3. Irritable bowel syndrome. On Bentyl  4. Gastroparesis. On omeprazole  5. Migraines. On Excedrin migraines  6. Cervical degenerative disk disease.  No meds        7.  Alpha 1 antitrypsin deficiency. Dr. Johnson        8.  Palpitations with echo, 2020 showing grade II diastolic dysfunction but EF > 70%.  Dr. Hawk follows        9.  Prominent right sterno-clavicular joint. Asymptomatic  --2020-right sternoclavicular joint x-ray.  Mild arthritic changes of the inferior proximal right clavicle adjacent to the sternoclavicular joint with appropriate alignment.         10. Received COVID # 2 vaccination, 2021           RECOMMENDATIONS:   1.  Apprised of labs from 2022, Stable lymphocytosis, stable anemia, normal platelets, repleted ferritin (> 100) but otherwise repleted iron levels, normal B2M, stable LDH, stable IgG (> 500), stable CMP.  2.  Previously discussed the labs from  and 2019 stable low grade leukocytosis and lymphocytosis (greater than 5000) normal Hgb and normal  "platelets, normal CMP, normal LDH, normal B2M, repleted serum iron, repleted (> 20%) fe sat, low (< 100) ferritin, repleted B12/folate, negative HIV screen, IgG > 500.   3.  Previously discussed the bone marrow biopsy, 02/01 (above). Confirms B-CLL with del 13q14 (favorable)   4.  B- CLL again previously discussed. I had explained that standard therapy has not appreciably altered the nature history of CLL and survival curves demonstrate that CLL is an incurable disease. Randomized studies have failed to show a survival advantage of immediate treatment over delayed treatment for asymptomatic patients with early stage disease. As a result, treatment is generally reserved until the patient has active disease defined as the presence of disease-related symptoms: Weight loss greater than 10%; extreme fatigue; fever greater than 100.5 for 2 weeks with night sweats without evidence of infection (\"B\" symptoms); worsening cytopenias (HGB less than 11; platelets less than 100,000); unexplained recurrent infections; worsening adenopathy, or splenomegaly. She is aware to call should she develop any of these symptoms.    5.  Continue ongoing management per primary care.   6.  Return to the [place] office in 24 weeks with pre-office serum iron, Fe sat, ferritin, CBC and differential, IgG, LDH, B2M.    QUALITY MEASURES:   MEDICAL DECISION MAKING: Moderate Complexity   AMOUNT OF DATA: Limited  RISK OF COMPLICATIONS: Low     I spent 30 minutes caring for Liset on this date of service. This time includes time spent by me in the following activities: preparing for the visit, reviewing tests, performing a medically appropriate examination and/or evaluation, counseling and educating the patient/family/caregiver, ordering medications, tests, or procedures and documenting information in the medical record.       cc: Harry Hastings MD     "

## 2022-02-10 ENCOUNTER — TRANSCRIBE ORDERS (OUTPATIENT)
Dept: ADMINISTRATIVE | Facility: HOSPITAL | Age: 72
End: 2022-02-10

## 2022-02-10 ENCOUNTER — HOSPITAL ENCOUNTER (OUTPATIENT)
Dept: GENERAL RADIOLOGY | Facility: HOSPITAL | Age: 72
Discharge: HOME | End: 2022-02-10
Admitting: PHYSICIAN ASSISTANT

## 2022-02-10 DIAGNOSIS — M79.671 PAIN IN RIGHT FOOT: Primary | ICD-10-CM
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
## 2022-02-10 PROCEDURE — 73620 X-RAY EXAM OF FOOT: CPT

## 2022-02-15 ENCOUNTER — OFFICE VISIT (OUTPATIENT)
Dept: ONCOLOGY | Facility: CLINIC | Age: 72
End: 2022-02-15

## 2022-02-15 VITALS
HEART RATE: 62 BPM | BODY MASS INDEX: 23.87 KG/M2 | OXYGEN SATURATION: 97 % | TEMPERATURE: 97.6 F | WEIGHT: 139.8 LBS | SYSTOLIC BLOOD PRESSURE: 142 MMHG | DIASTOLIC BLOOD PRESSURE: 78 MMHG | RESPIRATION RATE: 16 BRPM | HEIGHT: 64 IN

## 2022-02-15 DIAGNOSIS — C91.10 CLL (CHRONIC LYMPHOCYTIC LEUKEMIA): Primary | ICD-10-CM

## 2022-02-15 PROCEDURE — 99214 OFFICE O/P EST MOD 30 MIN: CPT | Performed by: INTERNAL MEDICINE

## 2022-04-25 ENCOUNTER — HOSPITAL ENCOUNTER (OUTPATIENT)
Dept: GENERAL RADIOLOGY | Facility: HOSPITAL | Age: 72
Discharge: HOME | End: 2022-04-25
Admitting: PHYSICIAN ASSISTANT

## 2022-04-25 ENCOUNTER — TRANSCRIBE ORDERS (OUTPATIENT)
Dept: ADMINISTRATIVE | Facility: HOSPITAL | Age: 72
End: 2022-04-25

## 2022-04-25 DIAGNOSIS — R05.9 COUGH, UNSPECIFIED: Primary | ICD-10-CM

## 2022-04-25 DIAGNOSIS — R05.9 COUGH, UNSPECIFIED: ICD-10-CM

## 2022-04-25 DIAGNOSIS — J01.90 ACUTE SINUSITIS, RECURRENCE NOT SPECIFIED, UNSPECIFIED LOCATION: ICD-10-CM

## 2022-04-25 PROCEDURE — 71046 X-RAY EXAM CHEST 2 VIEWS: CPT

## 2022-04-26 ENCOUNTER — TRANSCRIBE ORDERS (OUTPATIENT)
Dept: ADMINISTRATIVE | Facility: HOSPITAL | Age: 72
End: 2022-04-26

## 2022-04-26 ENCOUNTER — HOSPITAL ENCOUNTER (OUTPATIENT)
Dept: CT IMAGING | Facility: HOSPITAL | Age: 72
Discharge: HOME | End: 2022-04-26
Admitting: INTERNAL MEDICINE

## 2022-04-26 DIAGNOSIS — R91.8 ABNORMALITY OF LUNG ON CHEST X-RAY: Primary | ICD-10-CM

## 2022-04-26 PROCEDURE — 71250 CT THORAX DX C-: CPT

## 2022-04-28 ENCOUNTER — TELEPHONE (OUTPATIENT)
Dept: PULMONOLOGY | Facility: CLINIC | Age: 72
End: 2022-04-28

## 2022-04-28 NOTE — TELEPHONE ENCOUNTER
I received a CT report copied to my inbasket on this patient. I assume it came from Dr. Bates. Can we see if we can get her most recent note. Looking for what she has been given treatment wise. What her symptoms have been. She will need a repeat CT in 3 months. I do not want to order if Dr. Bates has already ordered one. Thank you.

## 2022-04-28 NOTE — TELEPHONE ENCOUNTER
Was given omnicef, last visit 4/25, repeat ct ordered in three months by Harry Hastings. Per anum she will fax last visit

## 2022-07-15 ENCOUNTER — TRANSCRIBE ORDERS (OUTPATIENT)
Dept: ADMINISTRATIVE | Facility: HOSPITAL | Age: 72
End: 2022-07-15

## 2022-07-15 DIAGNOSIS — R91.8 OTHER NONSPECIFIC ABNORMAL FINDING OF LUNG FIELD: Primary | ICD-10-CM

## 2022-07-22 ENCOUNTER — HOSPITAL ENCOUNTER (OUTPATIENT)
Dept: CT IMAGING | Facility: HOSPITAL | Age: 72
Discharge: HOME | End: 2022-07-22
Admitting: INTERNAL MEDICINE

## 2022-07-22 DIAGNOSIS — R91.8 OTHER NONSPECIFIC ABNORMAL FINDING OF LUNG FIELD: ICD-10-CM

## 2022-07-22 PROCEDURE — 71250 CT THORAX DX C-: CPT

## 2022-07-23 ENCOUNTER — NURSE TRIAGE (OUTPATIENT)
Dept: CALL CENTER | Facility: HOSPITAL | Age: 72
End: 2022-07-23

## 2022-07-23 NOTE — TELEPHONE ENCOUNTER
"covid positive; has leukemia    Requests [facility] [address]    No kidney problems, no blood thinners, no statin medications,  she is on BP meds (diovan, lopressor)    Per Dr. Hastings:  While taking paxlovid, decrease BP medication by one half    Order paxlovid, regular strength, not kidney strength; follow manufacturers recommendation for administration.      See in office Tues or Wed      Reason for Disposition  • [1] Prescription refill request for ESSENTIAL medicine (i.e., likelihood of harm to patient if not taken) AND [2] triager unable to refill per department policy    Additional Information  • Negative: New-onset or worsening symptoms, see that guideline  (e.g., diarrhea, runny nose, sore throat)  • Negative: Medicine question not related to refill or renewal  • Negative: Caller requesting information unrelated to medicine  • Negative: [1] Prescription not at pharmacy AND [2] was prescribed by PCP recently (Exception: triager has access to EMR and prescription is recorded there. Go to Home Care and confirm for pharmacy.)    Answer Assessment - Initial Assessment Questions  1. DRUG NAME: \"What medicine do you need to have refilled?\"      paxlovid    2. REFILLS REMAINING: \"How many refills are remaining?\" (Note: The label on the medicine or pill bottle will show how many refills are remaining. If there are no refills remaining, then a renewal may be needed.)      None - new request  3. EXPIRATION DATE: \"What is the expiration date?\" (Note: The label states when the prescription will , and thus can no longer be refilled.)      na  4. PRESCRIBING HCP: \"Who prescribed it?\" Reason: If prescribed by specialist, call should be referred to that group.      [facility]  5. SYMPTOMS: \"Do you have any symptoms?\"      covid positive  6. PREGNANCY: \"Is there any chance that you are pregnant?\" \"When was your last menstrual period?\"      Na    Protocols used: MEDICATION REFILL AND RENEWAL CALL-ADULT-      "

## 2022-08-02 ENCOUNTER — LAB (OUTPATIENT)
Dept: LAB | Facility: HOSPITAL | Age: 72
End: 2022-08-02

## 2022-08-02 DIAGNOSIS — C91.10 CLL (CHRONIC LYMPHOCYTIC LEUKEMIA): ICD-10-CM

## 2022-08-02 LAB
ANISOCYTOSIS BLD QL: ABNORMAL
B2 MICROGLOB SERPL-MCNC: 1.7 MG/L (ref 0.8–2.2)
DEPRECATED RDW RBC AUTO: 47.2 FL (ref 37–54)
ERYTHROCYTE [DISTWIDTH] IN BLOOD BY AUTOMATED COUNT: 13.4 % (ref 12.3–15.4)
FERRITIN SERPL-MCNC: 184.3 NG/ML (ref 13–150)
HCT VFR BLD AUTO: 35.7 % (ref 34–46.6)
HGB BLD-MCNC: 11.2 G/DL (ref 12–15.9)
IGG1 SER-MCNC: 639 MG/DL (ref 700–1600)
IRON 24H UR-MRATE: 90 MCG/DL (ref 37–145)
IRON SATN MFR SERPL: 28 % (ref 20–50)
LDH SERPL-CCNC: 212 U/L (ref 135–214)
LYMPHOCYTES # BLD MANUAL: 13.39 10*3/MM3 (ref 0.7–3.1)
LYMPHOCYTES NFR BLD MANUAL: 0.8 % (ref 5–12)
MACROCYTES BLD QL SMEAR: ABNORMAL
MCH RBC QN AUTO: 29.9 PG (ref 26.6–33)
MCHC RBC AUTO-ENTMCNC: 31.4 G/DL (ref 31.5–35.7)
MCV RBC AUTO: 95.5 FL (ref 79–97)
MONOCYTES # BLD: 0.12 10*3/MM3 (ref 0.1–0.9)
NEUTROPHILS # BLD AUTO: 1.88 10*3/MM3 (ref 1.7–7)
NEUTROPHILS NFR BLD MANUAL: 12.2 % (ref 42.7–76)
PLAT MORPH BLD: NORMAL
PLATELET # BLD AUTO: 277 10*3/MM3 (ref 140–450)
PMV BLD AUTO: 9.3 FL (ref 6–12)
POIKILOCYTOSIS BLD QL SMEAR: ABNORMAL
RBC # BLD AUTO: 3.74 10*6/MM3 (ref 3.77–5.28)
SMUDGE CELLS BLD QL SMEAR: ABNORMAL
TIBC SERPL-MCNC: 326 MCG/DL (ref 298–536)
TRANSFERRIN SERPL-MCNC: 219 MG/DL (ref 200–360)
VARIANT LYMPHS NFR BLD MANUAL: 0.8 % (ref 0–5)
VARIANT LYMPHS NFR BLD MANUAL: 86.2 % (ref 19.6–45.3)
WBC NRBC COR # BLD: 15.39 10*3/MM3 (ref 3.4–10.8)

## 2022-08-02 PROCEDURE — 82784 ASSAY IGA/IGD/IGG/IGM EACH: CPT

## 2022-08-02 PROCEDURE — 83540 ASSAY OF IRON: CPT

## 2022-08-02 PROCEDURE — 83615 LACTATE (LD) (LDH) ENZYME: CPT

## 2022-08-02 PROCEDURE — 84466 ASSAY OF TRANSFERRIN: CPT

## 2022-08-02 PROCEDURE — 85007 BL SMEAR W/DIFF WBC COUNT: CPT

## 2022-08-02 PROCEDURE — 82728 ASSAY OF FERRITIN: CPT

## 2022-08-02 PROCEDURE — 36415 COLL VENOUS BLD VENIPUNCTURE: CPT

## 2022-08-02 PROCEDURE — 82232 ASSAY OF BETA-2 PROTEIN: CPT

## 2022-08-02 PROCEDURE — 85025 COMPLETE CBC W/AUTO DIFF WBC: CPT

## 2022-08-04 NOTE — PROGRESS NOTES
[facility] ON[facility] GROUP HEMATOLOGY AND ONCOLOGY  [address]  [facility] [address] [phone]    Patient Name: Liset Wright  Encounter Date: 08/08/2022  YOB: 1950  Patient Number: [id number]       REASON FOR VISIT:  Liset Wright is a 71-year-old female who returns in follow-up of stage 0 chronic lymphocytic leukemia (B-CLL). She is seen 42.5 months since her initial visit on 01/28/2019. She remains in observation. She is here alone.    I have reviewed the HPI and verified with the patient the accuracy of it. No changes to interval history since the information was documented. Jay Cedeno MD 08/08/22     DIAGNOSTIC ABNORMALITIES:   1. On 11/28/2018, labs showed a WBC of 13.9 with 78% lymphocytes (ALC 10.9), ANC 2.4, and was otherwise normal. Hemoglobin 12, hematocrit 36.7, MCV 87, platelets 261,000. CMP was normal in its entirety to include a BUN of 11, creatinine 0.78, GFR 79, calcium 9.2, total protein 6.8, and normal liver enzymes.  2. On 01/11/2019, CT of the abdomen and pelvis with IV contrast at [facility] showed no acute pathology in the abdomen or pelvis (no masses or any abnormalities to account for her left upper quadrant pain with no adenopathy and normal spleen).  3. On 01/16/2019, Ainsley-Barr virus titers showed an IgG level of 239 (0 - 17.9), Ainsley-Barr virus nuclear antigen IgG 118 (0 - 17.9), but IgM of less than 36 and early antigen of less than 9 (indicating prior infection).  4. On 01/10/2019, repeat CBC showed a hemoglobin of 11.3, hematocrit 35.5, MCV 91.3, platelets 249,000, WBC 13.29.   5. On 01/14/2019, blood smear (manual) review showed atypical lymphocytosis, moderate smudge cells present. RBC morphology showed normocytic, normochromic anemia without significant morphologic abnormality. Platelets normal morphology.  6. On 01/17/2019, peripheral blood was sent for flow cytometry ([facility]). This revealed chronic lymphocytic leukemia (54% of total cells). Monoclonal B cells have a CLL immunophenotype and are negative for both CD38 and ZAP-70 associated with standard risk disease. PCR for IGVH and p53 mutation and FISH for CLL may provide additional prognostic information. Virtually all of the B cells are monoclonal and express CD19 (dim), CD20 (dim), CD5, CD23, CD11c, and dim surface kappa light chain. They are negative for CD10, CD38, FMC-7, ZAP-70, and surface lambda light chain.   7. Labs, 01/28/2019: Hemoglobin 13.1, hematocrit 39.5, MCV 90.5, platelets 283,000, WBC 12.2 with 19.3 segs (ANC 2.4), 75.8 lymphocytes (ALC 9.2). CMP normal. GFR 85, calcium 9.9, total protein 7.1, and normal liver enzymes.  (265 - 665). Beta 2 microglobulin 1.3. Serum iron 101, TIBC 333, iron saturation 30%, B12 of 431, folate 15 (each within reference range). Ferritin 23.7 (depressed). HIV screen negative. IgG 742.  8. Bone marrow biopsy/aspirate, 02/01/2019: PERIPHERAL BLOOD: B cell chronic lymphocytic leukemia. BONE MARROW, UNSPECIFIED SITE, SMEARS, CLOT AND BIOPSY: Involved by B cell chronic lymphocytic leukemia (30%). CLL panel: Positive for a deletion of DLEUI, DILEU2 on chromosome 13 at 04 (58,0% of cells), consistent with CLL. Isolated del 1304.3 is associated with a favorable clinical course. Three of the twenty mitotic cells examined were characterized by loss of one copy of the X chromosome and a deletion in the long arm of chromosome 13.   9. Stools OB x 3, 02/25/2019. Negative x 3    PREVIOUS INTERVENTIONS:   1. Observation        Problem List Items Addressed This Visit        Other    CLL (chronic lymphocytic leukemia) (HCC) - Primary    Relevant Orders    Iron Profile    Ferritin    CBC & Differential    IgG, IgA, IgM    Lactate Dehydrogenase    Beta 2 Microglobulin, Serum        Oncology/Hematology History    No history exists.       PAST MEDICAL HISTORY:  ALLERGIES:  Allergies   Allergen  Reactions   • Levofloxacin Paresthesia   • Phenergan [Promethazine Hcl] Other (See Comments)     Jerky movements   • Promethazine Unknown - High Severity   • Ramipril Palpitations     CURRENT MEDICATIONS:  Outpatient Encounter Medications as of 8/8/2022   Medication Sig Dispense Refill   • albuterol sulfate  (90 Base) MCG/ACT inhaler Inhale 2 puffs Every 4 (Four) Hours As Needed for Wheezing.     • dicyclomine (BENTYL) 10 MG capsule Take 10 mg by mouth 3 (Three) Times a Day.     • esomeprazole (nexIUM) 40 MG capsule Take 1 capsule by mouth 2 (Two) Times a Day. (Patient taking differently: Take 40 mg by mouth Daily.) 60 capsule 11   • metoprolol tartrate (LOPRESSOR) 100 MG tablet Take 100 mg by mouth 3 (Three) Times a Day.     • omeprazole (priLOSEC) 40 MG capsule Take 40 mg by mouth Daily.     • valsartan (DIOVAN) 320 MG tablet Take 160 mg by mouth Daily.       No facility-administered encounter medications on file as of 8/8/2022.     ADULT ILLNESSES:   Chronic lymphocytic leukemia ( ICD-10:C91.10 ;Chronic lymphocytic leukemia of B-cell type not having achieved remission   Abdominal pain ( ICD-10:R10.12 ;Left upper quadrant pain   Asthma ( Dr. Johnson; ICD-10:J45.909 ;Unspecified asthma, uncomplicated )   Cervical degenerative disk disease ( x-ray 11/2017; ICD-10:M50.20 ;Other cervical disc displacement, unspecified cervical region )   Erosive gastritis ( Dr. Kaur; ICD-10:K29.60 ;Other gastritis without bleeding )   Gastroesophageal reflux disease ( GERD, Dr. Kaur; ICD-10:K21.9 ;Gastro-esophageal reflux disease without esophagitis )   Gastroparesis ( ICD-10:K31.84 ;Gastroparesis   Hypertension ( ICD-10:I10 ;Essential (primary) hypertension   Irritable bowel syndrome ( ICD-10:K58.9 ;Irritable bowel syndrome without diarrhea   Migraines ( ICD-10:G43.909 ;Migraine, unspecified, not intractable, without status migrainosus   Normocytic anemia ( ICD-10:D64.9 ;Anemia, unspecified   Palpitations ( normal heart  "cath, 02/2003, bilateral ultrasound showed no significant stenosis, 06/2015, stress echo 05/2018 normal; ICD-10:R00.2 ;Palpitations )   Schatzki esophageal ring ( erosive gastritis/gastroesophageal reflux disease, Dr. Kaur; ICD-10:K22.2 ;Esophageal obstruction )  Right hip fracture following a fall, 04/13/2021.  Non-surgical management      SURGERIES:   Cholecystectomy   Cardiac catheterization, 02/2003   Colonoscopy, 2017. \"No polyps.\" 5 years. Dr. Kaur   EGD (upper endoscopy), 2018, normal, Dr. Kaur   Hysterectomy, total      ADULT ILLNESSES:  Patient Active Problem List   Diagnosis Code   • Palpitations R00.2   • PVCs (premature ventricular contractions) I49.3   • Essential hypertension I10   • Epigastric pain R10.13   • CLL (chronic lymphocytic leukemia) (HCC) C91.10   • Bronchitis J40   • Restrictive lung disease J98.4   • Encounter for screening for malignant neoplasm of colon Z12.11   • Right ventricular dilation I51.7   • Chest pain R07.9   • Closed fracture of pelvis with routine healing S32.9XXD   • Degeneration of lumbar or lumbosacral intervertebral disc M51.37   • Non-smoker Z78.9   • Body mass index (BMI) of 23.0 to 23.9 in adult Z68.23   • Gastroesophageal reflux disease K21.9   • Leukemia (HCC) C95.90     SURGERIES:  Past Surgical History:   Procedure Laterality Date   • CARDIAC CATHETERIZATION     • CHOLECYSTECTOMY     • COLONOSCOPY  10/16/2015    normal   • COLONOSCOPY N/A 10/19/2020    Procedure: COLONOSCOPY WITH ANESTHESIA;  Surgeon: Grant Kaur DO;  Location: [facility] ENDOSCOPY;  Service: Gastroenterology;  Laterality: N/A;  pre: screening  post: normal  Harry Hastings MD   • ENDOSCOPY N/A 11/23/2016    normal   • ENDOSCOPY N/A 12/11/2018    Procedure: ESOPHAGOGASTRODUODENOSCOPY WITH ANESTHESIA;  Surgeon: Grant Kaur DO;  Location: [facility] ENDOSCOPY;  Service: Gastroenterology   • ENDOSCOPY N/A 11/25/2020    Procedure: ESOPHAGOGASTRODUODENOSCOPY WITH ANESTHESIA;  Surgeon: " Grant Kaur W, DO;  Location: [facility] ENDOSCOPY;  Service: Gastroenterology;  Laterality: N/A;  preop; chest pain  postop; normal   PCP Harry Hastings    • HYSTERECTOMY       HEALTH MAINTENANCE ITEMS:  Health Maintenance Due   Topic Date Due   • TDAP/TD VACCINES (1 - Tdap) Never done   • ZOSTER VACCINE (1 of 2) Never done   • HEPATITIS C SCREENING  Never done   • COVID-19 Vaccine (4 - Booster for Pfizer series) 08/11/2022       <no information>  Last Completed Colonoscopy          COLORECTAL CANCER SCREENING (COLONOSCOPY - Every 5 Years) Next due on 10/19/2025    10/19/2020  Surgical Procedure: COLONOSCOPY    10/19/2020  COLONOSCOPY    02/25/2019  Occult Blood X 3, Stool - Stool, Per Rectum    10/16/2015  SCANNED - COLONOSCOPY    06/17/2011  SCANNED - COLONOSCOPY              Immunization History   Administered Date(s) Administered   • COVID-19 (PFIZER) PURPLE CAP 01/15/2021, 02/05/2021   • Covid-19 (Pfizer) Gray Cap 05/19/2022   • FLUAD TRI 65YR+ 10/06/2017, 10/09/2018   • FluLaval/Fluarix/Fluzone >6 11/01/2019   • Fluzone Split Quad (Multi-dose) 10/01/2018   • Hep B, Unspecified 01/10/2002, 02/15/2002, 05/16/2002   • Influenza Quad Vaccine (Inpatient) 10/01/2018   • Influenza, Unspecified 11/02/2020   • Pneumococcal Conjugate 13-Valent (PCV13) 10/06/2017   • Pneumococcal Polysaccharide (PPSV23) 10/01/2018     Last Completed Mammogram          MAMMOGRAM (Every 2 Years) Next due on 1/17/2024 01/17/2022  Outside Claim: HC MAMMOGRAM SCREENING BILAT DIGITAL W CAD,CHG SCREENING DIGITAL BREAST TOMOSYNTHESIS BI    01/14/2021  Outside Claim: HC MAMMOGRAM SCREENING BILAT DIGITAL W CAD,CHG SCREENING DIGITAL BREAST TOMOSYNTHESIS BI    12/27/2019  Outside Claim: HC MAMMOGRAM SCREENING BILAT DIGITAL W CAD,CHG SCREENING DIGITAL BREAST TOMOSYNTHESIS BI    12/21/2018  Outside Claim: HC MAMMOGRAM SCREENING BILAT DIGITAL W CAD,CHG SCREENING DIGITAL BREAST TOMOSYNTHESIS BI    11/07/2017  Outside Claim: CHG SCREENING DIGITAL BREAST  "TOMOSYNTHESIS , MAMMOGRAM SCREENING BILAT DIGITAL W CAD    Only the first 5 history entries have been loaded, but more history exists.                  FAMILY HISTORY:  Family History   Problem Relation Age of Onset   • Cancer Mother    • Cancer Father         lung   • Cancer Paternal Grandmother         stomach   • No Known Problems Brother    • No Known Problems Maternal Aunt    • No Known Problems Maternal Uncle    • No Known Problems Paternal Aunt    • No Known Problems Paternal Uncle    • No Known Problems Maternal Grandmother    • No Known Problems Maternal Grandfather    • No Known Problems Paternal Grandfather    • No Known Problems Other    • Colon cancer Neg Hx    • Esophageal cancer Neg Hx    • Asthma Neg Hx    • Diabetes Neg Hx    • Emphysema Neg Hx    • Heart failure Neg Hx    • Hypertension Neg Hx    • Colon polyps Neg Hx      SOCIAL HISTORY:  Social History     Socioeconomic History   • Marital status:    Tobacco Use   • Smoking status: Never Smoker   • Smokeless tobacco: Never Used   Substance and Sexual Activity   • Alcohol use: No   • Drug use: No   • Sexual activity: Defer       REVIEW OF SYSTEMS:  Constitutional:   The patient's appetite is good but energy is variable, some days better than others.  \"I feel ok.\" She manages her ADLs including chores, errands.  She still drives.  She has lost 6 lb (no weight changes at her prior visit) since her last visit. \"I was sick for 6 weeks last 04/2022.\" She has no fevers, chills, or drenching night sweats. Her sleep habits seem appropriate.  Ear/Nose/Throat/Mouth:   She reports no ear pains, sinus symptoms, sore throat, nosebleeds, or sore tongue. She has migraine headaches. She denies any hoarseness.   Ocular:   She reports no eye pain, significant change in visual acuity, double vision, or blurry vision.  Respiratory:   Says she is prone to respiratory infections due to alpha 1 antitrypsin deficiency.  Most recent pneumonia last 04/2022.  \"It " "took 6 weeks to get over it.\"  She has some exertional dyspnea from possible asthma but no chronic cough, significant shortness of breathing at rest, phlegm production, or unexplained chest wall pain. Says prior PFTs suggest \"asthma\".    Cardiovascular:   She reports no exertional chest pain, chest pressure, or chest heaviness. She reports no claudication. She reports occasional palpitations but denies symptomatic orthostasis.  Gastrointestinal:   She reports no dysphagia, nausea, vomiting, postprandial abdominal pain, bloating, cramping, change in bowel habits, with dark (OTC iron) discoloration of the stool. She reports no rectal bleeding. She reports occasional but chronic constipation requiring stool softeners, lifelong. She has no diarrhea.  Genitourinary:   She reports no urinary burning, frequency, dribbling, or discoloration. She reports no difficulty controlling her bladder. She has no need to urinate frequently through the night.   Musculoskeletal:          She reports no unexplained arthralgias, myalgias, or nighttime leg cramping.  Says she sustained a right hip fracture following a fall, 04/13/2021.  Non-surgical management.  Has only intermittent residual pains, \"sometimes.\".  Extremities:   She reports no trouble with fluid retention or significant leg swelling.  Endocrine:   She reports no problems with excess thirst, excessive urination, vasomotor instability, or unexplained fatigue.  Heme/Lymphatic:   She reports easy bruising but no unexplained bleeding, petechial rashes, or swollen glands.  Skin:   She reports no itching, rashes, or lesions which won't heal.  Neuro:   She reports no loss of consciousness, seizures, fainting spells, or dizziness. She reports no weakness of face, arms, or legs. She has no difficulty with speech. She has no tremors. She admits to occasional paresthesias of the hands.   Psych:   She seems generally satisfied with life. She denies depression. She reports no mood " "swings.         VITAL SIGNS: Pulse 66   Temp 97.8 °F (36.6 °C)   Resp 18   Ht 162.6 cm (64\")   Wt 60.5 kg (133 lb 6.4 oz)   SpO2 99%   Breastfeeding No   BMI 22.90 kg/m² Body surface area is 1.65 meters squared.  Pain Score    08/08/22 1407   PainSc: 0-No pain     I have reviewed the HPI and verified with the patient the accuracy of it. No changes to interval history since the information was documented. Jay Cedeno MD 08/08/22     PHYSICAL EXAMINATION:   General:   She is a pleasant, cooperative, slender but otherwise well-developed, well-nourished, and modestly-kept elderly female who is comfortable at rest. She arrived in the exam room ambulatory. She appears to be her stated age. Her skin color is normal. ECOG 0  Head/Neck:   The patient is anicteric and atraumatic. She is wearing a surgical mask today. The trachea is midline. The neck is supple without evidence of jugular venous distention or cervical adenopathy. Prominent, non-tender right sternoclavicular joint.  Eyes:   The pupils are equal, round, and reactive to light. The extraocular movements are full. There is no scleral jaundice or erythema.   Chest:   The respiratory efforts are normal and unhindered. The chest is clear to auscultation and percussion. There are no wheezes, rhonchi, rales, or asymmetry of breath sounds.  Cardiovascular:   The patient has a regular cardiac rate and rhythm without murmurs, rubs, or gallops. The peripheral pulses are equal and full.  Abdomen:   The belly is soft and flat. There is no rebound or guarding. There is no organomegaly, mass-effect, or tenderness. Bowel sounds are active and of normal character.  Extremities:   There is no evidence of cyanosis, clubbing, or edema.  Rheumatologic:   There is no overt evidence of rheumatoid deformities of the hands. There is no sausaging of the fingers. There is no sign of active synovitis. The gait is normal.  Cutaneous:   There are no overt rashes, disseminated " lesions, purpura, or petechiae.   Lymphatics:   There is no evidence of adenopathy in the cervical, supraclavicular, axillary, inguinal, or femoral areas. There is no splenomegaly.  Neurologic:   The patient is alert, oriented, cooperative, and pleasant. She is appropriately conversant. She ambulated into the exam room without assistance and transferred from chair to exam table unaided. There is no overt dysfunction of the motor, sensory, cerebellar systems.  Psych:   Mood and affect are appropriate for circumstance. Eye contact is appropriate. Normal judgement and decision making.         LABS    Lab Results - Last 18 Months   Lab Units 08/02/22  0743 02/07/22  0832 08/16/21  0916 04/12/21  2124 02/10/21  0856   HEMOGLOBIN g/dL 11.2* 11.7* 11.8* 11.4* 12.1   HEMATOCRIT % 35.7 37.2 36.9 34.4 35.0   MCV fL 95.5 94.4 92.0 90.1 90.2   WBC 10*3/mm3 15.39* 13.69* 13.02* 21.09* 15.68*   RDW % 13.4 13.1 13.1 12.9 12.9   MPV fL 9.3 9.4 9.8 9.6 9.6   PLATELETS 10*3/mm3 277 221 241 204 213   NEUTROS ABS 10*3/mm3 1.88 2.71 2.86 9.41* 3.84   LYMPHS ABS 10*3/mm3  --   --   --   --  11.21*   MONOS ABS 10*3/mm3  --   --   --   --  0.47   EOS ABS 10*3/mm3  --  0.14 0.26  --  0.09   BASOS ABS 10*3/mm3  --   --   --   --  0.04   NEUTROPHIL % % 12.2* 19.8* 22.0* 44.6  --    MONOCYTES % % 0.8* 2.1* 2.0*  --   --    ATYP LYMPH % % 0.8 6.3* 2.0 2.0  --    ANISOCYTOSIS  Mod/2+  --   --   --   --        Lab Results - Last 18 Months   Lab Units 02/07/22  0832 04/12/21  2124 02/10/21  0856   GLUCOSE mg/dL 114* 126* 116*   SODIUM mmol/L 140 137 139   POTASSIUM mmol/L 4.3 3.7 4.2   CO2 mmol/L 30.0* 29.0 30.0*   CHLORIDE mmol/L 104 101 103   ANION GAP mmol/L 6.0 7.0 6.0   CREATININE mg/dL 0.66 0.60 0.69   BUN mg/dL 10 11 10   BUN / CREAT RATIO  15.2 18.3 14.5   CALCIUM mg/dL 9.0 9.3 9.1   EGFR IF NONAFRICN AM mL/min/1.73 89 99 85   ALK PHOS U/L 106  --  105   TOTAL PROTEIN g/dL 6.5  --  6.6   ALT (SGPT) U/L 19  --  15   AST (SGOT) U/L 24  --   22   BILIRUBIN mg/dL 0.7  --  0.7   ALBUMIN g/dL 4.50  --  4.30   GLOBULIN gm/dL 2.0  --  2.3       Lab Results - Last 18 Months   Lab Units 22  0743 22  0832 21  0916 02/10/21  0856   LDH U/L 212 242* 253* 219*       Lab Results - Last 18 Months   Lab Units 22  0743 22  0832 21  0916 02/10/21  0856   IRON mcg/dL 90 87 89 67   TIBC mcg/dL 326 323 310 302   IRON SATURATION % 28 27 29 22   FERRITIN ng/mL 184.30* 133.00 116.40 159.30*       ASSESSMENT:   1. B cell chronic lymphocytic leukemia (B-CLL):   Stage: 0   Tumor [place]: Lymphocytosis.   Complications of Tumor: None.   Tumor Status: Untreated.   IVGH and p53 mutations: Pending.   CD38: Negative.   ZAP-70: Negative.   Isolated del 13q14.3 (favorable)   LDH: 212, 2022 (prior: 213 - 599)   B2M: 1.7, 2022 (prior: 1.3 - 2.1)   Ig, 2022 (prior: 615 - 742)  2022- WBC 15.39; ALC 13.39, 2022  (range: WBC 13.0-23.99; ALC 8.64-13.83)  2. Normocytic anemia.    --Repleted ferritin -133, 2022 (from 159, 145.4, 131.5; 53.2; 35; 23).   --Hgb 11.2; MCV 95.5, 2022 (prior: Hgb 11.3 - 13.3; MCV 87 - 94.4)   3. Irritable bowel syndrome. On Bentyl  4. Gastroparesis. On omeprazole  5. Migraines. On Excedrin migraines  6. Cervical degenerative disk disease.  No meds        7.  Alpha 1 antitrypsin deficiency. Dr. Johnson        8.  Palpitations with echo, 2020 showing grade II diastolic dysfunction but EF > 70%.  Dr. Hawk follows        9.  Prominent right sterno-clavicular joint. Asymptomatic  --2020-right sternoclavicular joint x-ray.  Mild arthritic changes of the inferior proximal right clavicle adjacent to the sternoclavicular joint with appropriate alignment.         10. Received COVID # 4 vaccination, 2022           RECOMMENDATIONS:   1.  Apprised of labs from 2022, Stable lymphocytosis, stable anemia, normal platelets, repleted ferritin (> 100) and otherwise repleted iron  "levels, normal B2M, normal LDH, adequate IgG (prior: > 500).  2.  Previously discussed the labs from 02/21 and 01/28/2019 stable low grade leukocytosis and lymphocytosis (greater than 5000) normal Hgb and normal platelets, normal CMP, normal LDH, normal B2M, repleted serum iron, repleted (> 20%) fe sat, low (< 100) ferritin, repleted B12/folate, negative HIV screen, IgG > 500.   3.  Previously discussed the bone marrow biopsy, 02/01 (above). Confirms B-CLL with del 13q14 (favorable)   4.  B- CLL again previously discussed. I had explained that standard therapy has not appreciably altered the nature history of CLL and survival curves demonstrate that CLL is an incurable disease. Randomized studies have failed to show a survival advantage of immediate treatment over delayed treatment for asymptomatic patients with early stage disease. As a result, treatment is generally reserved until the patient has active disease defined as the presence of disease-related symptoms: Weight loss greater than 10%; extreme fatigue; fever greater than 100.5 for 2 weeks with night sweats without evidence of infection (\"B\" symptoms); worsening cytopenias (HGB less than 11; platelets less than 100,000); unexplained recurrent infections; worsening adenopathy, or splenomegaly. She is aware to call should she develop any of these symptoms.    5.  Continue ongoing management per primary care.   6.  Return to the [place] office in 24 weeks with pre-office serum iron, Fe sat, ferritin, CBC and differential, IgG, LDH, B2M.    QUALITY MEASURES:   MEDICAL DECISION MAKING: Moderate Complexity   AMOUNT OF DATA: Limited to Moderate  RISK OF COMPLICATIONS: Low     I spent 30 minutes caring for Liset on this date of service. This time includes time spent by me in the following activities: preparing for the visit, reviewing tests, performing a medically appropriate examination and/or evaluation, counseling and educating the patient/family/caregiver, " ordering medications, tests, or procedures and documenting information in the medical record.       cc: Harry Hastings MD

## 2022-08-05 NOTE — PROGRESS NOTES
" YENNY Castro  [facility]   Pulmonary and Critical Care  [address] [place] [address]  Phone: [phone]  Fax: [phone]           Chief Complaint  Mild intermittent asthma without complication    Subjective    History of Present Illness     Liset Wright presents to [facility] PULMONARY & CRITICAL CARE MEDICINE   Ms. Wright is a pleasant 71 year old female patient of Dr. Johnson with known Asthma, scarring of lung, CLL, Bronchitis, Grade II diastolic dysfunction, GERD. She is controlled on her acid reflux.  She denies cough, fever, chills, dizziness, swelling. She has mild shortness of breath that occurs at times secondary to over exertion relieved with rest or rescue inhaler.  She finds she uses the rescue inhaler not very often but admits should probably use more. Zyrtec makes her sleepy but does hlep with her sinus allergies.  She was noted to have infiltrates on a CT in April. Follow up CT July 22 showed near complete resolution of peribronchial consolidation and some residual ground glass opacity in the inferior right upper lobe. She states she was sick in April/ May with outpatient antibiotics and steroids shots, antibiotic shots. Then she had a brown recluse bite. She had covid a few weeks ago. Symptom management. She did take a round of antibiotics and Paxlovid.             Objective   Vital Signs:   /70   Pulse 68   Ht 162.6 cm (64\")   Wt 60.9 kg (134 lb 3.2 oz)   SpO2 98%   BMI 23.04 kg/m²     Physical Exam  Vitals reviewed.   Constitutional:       Appearance: Normal appearance.   Cardiovascular:      Rate and Rhythm: Normal rate and regular rhythm.   Pulmonary:      Effort: Pulmonary effort is normal.      Breath sounds: Normal breath sounds.   Neurological:      General: No focal deficit present.      Mental Status: She is alert and oriented to person, place, and time.   Psychiatric:         Mood and Affect: Mood normal.         " Behavior: Behavior normal.          Result Review :  The following data was reviewed by: YENNY Castro on 08/10/2022:    Data reviewed: Radiologic studies CT Chest 7/22/22     My interpretation of imaging:  As in HPI  My interpretation of labs: none   CT Chest Without Contrast Diagnostic (07/22/2022 12:06)      My interpretation of the PFT: no new     Results for orders placed in visit on 07/17/19    Pulmonary Function Test          Assessment and Plan   Diagnoses and all orders for this visit:    1. Mild intermittent asthma without complication (Primary)    2. Scarring of lung    3. GERD without esophagitis    4. Lung nodule    5. Mediastinal lymphadenopathy  Comments:  Borderline on CT July 22, 2022       She is recovered from pneumonia earlier in the year, brown recluse spider bite and most recently Covid. She will continue her albuterol HFA as needed. Follow up in one year with FVL and DLCO in office. She is up to date on vaccinations.         Follow Up   No follow-ups on file.  Patient was given instructions and counseling regarding her condition or for health maintenance advice. Please see specific information pulled into the AVS if appropriate.     YENNY Castro  8/10/2022  09:04 CDT

## 2022-08-08 ENCOUNTER — OFFICE VISIT (OUTPATIENT)
Dept: ONCOLOGY | Facility: CLINIC | Age: 72
End: 2022-08-08

## 2022-08-08 VITALS
HEART RATE: 66 BPM | RESPIRATION RATE: 18 BRPM | TEMPERATURE: 97.8 F | BODY MASS INDEX: 22.77 KG/M2 | HEIGHT: 64 IN | OXYGEN SATURATION: 99 % | WEIGHT: 133.4 LBS

## 2022-08-08 DIAGNOSIS — C91.10 CLL (CHRONIC LYMPHOCYTIC LEUKEMIA): Primary | ICD-10-CM

## 2022-08-08 PROCEDURE — 99214 OFFICE O/P EST MOD 30 MIN: CPT | Performed by: INTERNAL MEDICINE

## 2022-08-10 ENCOUNTER — OFFICE VISIT (OUTPATIENT)
Dept: PULMONOLOGY | Facility: CLINIC | Age: 72
End: 2022-08-10

## 2022-08-10 VITALS
BODY MASS INDEX: 22.91 KG/M2 | OXYGEN SATURATION: 98 % | HEIGHT: 64 IN | WEIGHT: 134.2 LBS | HEART RATE: 68 BPM | DIASTOLIC BLOOD PRESSURE: 70 MMHG | SYSTOLIC BLOOD PRESSURE: 122 MMHG

## 2022-08-10 DIAGNOSIS — R91.1 LUNG NODULE: ICD-10-CM

## 2022-08-10 DIAGNOSIS — J98.4 SCARRING OF LUNG: Chronic | ICD-10-CM

## 2022-08-10 DIAGNOSIS — K21.9 GERD WITHOUT ESOPHAGITIS: ICD-10-CM

## 2022-08-10 DIAGNOSIS — J45.20 MILD INTERMITTENT ASTHMA WITHOUT COMPLICATION: Primary | Chronic | ICD-10-CM

## 2022-08-10 DIAGNOSIS — R59.0 MEDIASTINAL LYMPHADENOPATHY: ICD-10-CM

## 2022-08-10 PROCEDURE — 99214 OFFICE O/P EST MOD 30 MIN: CPT | Performed by: NURSE PRACTITIONER

## 2022-08-10 RX ORDER — ALBUTEROL SULFATE 90 UG/1
2 AEROSOL, METERED RESPIRATORY (INHALATION) EVERY 4 HOURS PRN
Qty: 18 G | Refills: 4 | Status: SHIPPED | OUTPATIENT
Start: 2022-08-10

## 2022-10-26 ENCOUNTER — TELEPHONE (OUTPATIENT)
Dept: GASTROENTEROLOGY | Facility: CLINIC | Age: 72
End: 2022-10-26

## 2022-10-26 NOTE — TELEPHONE ENCOUNTER
Patients last office visit was Jan 2022 with Kalyan - patient called today to let us know she is still having the same issues and was instructed to contact the office to schedule an EGD with Dr. Kaur. Patient is unsure if she needed another office appointment or if we could just schedule the EGD?     Thank you

## 2022-10-27 DIAGNOSIS — K21.9 GASTROESOPHAGEAL REFLUX DISEASE, UNSPECIFIED WHETHER ESOPHAGITIS PRESENT: ICD-10-CM

## 2022-10-27 DIAGNOSIS — R11.0 NAUSEA: Primary | ICD-10-CM

## 2022-10-27 NOTE — TELEPHONE ENCOUNTER
Spoke with patient - Health History is the same.   Scheduled EGD for 11/11/2022 at 7:15am     Need case request.     Thank you

## 2022-11-10 ENCOUNTER — OFFICE VISIT (OUTPATIENT)
Dept: CARDIOLOGY | Facility: CLINIC | Age: 72
End: 2022-11-10

## 2022-11-10 VITALS
SYSTOLIC BLOOD PRESSURE: 142 MMHG | WEIGHT: 135 LBS | HEIGHT: 65 IN | HEART RATE: 56 BPM | OXYGEN SATURATION: 98 % | BODY MASS INDEX: 22.49 KG/M2 | DIASTOLIC BLOOD PRESSURE: 84 MMHG

## 2022-11-10 DIAGNOSIS — I49.3 PVCS (PREMATURE VENTRICULAR CONTRACTIONS): ICD-10-CM

## 2022-11-10 DIAGNOSIS — C91.10 CLL (CHRONIC LYMPHOCYTIC LEUKEMIA): ICD-10-CM

## 2022-11-10 DIAGNOSIS — I51.7 RIGHT VENTRICULAR DILATION: ICD-10-CM

## 2022-11-10 DIAGNOSIS — I10 ESSENTIAL HYPERTENSION: ICD-10-CM

## 2022-11-10 DIAGNOSIS — R00.2 PALPITATIONS: Primary | ICD-10-CM

## 2022-11-10 PROCEDURE — 93000 ELECTROCARDIOGRAM COMPLETE: CPT | Performed by: NURSE PRACTITIONER

## 2022-11-10 PROCEDURE — 99214 OFFICE O/P EST MOD 30 MIN: CPT | Performed by: NURSE PRACTITIONER

## 2022-11-10 NOTE — PROGRESS NOTES
"    Subjective:     Encounter Date:11/10/2022      Patient ID: Liset Wright is a 72 y.o. female     Chief Complaint: \"no complaints\"  Hypertension  This is a chronic problem. The current episode started more than 1 year ago. The problem has been rapidly improving since onset. The problem is controlled. Associated symptoms include palpitations. Pertinent negatives include no chest pain, malaise/fatigue, orthopnea, PND or shortness of breath.   Hyperlipidemia  This is a chronic problem. The current episode started more than 1 year ago. The problem is controlled. Recent lipid tests were reviewed and are normal. Pertinent negatives include no chest pain or shortness of breath.   Palpitations   This is a chronic problem. The current episode started more than 1 year ago. The problem occurs intermittently. The problem has been rapidly improving. Pertinent negatives include no chest pain, coughing, dizziness, irregular heartbeat, malaise/fatigue, near-syncope, shortness of breath, syncope or weakness.   Shortness of Breath  Associated symptoms include leg swelling. Pertinent negatives include no chest pain, orthopnea, PND, rash, sputum production, syncope or wheezing.   Leg Swelling  Pertinent negatives include no chest pain, coughing, rash or weakness.     Patient presents today for a routine follow up. Patient is followed for symptomatic PVCs that have been well controlled with beta blocker therapy. She had a holter ordered by her PCP in 11/2020 for hypotension that revealed 2 short episodes of PSVT. She also had a 2D echo completed that revealed a normal LVEF, grade 2 diastolic dysfunction, trace-mild MR, mild TR, and mild-moderately dilated RV with normal systolic function.     Today she reports she has been well. She notes her palpitations are infrequent however when she does have them they are intense. She notes edema in her ankles and knees that progress through the day and resolves overnight. She denies chest " pain, dyspnea, lightheadedness, dizziness, syncope and near syncope.     The following portions of the patient's history were reviewed and updated as appropriate: allergies, current medications, past family history, past medical history, past social history, past surgical history and problem list.    Allergies   Allergen Reactions   • Levofloxacin Paresthesia   • Phenergan [Promethazine Hcl] Other (See Comments)     Jerky movements   • Promethazine Unknown - High Severity   • Ramipril Palpitations       Current Outpatient Medications:   •  albuterol sulfate  (90 Base) MCG/ACT inhaler, Inhale 2 puffs Every 4 (Four) Hours As Needed for Wheezing., Disp: 18 g, Rfl: 4  •  dicyclomine (BENTYL) 10 MG capsule, Take 1 capsule by mouth 2 (Two) Times a Day., Disp: , Rfl:   •  esomeprazole (nexIUM) 40 MG capsule, Take 1 capsule by mouth 2 (Two) Times a Day. (Patient taking differently: Take 1 capsule by mouth Daily.), Disp: 60 capsule, Rfl: 11  •  metoprolol tartrate (LOPRESSOR) 100 MG tablet, Take 100 mg by mouth 3 (Three) Times a Day., Disp: , Rfl:   •  omeprazole (priLOSEC) 40 MG capsule, Take 40 mg by mouth Daily., Disp: , Rfl:   •  valsartan (DIOVAN) 320 MG tablet, Take 160 mg by mouth Daily., Disp: , Rfl:   Past Medical History:   Diagnosis Date   • Acid reflux    • Alpha-1-antitrypsin deficiency (HCC)    • Arrhythmia    • Arthritis    • Asthma    • Cancer (HCC)     leukemia   • Hypertension    • Low back pain    • Palpitations        Social History     Socioeconomic History   • Marital status:    Tobacco Use   • Smoking status: Never     Passive exposure: Current   • Smokeless tobacco: Never   Vaping Use   • Vaping Use: Never used   Substance and Sexual Activity   • Alcohol use: No   • Drug use: No   • Sexual activity: Defer       Review of Systems   Constitutional: Negative for malaise/fatigue, weight gain and weight loss.   Cardiovascular: Positive for leg swelling and palpitations. Negative for chest  pain, dyspnea on exertion, irregular heartbeat, near-syncope, orthopnea, paroxysmal nocturnal dyspnea and syncope.   Respiratory: Negative for cough, shortness of breath, sleep disturbances due to breathing, sputum production and wheezing.    Skin: Negative for dry skin, flushing, itching and rash.   Gastrointestinal: Negative for hematemesis and hematochezia.   Neurological: Negative for dizziness, light-headedness, loss of balance and weakness.   All other systems reviewed and are negative.         Objective:     Vitals reviewed.   Constitutional:       General: Not in acute distress.     Appearance: Well-developed. Not diaphoretic.   Eyes:      General: No scleral icterus.     Conjunctiva/sclera: Conjunctivae normal.      Pupils: Pupils are equal, round, and reactive to light.   HENT:      Head: Normocephalic.    Mouth/Throat:      Pharynx: No oropharyngeal exudate.   Pulmonary:      Effort: Pulmonary effort is normal. No respiratory distress.      Breath sounds: Normal breath sounds. No wheezing. No rales.   Chest:      Chest wall: Not tender to palpatation.   Cardiovascular:      Bradycardia present. Regular rhythm.   Pulses:     Intact distal pulses.   Edema:     Peripheral edema absent.   Abdominal:      General: Bowel sounds are normal. There is no distension.      Palpations: Abdomen is soft.      Tenderness: There is no abdominal tenderness.   Musculoskeletal: Normal range of motion.      Cervical back: Normal range of motion and neck supple. Skin:     General: Skin is warm and dry.      Coloration: Skin is not pale.      Findings: No erythema or rash.   Neurological:      Mental Status: Alert and oriented to person, place, and time.      Deep Tendon Reflexes: Reflexes are normal and symmetric.   Psychiatric:         Behavior: Behavior normal.           ECG 12 Lead    Date/Time: 11/11/2022 1:43 PM  Performed by: Caitie Sanchez APRN  Authorized by: Caitie Sanchez APRN   Comparison: compared with  "previous ECG from 11/10/2021  Rhythm: sinus bradycardia  Rate: bradycardic  BPM: 56  Conduction: conduction normal  ST Segments: ST segments normal  T Waves: T waves normal  QRS axis: normal  Other: no other findings    Clinical impression: normal ECG          /84 (BP Location: Left arm, Patient Position: Sitting, Cuff Size: Adult)   Pulse 56   Ht 165.1 cm (65\")   Wt 61.2 kg (135 lb)   LMP  (LMP Unknown)   SpO2 98%   BMI 22.47 kg/m²     Lab Review:   I have reviewed previous office notes, recent labs and recent cardiac testing.     Lab Results   Component Value Date    CHLPL 183 08/23/2018    TRIG 55 08/23/2018    HDL 83 08/23/2018    LDL 89 08/23/2018     Holter 10/2020:   Study Impressions    An abnormal monitor study. 2 short asymptomatic runs of PSVT     Results for orders placed in visit on 09/29/20    Adult Transthoracic Echo Complete W/ Cont if Necessary Per Protocol    Interpretation Summary  · Left ventricular wall thickness is consistent with concentric hypertrophy.  · Estimated left ventricular EF = 65% Left ventricular systolic function is normal.  · Left ventricular diastolic function is consistent with (grade II w/high LAP) pseudonormalization.  · The left atrial cavity is mildly dilated.  · Trace to mild mitral valve regurgitation is present.  · Mild tricuspid valve regurgitation is present.  · Estimated right ventricular systolic pressure from tricuspid regurgitation is mildly elevated (35-45 mmHg).  · The right ventricular cavity is mild to moderately dilated.        Assessment:          Diagnosis Plan   1. Palpitations        2. PVCs (premature ventricular contractions)        3. CLL (chronic lymphocytic leukemia) (HCC)        4. Essential hypertension        5. Right ventricular dilation             Plan:       1. Palpitations- stable with lopressor. Holter revealed 2 short asymptomatic PSVT episodes.   2. PVCs- stable. Controlled with lopressor.   3. CLL- stable. Followed by " oncology  4. HTN- controlled. Followed by PCP.   5. Restrictive lung disease- stable. Followed by pulmonary.  6. RV dilation- mild-moderate. No evidence of CHF. No further workup indicated at this time.       Follow up in 1 year or sooner if symptoms worsen.     I spent 30 minutes caring for Liset on this date of service. This time includes time spent by me in the following activities:preparing for the visit, reviewing tests, obtaining and/or reviewing a separately obtained history, performing a medically appropriate examination and/or evaluation , documenting information in the medical record, independently interpreting results and communicating that information with the patient/family/caregiver and care coordination

## 2022-11-11 ENCOUNTER — HOSPITAL ENCOUNTER (OUTPATIENT)
Facility: HOSPITAL | Age: 72
Setting detail: HOSPITAL OUTPATIENT SURGERY
Discharge: HOME | End: 2022-11-11
Attending: INTERNAL MEDICINE | Admitting: INTERNAL MEDICINE

## 2022-11-11 ENCOUNTER — ANESTHESIA EVENT (OUTPATIENT)
Dept: GASTROENTEROLOGY | Facility: HOSPITAL | Age: 72
End: 2022-11-11

## 2022-11-11 ENCOUNTER — ANESTHESIA (OUTPATIENT)
Dept: GASTROENTEROLOGY | Facility: HOSPITAL | Age: 72
End: 2022-11-11

## 2022-11-11 VITALS
DIASTOLIC BLOOD PRESSURE: 59 MMHG | OXYGEN SATURATION: 99 % | SYSTOLIC BLOOD PRESSURE: 154 MMHG | BODY MASS INDEX: 22.82 KG/M2 | HEART RATE: 57 BPM | TEMPERATURE: 97 F | WEIGHT: 137 LBS | RESPIRATION RATE: 22 BRPM | HEIGHT: 65 IN

## 2022-11-11 DIAGNOSIS — R11.0 NAUSEA: ICD-10-CM

## 2022-11-11 PROCEDURE — 25010000002 PROPOFOL 10 MG/ML EMULSION: Performed by: NURSE ANESTHETIST, CERTIFIED REGISTERED

## 2022-11-11 PROCEDURE — 87081 CULTURE SCREEN ONLY: CPT | Performed by: INTERNAL MEDICINE

## 2022-11-11 PROCEDURE — 43239 EGD BIOPSY SINGLE/MULTIPLE: CPT | Performed by: INTERNAL MEDICINE

## 2022-11-11 RX ORDER — SODIUM CHLORIDE 9 MG/ML
500 INJECTION, SOLUTION INTRAVENOUS CONTINUOUS PRN
Status: DISCONTINUED | OUTPATIENT
Start: 2022-11-11 | End: 2022-11-11 | Stop reason: HOSPADM

## 2022-11-11 RX ORDER — LIDOCAINE HYDROCHLORIDE 20 MG/ML
INJECTION, SOLUTION EPIDURAL; INFILTRATION; INTRACAUDAL; PERINEURAL AS NEEDED
Status: DISCONTINUED | OUTPATIENT
Start: 2022-11-11 | End: 2022-11-11 | Stop reason: SURG

## 2022-11-11 RX ORDER — PROPOFOL 10 MG/ML
VIAL (ML) INTRAVENOUS AS NEEDED
Status: DISCONTINUED | OUTPATIENT
Start: 2022-11-11 | End: 2022-11-11 | Stop reason: SURG

## 2022-11-11 RX ORDER — SODIUM CHLORIDE 0.9 % (FLUSH) 0.9 %
10 SYRINGE (ML) INJECTION AS NEEDED
Status: DISCONTINUED | OUTPATIENT
Start: 2022-11-11 | End: 2022-11-11 | Stop reason: HOSPADM

## 2022-11-11 RX ORDER — LIDOCAINE HYDROCHLORIDE 10 MG/ML
0.5 INJECTION, SOLUTION EPIDURAL; INFILTRATION; INTRACAUDAL; PERINEURAL ONCE AS NEEDED
Status: DISCONTINUED | OUTPATIENT
Start: 2022-11-11 | End: 2022-11-11 | Stop reason: HOSPADM

## 2022-11-11 RX ADMIN — PROPOFOL 80 MG: 10 INJECTION, EMULSION INTRAVENOUS at 09:21

## 2022-11-11 RX ADMIN — SODIUM CHLORIDE 500 ML: 9 INJECTION, SOLUTION INTRAVENOUS at 08:40

## 2022-11-11 RX ADMIN — LIDOCAINE HYDROCHLORIDE 100 MG: 20 INJECTION, SOLUTION EPIDURAL; INFILTRATION; INTRACAUDAL; PERINEURAL at 09:21

## 2022-11-11 NOTE — H&P
[facility] Gastroenterology  Pre Procedure History & Physical    Chief Complaint:   Nausea    Subjective     HPI:   Nausea    Past Medical History:   Past Medical History:   Diagnosis Date   • Acid reflux    • Alpha-1-antitrypsin deficiency (HCC)    • Arrhythmia    • Arthritis    • Asthma    • Cancer (HCC)     leukemia   • Hypertension    • Low back pain    • Palpitations        Past Surgical History:  Past Surgical History:   Procedure Laterality Date   • CARDIAC CATHETERIZATION     • CHOLECYSTECTOMY     • COLONOSCOPY  10/16/2015    normal   • COLONOSCOPY N/A 10/19/2020    Procedure: COLONOSCOPY WITH ANESTHESIA;  Surgeon: Grant Kaur DO;  Location: [facility] ENDOSCOPY;  Service: Gastroenterology;  Laterality: N/A;  pre: screening  post: normal  Harry Hastings MD   • ENDOSCOPY N/A 11/23/2016    normal   • ENDOSCOPY N/A 12/11/2018    Procedure: ESOPHAGOGASTRODUODENOSCOPY WITH ANESTHESIA;  Surgeon: Grant Kaur DO;  Location: [facility] ENDOSCOPY;  Service: Gastroenterology   • ENDOSCOPY N/A 11/25/2020    Procedure: ESOPHAGOGASTRODUODENOSCOPY WITH ANESTHESIA;  Surgeon: Grant Kaur DO;  Location: [facility] ENDOSCOPY;  Service: Gastroenterology;  Laterality: N/A;  preop; chest pain  postop; normal   PCP Harry Hastings    • HYSTERECTOMY         Family History:  Family History   Problem Relation Age of Onset   • Cancer Mother    • Cancer Father         lung   • Cancer Paternal Grandmother         stomach   • No Known Problems Brother    • No Known Problems Maternal Aunt    • No Known Problems Maternal Uncle    • No Known Problems Paternal Aunt    • No Known Problems Paternal Uncle    • No Known Problems Maternal Grandmother    • No Known Problems Maternal Grandfather    • No Known Problems Paternal Grandfather    • No Known Problems Other    • Colon cancer Neg Hx    • Esophageal cancer Neg Hx    • Asthma Neg Hx    • Diabetes Neg Hx    • Emphysema Neg Hx    • Heart failure Neg Hx    • Hypertension Neg Hx    •  "Colon polyps Neg Hx        Social History:   reports that she has never smoked. She has been exposed to tobacco smoke. She has never used smokeless tobacco. She reports that she does not drink alcohol and does not use drugs.    Medications:   Prior to Admission medications    Medication Sig Start Date End Date Taking? Authorizing Provider   dicyclomine (BENTYL) 10 MG capsule Take 1 capsule by mouth 2 (Two) Times a Day.   Yes ProviderLa Nena MD   esomeprazole (nexIUM) 40 MG capsule Take 1 capsule by mouth 2 (Two) Times a Day.  Patient taking differently: Take 1 capsule by mouth Daily. 1/24/18  Yes Kalyan Lazcano APRN   metoprolol tartrate (LOPRESSOR) 100 MG tablet Take 100 mg by mouth 3 (Three) Times a Day.   Yes ProviderLa Nena MD   omeprazole (priLOSEC) 40 MG capsule Take 40 mg by mouth Daily. 2/11/20  Yes ProviderLa Nena MD   valsartan (DIOVAN) 320 MG tablet Take 160 mg by mouth Daily.   Yes ProviderLa Nena MD   albuterol sulfate  (90 Base) MCG/ACT inhaler Inhale 2 puffs Every 4 (Four) Hours As Needed for Wheezing. 8/10/22   Lisa Alvarado APRN       Allergies:  Levofloxacin, Phenergan [promethazine hcl], Promethazine, and Ramipril    ROS:    General: Weight stable  Resp: No SOA  Cardiovascular: No CP    Objective     Blood pressure 174/67, pulse 55, temperature 97 °F (36.1 °C), temperature source Temporal, resp. rate 18, height 165.1 cm (65\"), weight 62.1 kg (137 lb), SpO2 98 %, not currently breastfeeding.    Physical Exam   Constitutional: Pt is oriented to person, place, and in no distress.   HENT: Mouth/Throat: Oropharynx is clear.   Cardiovascular: Normal rate, regular rhythm.    Pulmonary/Chest: Effort normal. No respiratory distress. No  wheezes.   Abdominal: Soft. Non-distended.  Skin: Skin is warm and dry.   Psychiatric: Mood, memory, affect and judgment appear normal.     Assessment & Plan     Diagnosis:  [place]    Anticipated Surgical Procedure:  EGD    The " risks, benefits, and alternatives of this procedure have been discussed with the patient or the responsible party- the patient understands and agrees to proceed.

## 2022-11-11 NOTE — ANESTHESIA PREPROCEDURE EVALUATION
Anesthesia Evaluation     Patient summary reviewed   no history of anesthetic complications:  NPO Solid Status: > 8 hours             Airway   Mallampati: II  Dental    (+) partials    Pulmonary - negative pulmonary ROS   Cardiovascular   Exercise tolerance: excellent (>7 METS)    (+) hypertension,       Neuro/Psych- negative ROS  GI/Hepatic/Renal/Endo    (+)  GERD,      Musculoskeletal     Abdominal    Substance History      OB/GYN          Other      history of cancer                    Anesthesia Plan    ASA 2     MAC       Anesthetic plan, risks, benefits, and alternatives have been provided, discussed and informed consent has been obtained with: patient.        CODE STATUS:

## 2022-11-12 LAB — UREASE TISS QL: NEGATIVE

## 2022-12-09 ENCOUNTER — HOSPITAL ENCOUNTER (OUTPATIENT)
Dept: GENERAL RADIOLOGY | Facility: HOSPITAL | Age: 72
Discharge: HOME | End: 2022-12-09
Admitting: INTERNAL MEDICINE

## 2022-12-09 ENCOUNTER — TRANSCRIBE ORDERS (OUTPATIENT)
Dept: ADMINISTRATIVE | Facility: HOSPITAL | Age: 72
End: 2022-12-09

## 2022-12-09 DIAGNOSIS — R05.9 COUGH, UNSPECIFIED TYPE: Primary | ICD-10-CM

## 2022-12-09 DIAGNOSIS — R07.81 PLEURODYNIA: ICD-10-CM

## 2022-12-09 PROCEDURE — 71046 X-RAY EXAM CHEST 2 VIEWS: CPT

## 2022-12-27 ENCOUNTER — OFFICE VISIT (OUTPATIENT)
Dept: PULMONOLOGY | Age: 72
End: 2022-12-27
Payer: MEDICARE

## 2022-12-27 VITALS
TEMPERATURE: 97.6 F | SYSTOLIC BLOOD PRESSURE: 141 MMHG | OXYGEN SATURATION: 98 % | WEIGHT: 137.2 LBS | DIASTOLIC BLOOD PRESSURE: 80 MMHG | BODY MASS INDEX: 22.86 KG/M2 | HEIGHT: 65 IN | HEART RATE: 62 BPM

## 2022-12-27 DIAGNOSIS — K21.9 GASTROESOPHAGEAL REFLUX DISEASE WITHOUT ESOPHAGITIS: ICD-10-CM

## 2022-12-27 DIAGNOSIS — J98.8 RECURRENT RESPIRATORY INFECTION: ICD-10-CM

## 2022-12-27 DIAGNOSIS — C91.10 CLL (CHRONIC LYMPHOCYTIC LEUKEMIA) (HCC): ICD-10-CM

## 2022-12-27 DIAGNOSIS — J98.8 RECURRENT RESPIRATORY INFECTION: Primary | ICD-10-CM

## 2022-12-27 DIAGNOSIS — R76.8 LOW SERUM IGG FOR AGE: ICD-10-CM

## 2022-12-27 DIAGNOSIS — R06.09 DOE (DYSPNEA ON EXERTION): ICD-10-CM

## 2022-12-27 DIAGNOSIS — R05.3 CHRONIC COUGH: ICD-10-CM

## 2022-12-27 LAB
BASOPHILS ABSOLUTE: 0.2 K/UL (ref 0–0.2)
BASOPHILS RELATIVE PERCENT: 1 % (ref 0–1)
EOSINOPHILS ABSOLUTE: 0.19 K/UL (ref 0–0.6)
EOSINOPHILS RELATIVE PERCENT: 1 % (ref 0–5)
HCT VFR BLD CALC: 39.1 % (ref 37–47)
HEMOGLOBIN: 12.3 G/DL (ref 12–16)
HYPOCHROMIA: ABNORMAL
IGG: 602 MG/DL (ref 700–1600)
IGM: 137 MG/DL (ref 40–230)
IMMATURE GRANULOCYTES #: 0.1 K/UL
LYMPHOCYTES ABSOLUTE: 15.1 K/UL (ref 1.1–4.5)
LYMPHOCYTES RELATIVE PERCENT: 79 % (ref 20–40)
MCH RBC QN AUTO: 30.9 PG (ref 27–31)
MCHC RBC AUTO-ENTMCNC: 31.5 G/DL (ref 33–37)
MCV RBC AUTO: 98.2 FL (ref 81–99)
MONOCYTES ABSOLUTE: 0.4 K/UL (ref 0–0.9)
MONOCYTES RELATIVE PERCENT: 2 % (ref 0–10)
NEUTROPHILS ABSOLUTE: 3.2 K/UL (ref 1.5–7.5)
NEUTROPHILS RELATIVE PERCENT: 17 % (ref 50–65)
PDW BLD-RTO: 14.8 % (ref 11.5–14.5)
PLATELET # BLD: 275 K/UL (ref 130–400)
PLATELET SLIDE REVIEW: ADEQUATE
PMV BLD AUTO: 9.2 FL (ref 9.4–12.3)
RBC # BLD: 3.98 M/UL (ref 4.2–5.4)
SMUDGE CELLS: ABNORMAL
WBC # BLD: 19.1 K/UL (ref 4.8–10.8)

## 2022-12-27 PROCEDURE — G8427 DOCREV CUR MEDS BY ELIG CLIN: HCPCS | Performed by: INTERNAL MEDICINE

## 2022-12-27 PROCEDURE — G8400 PT W/DXA NO RESULTS DOC: HCPCS | Performed by: INTERNAL MEDICINE

## 2022-12-27 PROCEDURE — 1090F PRES/ABSN URINE INCON ASSESS: CPT | Performed by: INTERNAL MEDICINE

## 2022-12-27 PROCEDURE — G8484 FLU IMMUNIZE NO ADMIN: HCPCS | Performed by: INTERNAL MEDICINE

## 2022-12-27 PROCEDURE — G8420 CALC BMI NORM PARAMETERS: HCPCS | Performed by: INTERNAL MEDICINE

## 2022-12-27 PROCEDURE — 99204 OFFICE O/P NEW MOD 45 MIN: CPT | Performed by: INTERNAL MEDICINE

## 2022-12-27 PROCEDURE — 4004F PT TOBACCO SCREEN RCVD TLK: CPT | Performed by: INTERNAL MEDICINE

## 2022-12-27 PROCEDURE — 3017F COLORECTAL CA SCREEN DOC REV: CPT | Performed by: INTERNAL MEDICINE

## 2022-12-27 PROCEDURE — 1123F ACP DISCUSS/DSCN MKR DOCD: CPT | Performed by: INTERNAL MEDICINE

## 2022-12-27 RX ORDER — OMEPRAZOLE 40 MG/1
40 CAPSULE, DELAYED RELEASE ORAL 2 TIMES DAILY
Qty: 60 CAPSULE | Refills: 11 | Status: SHIPPED | OUTPATIENT
Start: 2022-12-27

## 2022-12-27 ASSESSMENT — ENCOUNTER SYMPTOMS
ABDOMINAL DISTENTION: 0
BACK PAIN: 0
COUGH: 1
APNEA: 0
ANAL BLEEDING: 0
WHEEZING: 0
SHORTNESS OF BREATH: 1
RHINORRHEA: 0
CHEST TIGHTNESS: 0
ABDOMINAL PAIN: 0

## 2022-12-27 NOTE — PROGRESS NOTES
Pulmonary and Sleep Medicine    Rosenda Ha (:  1950) is a 81 y.o. female,New patient, here for evaluation of the following chief complaint(s):  New Patient (Referred Dr Sravani Flanagan )      Referring physician:  Stephen Zepeda Md  [address] [place] [address]     ASSESSMENT/PLAN:  1. Recurrent respiratory infection  -     CBC with Auto Differential; Future  -     IgE; Future  -     IgG; Future  -     IgM; Future  -     Alpha-1-Antitrypsin w Phenotype; Future  -     CT CHEST WO CONTRAST; Future  -     Full PFT Study With Bronchodilator; Future  2. [place] (dyspnea on exertion)  3. CLL (chronic lymphocytic leukemia) ([facility] [address]  4. 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 Chronic cough  -     CT CHEST WO CONTRAST; Future  6. 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 We will do common variable immune deficiency work-up. Repeat her alpha-1 antitrypsin phenotype levels. CT of the chest to better define the underlying anatomy. We will also obtain pulmonary function testing. In the meantime we will try inhaled corticosteroid long-acting beta agonist combo for possible cough variant asthma although that seems to be less likely. Andrea Young MD, [facility], [facility]    Return in about 4 weeks (around 2023). SUBJECTIVE/OBJECTIVE:  The patient is here for evaluation of possible asthma. She is under the care of pulmonary at [facility].  She had a pulmonary function study long time ago. She was told that she could have asthma. She never smoked. She worked in an office environment mostly.   She recently had a chest x-ray done at [facility] that was unremarkable. She had IgG levels done at that were consistently below the lower limit of normal.  She does use Prilosec and Nexium for GERD. She does use rescue inhaler occasionally. She does not feel it gives her much benefit. She is diagnosed with CLL on no specific treatment at this time. I was asked to see her regarding the above. Denies any specific triggers to her cough. Prior to Visit Medications    Medication Sig Taking? Authorizing Provider   dicyclomine (BENTYL) 10 MG capsule Take 10 mg by mouth 2 times daily Yes Historical Provider, MD   metoprolol (LOPRESSOR) 100 MG tablet Take 100 mg by mouth 2 times daily Yes Historical Provider, MD   valsartan (DIOVAN) 320 MG tablet Take 320 mg by mouth daily Yes Historical Provider, MD   omeprazole (PRILOSEC) 40 MG delayed release capsule Take 40 mg by mouth daily Yes Historical Provider, MD        Review of Systems   Constitutional:  Negative for activity change, appetite change, chills, diaphoresis and fatigue. HENT:  Negative for congestion, dental problem, drooling, ear discharge, postnasal drip and rhinorrhea. Eyes:  Negative for visual disturbance. Respiratory:  Positive for cough and shortness of breath. Negative for apnea, chest tightness and wheezing. Gastrointestinal:  Negative for abdominal distention, abdominal pain and anal bleeding. Endocrine: Negative for cold intolerance, heat intolerance and polydipsia. Genitourinary:  Negative for difficulty urinating, dysuria, enuresis and flank pain. Musculoskeletal:  Negative for arthralgias, back pain and gait problem. Allergic/Immunologic: Negative for environmental allergies. Neurological:  Negative for dizziness, facial asymmetry, light-headedness and headaches. Vitals:    12/27/22 0903   BP: (!) 141/80   Pulse: 62   Temp: 97.6 °F (36.4 °C)   SpO2: 98%     BMI Readings from Last 1 Encounters:   12/27/22 22.83 kg/m²         Physical Exam  Vitals reviewed. Constitutional:       Appearance: Normal appearance. HENT:      Head: Normocephalic and atraumatic. Nose: Nose normal.   Eyes:      Extraocular Movements: Extraocular movements intact. Conjunctiva/sclera: Conjunctivae normal.   Cardiovascular:      Rate and Rhythm: Normal rate and regular rhythm. Heart sounds: No murmur heard. No friction rub. Pulmonary:      Effort: Pulmonary effort is normal. No respiratory distress. Breath sounds: Normal breath sounds. No stridor. No wheezing, rhonchi or rales. Abdominal:      General: There is no distension. Palpations: There is no mass. Tenderness: There is no abdominal tenderness. There is no guarding or rebound. Musculoskeletal:      Cervical back: Normal range of motion and neck supple. Neurological:      Mental Status: She is alert and oriented to person, place, and time. This note was generated using a voice recognition software. Errors in voice recognition may have occurred. An electronic signature was used to authenticate this note.     --Alicia Martínez MD

## 2022-12-30 LAB — IGE: 28 KU/L

## 2022-12-30 RX ORDER — FLUTICASONE FUROATE, UMECLIDINIUM BROMIDE AND VILANTEROL TRIFENATATE 100; 62.5; 25 UG/1; UG/1; UG/1
1 POWDER RESPIRATORY (INHALATION) DAILY
Qty: 1 EACH | Refills: 0 | COMMUNITY
Start: 2022-12-30

## 2022-12-31 LAB
ALPHA-1 ANTITRYPSIN PHENOTYPE: NORMAL
ALPHA-1 ANTITRYPSIN: 125 MG/DL (ref 90–200)

## 2023-01-09 LAB — SARS-COV-2, PCR: NOT DETECTED

## 2023-01-10 ENCOUNTER — HOSPITAL ENCOUNTER (OUTPATIENT)
Dept: PULMONOLOGY | Age: 73
Discharge: HOME | End: 2023-01-10
Payer: MEDICARE

## 2023-01-10 ENCOUNTER — HOSPITAL ENCOUNTER (OUTPATIENT)
Dept: CT IMAGING | Age: 73
Discharge: HOME | End: 2023-01-10
Payer: MEDICARE

## 2023-01-10 VITALS — BODY MASS INDEX: 22.82 KG/M2 | WEIGHT: 137 LBS | OXYGEN SATURATION: 98 % | HEIGHT: 65 IN | HEART RATE: 55 BPM

## 2023-01-10 DIAGNOSIS — J98.8 RECURRENT RESPIRATORY INFECTION: ICD-10-CM

## 2023-01-10 DIAGNOSIS — R05.3 CHRONIC COUGH: ICD-10-CM

## 2023-01-10 PROCEDURE — 94726 PLETHYSMOGRAPHY LUNG VOLUMES: CPT

## 2023-01-10 PROCEDURE — 6360000002 HC RX W HCPCS: Performed by: INTERNAL MEDICINE

## 2023-01-10 PROCEDURE — 71250 CT THORAX DX C-: CPT | Performed by: RADIOLOGY

## 2023-01-10 PROCEDURE — 94060 EVALUATION OF WHEEZING: CPT

## 2023-01-10 PROCEDURE — 94729 DIFFUSING CAPACITY: CPT

## 2023-01-10 PROCEDURE — 71250 CT THORAX DX C-: CPT

## 2023-01-10 RX ORDER — ALBUTEROL SULFATE 2.5 MG/3ML
2.5 SOLUTION RESPIRATORY (INHALATION) EVERY 6 HOURS PRN
Status: DISCONTINUED | OUTPATIENT
Start: 2023-01-10 | End: 2023-01-12 | Stop reason: HOSPADM

## 2023-01-10 RX ADMIN — ALBUTEROL SULFATE 2.5 MG: 2.5 SOLUTION RESPIRATORY (INHALATION) at 09:07

## 2023-01-10 NOTE — PROCEDURES
Media Information    Pulmonary Function Study    Interpretation:    The FVC is mildly reduced. FEV1 is mildly reduced. FEV1/FVC ratio is reduced. After bronchodilator therapy there was no significant improvement in FEV1. Total lung capacity is Normal. Residual volume is Normal.      Diffusing lung capacity when corrected for alveolar volume is Normal.         Impression:    Non specific pulmonary function study.          Ara Lafleur MD, FCCP, [facility]

## 2023-01-16 ENCOUNTER — LAB (OUTPATIENT)
Dept: LAB | Facility: HOSPITAL | Age: 73
End: 2023-01-16
Payer: MEDICARE

## 2023-01-16 DIAGNOSIS — C91.10 CLL (CHRONIC LYMPHOCYTIC LEUKEMIA): ICD-10-CM

## 2023-01-16 LAB
B2 MICROGLOB SERPL-MCNC: 1.6 MG/L (ref 0.8–2.2)
DEPRECATED RDW RBC AUTO: 51.2 FL (ref 37–54)
EOSINOPHIL # BLD MANUAL: 0.18 10*3/MM3 (ref 0–0.4)
EOSINOPHIL NFR BLD MANUAL: 1 % (ref 0.3–6.2)
ERYTHROCYTE [DISTWIDTH] IN BLOOD BY AUTOMATED COUNT: 14.4 % (ref 12.3–15.4)
FERRITIN SERPL-MCNC: 111.9 NG/ML (ref 13–150)
HCT VFR BLD AUTO: 35.7 % (ref 34–46.6)
HGB BLD-MCNC: 11.1 G/DL (ref 12–15.9)
IGA1 MFR SER: 104 MG/DL (ref 70–400)
IGG1 SER-MCNC: 517 MG/DL (ref 700–1600)
IGM SERPL-MCNC: 114 MG/DL (ref 40–230)
IRON 24H UR-MRATE: 78 MCG/DL (ref 37–145)
IRON SATN MFR SERPL: 23 % (ref 20–50)
LDH SERPL-CCNC: 213 U/L (ref 135–214)
LYMPHOCYTES # BLD MANUAL: 11.45 10*3/MM3 (ref 0.7–3.1)
LYMPHOCYTES NFR BLD MANUAL: 1 % (ref 5–12)
MCH RBC QN AUTO: 30.2 PG (ref 26.6–33)
MCHC RBC AUTO-ENTMCNC: 31.1 G/DL (ref 31.5–35.7)
MCV RBC AUTO: 97.3 FL (ref 79–97)
MONOCYTES # BLD: 0.18 10*3/MM3 (ref 0.1–0.9)
NEUTROPHILS # BLD AUTO: 6 10*3/MM3 (ref 1.7–7)
NEUTROPHILS NFR BLD MANUAL: 33.7 % (ref 42.7–76)
PLAT MORPH BLD: NORMAL
PLATELET # BLD AUTO: 241 10*3/MM3 (ref 140–450)
PMV BLD AUTO: 9.2 FL (ref 6–12)
RBC # BLD AUTO: 3.67 10*6/MM3 (ref 3.77–5.28)
RBC MORPH BLD: NORMAL
TIBC SERPL-MCNC: 332 MCG/DL (ref 298–536)
TRANSFERRIN SERPL-MCNC: 223 MG/DL (ref 200–360)
VARIANT LYMPHS NFR BLD MANUAL: 10.2 % (ref 0–5)
VARIANT LYMPHS NFR BLD MANUAL: 54.1 % (ref 19.6–45.3)
WBC MORPH BLD: NORMAL
WBC NRBC COR # BLD: 17.8 10*3/MM3 (ref 3.4–10.8)

## 2023-01-16 PROCEDURE — 84466 ASSAY OF TRANSFERRIN: CPT

## 2023-01-16 PROCEDURE — 82728 ASSAY OF FERRITIN: CPT

## 2023-01-16 PROCEDURE — 85007 BL SMEAR W/DIFF WBC COUNT: CPT

## 2023-01-16 PROCEDURE — 83615 LACTATE (LD) (LDH) ENZYME: CPT

## 2023-01-16 PROCEDURE — 85025 COMPLETE CBC W/AUTO DIFF WBC: CPT

## 2023-01-16 PROCEDURE — 83540 ASSAY OF IRON: CPT

## 2023-01-16 PROCEDURE — 82232 ASSAY OF BETA-2 PROTEIN: CPT

## 2023-01-16 PROCEDURE — 82784 ASSAY IGA/IGD/IGG/IGM EACH: CPT

## 2023-01-16 PROCEDURE — 36415 COLL VENOUS BLD VENIPUNCTURE: CPT

## 2023-01-23 NOTE — PROGRESS NOTES
MGW ONC [facility] GROUP HEMATOLOGY AND ONCOLOGY  [address]  [facility] [address] [phone]    Patient Name: Liset Wright  Encounter Date: 01/27/2023  YOB: 1950  Patient Number: [id number]     REASON FOR VISIT:  Liset Wright is a 72-year-old female who returns in follow-up of stage 0 chronic lymphocytic leukemia (B-CLL). She is seen 48 months since her initial visit on 01/28/2019. She remains in observation. She is here alone.    I have reviewed the HPI and verified with the patient the accuracy of it. No changes to interval history since the information was documented. Jay Cedeno MD 01/27/23     DIAGNOSTIC ABNORMALITIES:   1. On 11/28/2018, labs showed a WBC of 13.9 with 78% lymphocytes (ALC 10.9), ANC 2.4, and was otherwise normal. Hemoglobin 12, hematocrit 36.7, MCV 87, platelets 261,000. CMP was normal in its entirety to include a BUN of 11, creatinine 0.78, GFR 79, calcium 9.2, total protein 6.8, and normal liver enzymes.  2. On 01/11/2019, CT of the abdomen and pelvis with IV contrast at [facility] showed no acute pathology in the abdomen or pelvis (no masses or any abnormalities to account for her left upper quadrant pain with no adenopathy and normal spleen).  3. On 01/16/2019, Ainsley-Barr virus titers showed an IgG level of 239 (0 - 17.9), Ainsley-Barr virus nuclear antigen IgG 118 (0 - 17.9), but IgM of less than 36 and early antigen of less than 9 (indicating prior infection).  4. On 01/10/2019, repeat CBC showed a hemoglobin of 11.3, hematocrit 35.5, MCV 91.3, platelets 249,000, WBC 13.29.   5. On 01/14/2019, blood smear (manual) review showed atypical lymphocytosis, moderate smudge cells present. RBC morphology showed normocytic, normochromic anemia without significant morphologic abnormality. Platelets normal morphology.  6. On 01/17/2019, peripheral blood was sent for flow cytometry ([facility]). This revealed chronic lymphocytic leukemia (54% of total cells). Monoclonal B cells have a CLL immunophenotype and are negative for both CD38 and ZAP-70 associated with standard risk disease. PCR for IGVH and p53 mutation and FISH for CLL may provide additional prognostic information. Virtually all of the B cells are monoclonal and express CD19 (dim), CD20 (dim), CD5, CD23, CD11c, and dim surface kappa light chain. They are negative for CD10, CD38, FMC-7, ZAP-70, and surface lambda light chain.   7. Labs, 01/28/2019: Hemoglobin 13.1, hematocrit 39.5, MCV 90.5, platelets 283,000, WBC 12.2 with 19.3 segs (ANC 2.4), 75.8 lymphocytes (ALC 9.2). CMP normal. GFR 85, calcium 9.9, total protein 7.1, and normal liver enzymes.  (265 - 665). Beta 2 microglobulin 1.3. Serum iron 101, TIBC 333, iron saturation 30%, B12 of 431, folate 15 (each within reference range). Ferritin 23.7 (depressed). HIV screen negative. IgG 742.  8. Bone marrow biopsy/aspirate, 02/01/2019: PERIPHERAL BLOOD: B cell chronic lymphocytic leukemia. BONE MARROW, UNSPECIFIED SITE, SMEARS, CLOT AND BIOPSY: Involved by B cell chronic lymphocytic leukemia (30%). CLL panel: Positive for a deletion of DLEUI, DILEU2 on chromosome 13 at 04 (58,0% of cells), consistent with CLL. Isolated del 1304.3 is associated with a favorable clinical course. Three of the twenty mitotic cells examined were characterized by loss of one copy of the X chromosome and a deletion in the long arm of chromosome 13.   9. Stools OB x 3, 02/25/2019. Negative x 3    PREVIOUS INTERVENTIONS:   1. Observation        Problem List Items Addressed This Visit        Other    CLL (chronic lymphocytic leukemia) (HCC) - Primary    Relevant Orders    CBC & Differential    Comprehensive Metabolic Panel    Beta 2 Microglobulin, Serum    Lactate Dehydrogenase    IgG     Oncology/Hematology History    No history exists.       PAST MEDICAL HISTORY:  ALLERGIES:  Allergies   Allergen  Reactions   • Levofloxacin Paresthesia   • Phenergan [Promethazine Hcl] Other (See Comments)     Jerky movements   • Promethazine Unknown - High Severity   • Ramipril Palpitations     CURRENT MEDICATIONS:  Outpatient Encounter Medications as of 1/27/2023   Medication Sig Dispense Refill   • albuterol sulfate  (90 Base) MCG/ACT inhaler Inhale 2 puffs Every 4 (Four) Hours As Needed for Wheezing. 18 g 4   • dicyclomine (BENTYL) 10 MG capsule Take 1 capsule by mouth 2 (Two) Times a Day.     • esomeprazole (nexIUM) 40 MG capsule Take 1 capsule by mouth 2 (Two) Times a Day. (Patient taking differently: Take 40 mg by mouth Daily.) 60 capsule 11   • metoprolol tartrate (LOPRESSOR) 100 MG tablet Take 100 mg by mouth 3 (Three) Times a Day.     • omeprazole (priLOSEC) 40 MG capsule Take 40 mg by mouth Daily.     • valsartan (DIOVAN) 320 MG tablet Take 160 mg by mouth Daily.       No facility-administered encounter medications on file as of 1/27/2023.     ADULT ILLNESSES:   Chronic lymphocytic leukemia ( ICD-10:C91.10 ;Chronic lymphocytic leukemia of B-cell type not having achieved remission   Abdominal pain ( ICD-10:R10.12 ;Left upper quadrant pain   Asthma ( Dr. Johnson; ICD-10:J45.909 ;Unspecified asthma, uncomplicated )   Cervical degenerative disk disease ( x-ray 11/2017; ICD-10:M50.20 ;Other cervical disc displacement, unspecified cervical region )   Erosive gastritis ( Dr. Kaur; ICD-10:K29.60 ;Other gastritis without bleeding )   Gastroesophageal reflux disease ( GERD, Dr. Kaur; ICD-10:K21.9 ;Gastro-esophageal reflux disease without esophagitis )   Gastroparesis ( ICD-10:K31.84 ;Gastroparesis   Hypertension ( ICD-10:I10 ;Essential (primary) hypertension   Irritable bowel syndrome ( ICD-10:K58.9 ;Irritable bowel syndrome without diarrhea   Migraines ( ICD-10:G43.909 ;Migraine, unspecified, not intractable, without status migrainosus   Normocytic anemia ( ICD-10:D64.9 ;Anemia, unspecified   Palpitations ( normal  "heart cath, 02/2003, bilateral ultrasound showed no significant stenosis, 06/2015, stress echo 05/2018 normal; ICD-10:R00.2 ;Palpitations )   Schatzki esophageal ring ( erosive gastritis/gastroesophageal reflux disease, Dr. Kaur; ICD-10:K22.2 ;Esophageal obstruction )  Right hip fracture following a fall, 04/13/2021.  Non-surgical management      SURGERIES:   Cholecystectomy   Cardiac catheterization, 02/2003   Colonoscopy, 2017. \"No polyps.\" 5 years. Dr. Kaur   EGD (upper endoscopy), 2018, normal, Dr. Kaur   Hysterectomy, total      ADULT ILLNESSES:  Patient Active Problem List   Diagnosis Code   • Palpitations R00.2   • PVCs (premature ventricular contractions) I49.3   • Essential hypertension I10   • Epigastric pain R10.13   • CLL (chronic lymphocytic leukemia) (HCC) C91.10   • Bronchitis J40   • Restrictive lung disease J98.4   • Encounter for screening for malignant neoplasm of colon Z12.11   • Right ventricular dilation I51.7   • Chest pain R07.9   • Closed fracture of pelvis with routine healing S32.9XXD   • Degeneration of lumbar or lumbosacral intervertebral disc M51.37   • Non-smoker Z78.9   • Body mass index (BMI) of 23.0 to 23.9 in adult Z68.23   • Gastroesophageal reflux disease K21.9   • Leukemia (HCC) C95.90   • Nausea R11.0   • Alpha-1-antitrypsin deficiency carrier Z14.8   • Low serum IgG for age R76.8     SURGERIES:  Past Surgical History:   Procedure Laterality Date   • CARDIAC CATHETERIZATION     • CHOLECYSTECTOMY     • COLONOSCOPY  10/16/2015    normal   • COLONOSCOPY N/A 10/19/2020    Procedure: COLONOSCOPY WITH ANESTHESIA;  Surgeon: Grant Kaur DO;  [facility] ENDOSCOPY;  Service: Gastroenterology;  Laterality: N/A;  pre: screening  post: normal  Harry Hastings MD   • ENDOSCOPY N/A 11/23/2016    normal   • ENDOSCOPY N/A 12/11/2018    Procedure: ESOPHAGOGASTRODUODENOSCOPY WITH ANESTHESIA;  Surgeon: Grant Kaur DO;  Location: [facility] ENDOSCOPY;  Service: " Gastroenterology   • ENDOSCOPY N/A 11/25/2020    Procedure: ESOPHAGOGASTRODUODENOSCOPY WITH ANESTHESIA;  Surgeon: Grant Kaur DO;  Location: [facility] ENDOSCOPY;  Service: Gastroenterology;  Laterality: N/A;  preop; chest pain  postop; normal   PCP Harry Hastings    • ENDOSCOPY N/A 11/11/2022    Procedure: ESOPHAGOGASTRODUODENOSCOPY WITH ANESTHESIA;  Surgeon: Grant Kaur DO;  Location: [facility] ENDOSCOPY;  Service: Gastroenterology;  Laterality: N/A;  Pre: Nausea  Post:normal  Harry Hastings MD   • HYSTERECTOMY       HEALTH MAINTENANCE ITEMS:  Health Maintenance Due   Topic Date Due   • TDAP/TD VACCINES (1 - Tdap) Never done   • ZOSTER VACCINE (1 of 2) Never done   • HEPATITIS C SCREENING  Never done   • COVID-19 Vaccine (3 - Pfizer risk series) 03/05/2021   • LIPID PANEL  12/15/2022   • DXA SCAN  01/14/2023       <no information>  Last Completed Colonoscopy          COLORECTAL CANCER SCREENING (COLONOSCOPY - Every 5 Years) Next due on 10/19/2025    10/19/2020  Surgical Procedure: COLONOSCOPY    10/19/2020  COLONOSCOPY    02/25/2019  Occult Blood X 3, Stool - Stool, Per Rectum    10/16/2015  SCANNED - COLONOSCOPY    06/17/2011  SCANNED - COLONOSCOPY              Immunization History   Administered Date(s) Administered   • COVID-19 (PFIZER) PURPLE CAP 01/15/2021, 02/05/2021   • FLUAD TRI 65YR+ 10/06/2017, 10/09/2018   • FluLaval/Fluzone >6mos 11/01/2019   • Fluzone High Dose =>65 Years ([employer] ONLY) 11/11/2021   • Fluzone Quad >6mos (Multi-dose) 10/01/2018   • Hep B, Unspecified 01/10/2002, 02/15/2002, 05/16/2002   • Influenza Quad Vaccine (Inpatient) 10/01/2018   • Influenza, Unspecified 11/02/2020   • Pneumococcal Conjugate 13-Valent (PCV13) 10/06/2017   • Pneumococcal Polysaccharide (PPSV23) 10/01/2018     Last Completed Mammogram          MAMMOGRAM (Every 2 Years) Next due on 1/17/2024 01/17/2022  Outside Claim:  MAMMOGRAM SCREENING BILAT DIGITAL W CAD,CHG SCREENING DIGITAL BREAST TOMOSYNTHESIS BI  "   01/14/2021  Outside Claim: HC MAMMOGRAM SCREENING BILAT DIGITAL W CAD,CHG SCREENING DIGITAL BREAST TOMOSYNTHESIS BI    12/27/2019  Outside Claim: HC MAMMOGRAM SCREENING BILAT DIGITAL W CAD,CHG SCREENING DIGITAL BREAST TOMOSYNTHESIS BI    12/21/2018  Outside Claim: HC MAMMOGRAM SCREENING BILAT DIGITAL W CAD,CHG SCREENING DIGITAL BREAST TOMOSYNTHESIS BI    11/07/2017  Outside Claim: CHG SCREENING DIGITAL BREAST TOMOSYNTHESIS BI,HC MAMMOGRAM SCREENING BILAT DIGITAL W CAD    Only the first 5 history entries have been loaded, but more history exists.                  FAMILY HISTORY:  Family History   Problem Relation Age of Onset   • Cancer Mother    • Cancer Father         lung   • Cancer Paternal Grandmother         stomach   • No Known Problems Brother    • No Known Problems Maternal Aunt    • No Known Problems Maternal Uncle    • No Known Problems Paternal Aunt    • No Known Problems Paternal Uncle    • No Known Problems Maternal Grandmother    • No Known Problems Maternal Grandfather    • No Known Problems Paternal Grandfather    • No Known Problems Other    • Colon cancer Neg Hx    • Esophageal cancer Neg Hx    • Asthma Neg Hx    • Diabetes Neg Hx    • Emphysema Neg Hx    • Heart failure Neg Hx    • Hypertension Neg Hx    • Colon polyps Neg Hx      SOCIAL HISTORY:  Social History     Socioeconomic History   • Marital status:    Tobacco Use   • Smoking status: Never     Passive exposure: Current   • Smokeless tobacco: Never   Vaping Use   • Vaping Use: Never used   Substance and Sexual Activity   • Alcohol use: No   • Drug use: No   • Sexual activity: Defer       REVIEW OF SYSTEMS:  Constitutional:   The patient's appetite is good but energy is variable, some days better than others.  \"I feel ok.\" She manages her ADLs including chores, errands.  She still drives.  She has regained 3 lb (had lost 6 lb at her prior visit) since her last visit.  She has no fevers, chills, or drenching night sweats. Her sleep " "habits seem appropriate.  Ear/Nose/Throat/Mouth:   She reports no ear pains, sinus symptoms, sore throat, nosebleeds, or sore tongue. She has migraine headaches. She denies any hoarseness.   Ocular:   She reports no eye pain, significant change in visual acuity, double vision, or blurry vision.  Respiratory:   Says she is prone to respiratory infections due to alpha 1 antitrypsin deficiency.  Most recent pneumonia last 04/2022.  Again says, \"It took 6 weeks to get over it.\"  She has some exertional dyspnea from possible asthma but no chronic cough, significant shortness of breathing at rest, phlegm production, or unexplained chest wall pain. Says prior PFTs suggest \"asthma\".  Has switched pulmonary docs and now sees [facility] pulmonary  Cardiovascular:   She reports no exertional chest pain, chest pressure, or chest heaviness. She reports no claudication. She reports occasional palpitations but denies symptomatic orthostasis.  Gastrointestinal:   She reports no dysphagia, nausea, vomiting, postprandial abdominal pain, bloating, cramping, change in bowel habits, with dark (OTC iron) discoloration of the stool. She reports no rectal bleeding. She reports occasional but chronic constipation requiring stool softeners, lifelong. She has no diarrhea.  Genitourinary:   She reports no urinary burning, frequency, dribbling, or discoloration. She reports no difficulty controlling her bladder. She has no need to urinate frequently through the night.   Musculoskeletal:          She reports no unexplained arthralgias, myalgias, or nighttime leg cramping.  Says she sustained a right hip fracture following a fall, 04/13/2021.  Non-surgical management.  Has only intermittent residual pains, \"sometimes.\".  Extremities:   She reports no trouble with fluid retention or significant leg swelling.  Endocrine:   She reports no problems with excess thirst, excessive urination, vasomotor instability, or unexplained fatigue.  Heme/Lymphatic: " "  She reports easy bruising but no unexplained bleeding, petechial rashes, or swollen glands.  Skin:   She reports no itching, rashes, or lesions which won't heal.  Neuro:   She reports no loss of consciousness, seizures, fainting spells, or dizziness. She reports no weakness of face, arms, or legs. She has no difficulty with speech. She has no tremors. She admits to occasional paresthesias of the hands.   Psych:   She seems generally satisfied with life. She denies depression. She reports no mood swings.       VITAL SIGNS: /86   Pulse 61   Temp 97.2 °F (36.2 °C)   Resp 18   Ht 165.1 cm (65\")   Wt 61.9 kg (136 lb 8 oz)   LMP  (LMP Unknown)   SpO2 99%   BMI 22.71 kg/m² Body surface area is 1.68 meters squared.  Pain Score    01/27/23 0830   PainSc: 0-No pain     I have reexamined the patient and the results are consistent with the previously documented exam. Jay Cedeno MD     PHYSICAL EXAMINATION:   General:   She is a pleasant, cooperative, slender but otherwise well-developed, well-nourished, and modestly-kept elderly female who is comfortable at rest. She arrived in the exam room ambulatory. She appears to be her stated age. Her skin color is normal. ECOG 0  Head/Neck:   The patient is anicteric and atraumatic. She is wearing a surgical mask today. The trachea is midline. The neck is supple without evidence of jugular venous distention or cervical adenopathy. Prominent, non-tender right sternoclavicular joint.  Eyes:   The pupils are equal, round, and reactive to light. The extraocular movements are full. There is no scleral jaundice or erythema.   Chest:   The respiratory efforts are normal and unhindered. The chest is clear to auscultation and percussion. There are no wheezes, rhonchi, rales, or asymmetry of breath sounds.  Cardiovascular:   The patient has a regular cardiac rate and rhythm without murmurs, rubs, or gallops. The peripheral pulses are equal and full.  Abdomen:   The belly is " soft and flat. There is no rebound or guarding. There is no organomegaly, mass-effect, or tenderness. Bowel sounds are active and of normal character.  Extremities:   There is no evidence of cyanosis, clubbing, or edema.  Rheumatologic:   There is no overt evidence of rheumatoid deformities of the hands. There is no sausaging of the fingers. There is no sign of active synovitis. The gait is normal.  Cutaneous:   There are no overt rashes, disseminated lesions, purpura, or petechiae.   Lymphatics:   There is no evidence of adenopathy in the cervical, supraclavicular, axillary, inguinal, or femoral areas. There is no splenomegaly.  Neurologic:   The patient is alert, oriented, cooperative, and pleasant. She is appropriately conversant. She ambulated into the exam room without assistance and transferred from chair to exam table unaided. There is no overt dysfunction of the motor, sensory, cerebellar systems.  Psych:   Mood and affect are appropriate for circumstance. Eye contact is appropriate. Normal judgement and decision making.         LABS    Lab Results - Last 18 Months   Lab Units 01/16/23  0810 12/27/22  1009 08/02/22  0743 02/07/22  0832 08/16/21  0916   HEMOGLOBIN g/dL 11.1* 12.3 11.2* 11.7* 11.8*   HEMATOCRIT % 35.7 39.1 35.7 37.2 36.9   MCV fL 97.3* 98.2 95.5 94.4 92.0   WBC 10*3/mm3 17.80* 19.1* 15.39* 13.69* 13.02*   RDW % 14.4 14.8* 13.4 13.1 13.1   MPV fL 9.2 9.2* 9.3 9.4 9.8   PLATELETS 10*3/mm3 241 275 277 221 241   NEUTROS ABS 10*3/mm3 6.00 3.2 1.88 2.71 2.86   LYMPHS ABS K/uL  --  15.1*  --   --   --    MONOS ABS K/uL  --  0.40  --   --   --    EOS ABS 10*3/mm3 0.18 0.19  --  0.14 0.26   BASOS ABS K/uL  --  0.20  --   --   --    IMMATURE GRANS (ABS) K/uL  --  0.1  --   --   --    NEUTROPHIL % % 33.7*  --  12.2* 19.8* 22.0*   MONOCYTES % % 1.0*  --  0.8* 2.1* 2.0*   ATYP LYMPH % % 10.2*  --  0.8 6.3* 2.0   ANISOCYTOSIS   --   --  Mod/2+  --   --        Lab Results - Last 18 Months   Lab Units  22  0832   GLUCOSE mg/dL 114*   SODIUM mmol/L 140   POTASSIUM mmol/L 4.3   CO2 mmol/L 30.0*   CHLORIDE mmol/L 104   ANION GAP mmol/L 6.0   CREATININE mg/dL 0.66   BUN mg/dL 10   BUN / CREAT RATIO  15.2   CALCIUM mg/dL 9.0   EGFR IF NONAFRICN AM mL/min/1.73 89   ALK PHOS U/L 106   TOTAL PROTEIN g/dL 6.5   ALT (SGPT) U/L 19   AST (SGOT) U/L 24   BILIRUBIN mg/dL 0.7   ALBUMIN g/dL 4.50   GLOBULIN gm/dL 2.0       Lab Results - Last 18 Months   Lab Units 23  0810 22  0743 22  0832 21  0916   LDH U/L 213 212 242* 253*       Lab Results - Last 18 Months   Lab Units 23  0810 22  0743 22  0832 21  0916   IRON mcg/dL 78 90 87 89   TIBC mcg/dL 332 326 323 310   IRON SATURATION % 23 28 27 29   FERRITIN ng/mL 111.90 184.30* 133.00 116.40       ASSESSMENT:   1. B cell chronic lymphocytic leukemia (B-CLL):   Stage: 0   Tumor [place]: Lymphocytosis.   Complications of Tumor: None.   Tumor Status: Untreated.   IVGH and p53 mutations: Pending.   CD38: Negative.   ZAP-70: Negative.   Isolated del 13q14.3 (favorable)   LDH: 213, 2023 (prior: 213 - 599)   B2M: 1.7, 2022 (prior: 1.3 - 2.1)   Ig, 2022 (prior: 615 - 742)  2023- WBC 17.8; ALC 11.45  (range: WBC 13.0-23.99; ALC 8.64-13.83)  2. Normocytic anemia.    --Repleted ferritin -133, 2022 (from 159, 145.4, 131.5; 53.2; 35; 23).   --Hgb 11.1; MCV 97.3, 2023 (prior: Hgb 11.3 - 13.3; MCV 87 - 98.2)   3. Irritable bowel syndrome. On Bentyl  4. Gastroparesis. On omeprazole  5. Migraines. On Excedrin migraines  6. Cervical degenerative disk disease.  No meds        7.  Alpha 1 antitrypsin deficiency. Dr. Johnson.  Now followed by Dr. Vasquez        8.  Palpitations with echo, 2020 showing grade II diastolic dysfunction but EF > 70%.  Dr. Hawk follows        9.  Prominent right sterno-clavicular joint. Asymptomatic         --2020-right sternoclavicular joint x-ray.  Mild arthritic  changes of the inferior proximal right clavicle adjacent to the sternoclavicular joint with appropriate alignment.         10. Received COVID # 4 vaccination, 05/2022       11. Recurrent pulmonary infections.  Says she has transferred pulmonary doctor - now sees pulmonary in [facility]- Dr. Russell  --1/24/2023- Visit with Dr. 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 She reports that recently she had again symptoms of upper respiratory tract infection. Her CT of the chest showed areas of groundglass opacities that are scattered and calcified granulomas.  The patient's recurrent upper respiratory infections symptoms could be related to common variable immune deficiency secondary to low IgG versus immune deficiency secondary to CLL or both. She might benefit from a trial of IVIG. We will proceed with IVIG.        RECOMMENDATIONS:   1.   Apprised of labs from 01/16/2023, Stable lymphocytosis, stable anemia, normal platelets, repleted ferritin (> 100) and otherwise repleted iron levels, normal B2M, normal LDH, adequate IgG (prior: > 500).    2.  Visit with Dr. Vasquez, 1/24 (above) noted.  IVIG trial planned  3.  Previously discussed the labs from 02/21 and 01/28/2019 stable low grade leukocytosis and lymphocytosis (greater than 5000) normal Hgb and normal platelets, normal CMP, normal LDH, normal B2M, repleted serum iron, repleted (> 20%) fe sat, low (< 100) ferritin, repleted B12/folate, negative HIV screen, IgG > 500.   4.  Previously discussed the bone marrow biopsy, 02/01 (above). Confirms B-CLL with del 13q14 (favorable)   5.  B- CLL again previously discussed. I had explained that standard therapy has not appreciably altered the nature history of CLL and survival curves demonstrate that CLL is an incurable disease. Randomized studies have failed to show a survival advantage of immediate treatment over delayed treatment for asymptomatic patients with early stage  "disease. As a result, treatment is generally reserved until the patient has active disease defined as the presence of disease-related symptoms: Weight loss greater than 10%; extreme fatigue; fever greater than 100.5 for 2 weeks with night sweats without evidence of infection (\"B\" symptoms); worsening cytopenias (HGB less than 11; platelets less than 100,000); unexplained recurrent infections; worsening adenopathy, or splenomegaly. She is aware to call should she develop any of these symptoms.    6.  Continue ongoing management per primary care.   7.  Return to the [place] office in 24 weeks with pre-office serum iron, Fe sat, ferritin, CBC and differential, IgG, LDH, B2M.    QUALITY MEASURES:   MEDICAL DECISION MAKING: Moderate Complexity   AMOUNT OF DATA:  Moderate    I spent 31 minutes caring for Liset on this date of service. This time includes time spent by me in the following activities: preparing for the visit, reviewing tests, performing a medically appropriate examination and/or evaluation, counseling and educating the patient/family/caregiver, ordering medications, tests, or procedures and documenting information in the medical record.       cc: Harry Hastings MD     "

## 2023-01-24 ENCOUNTER — OFFICE VISIT (OUTPATIENT)
Dept: PULMONOLOGY | Age: 73
End: 2023-01-24
Payer: MEDICARE

## 2023-01-24 VITALS
OXYGEN SATURATION: 99 % | HEIGHT: 65 IN | BODY MASS INDEX: 22.92 KG/M2 | SYSTOLIC BLOOD PRESSURE: 120 MMHG | WEIGHT: 137.6 LBS | HEART RATE: 64 BPM | DIASTOLIC BLOOD PRESSURE: 76 MMHG | TEMPERATURE: 97.6 F

## 2023-01-24 DIAGNOSIS — R76.8 LOW SERUM IGG FOR AGE: ICD-10-CM

## 2023-01-24 DIAGNOSIS — C91.10 CLL (CHRONIC LYMPHOCYTIC LEUKEMIA) (HCC): ICD-10-CM

## 2023-01-24 DIAGNOSIS — J98.8 RECURRENT RESPIRATORY INFECTION: Primary | ICD-10-CM

## 2023-01-24 DIAGNOSIS — Z14.8 ALPHA-1-ANTITRYPSIN DEFICIENCY CARRIER: ICD-10-CM

## 2023-01-24 DIAGNOSIS — R06.09 DOE (DYSPNEA ON EXERTION): ICD-10-CM

## 2023-01-24 DIAGNOSIS — K21.9 GASTROESOPHAGEAL REFLUX DISEASE WITHOUT ESOPHAGITIS: ICD-10-CM

## 2023-01-24 DIAGNOSIS — R91.8 LUNG NODULES: ICD-10-CM

## 2023-01-24 PROCEDURE — G8427 DOCREV CUR MEDS BY ELIG CLIN: HCPCS | Performed by: INTERNAL MEDICINE

## 2023-01-24 PROCEDURE — 3017F COLORECTAL CA SCREEN DOC REV: CPT | Performed by: INTERNAL MEDICINE

## 2023-01-24 PROCEDURE — G8484 FLU IMMUNIZE NO ADMIN: HCPCS | Performed by: INTERNAL MEDICINE

## 2023-01-24 PROCEDURE — 4004F PT TOBACCO SCREEN RCVD TLK: CPT | Performed by: INTERNAL MEDICINE

## 2023-01-24 PROCEDURE — 99214 OFFICE O/P EST MOD 30 MIN: CPT | Performed by: INTERNAL MEDICINE

## 2023-01-24 PROCEDURE — G8400 PT W/DXA NO RESULTS DOC: HCPCS | Performed by: INTERNAL MEDICINE

## 2023-01-24 PROCEDURE — 1123F ACP DISCUSS/DSCN MKR DOCD: CPT | Performed by: INTERNAL MEDICINE

## 2023-01-24 PROCEDURE — 1090F PRES/ABSN URINE INCON ASSESS: CPT | Performed by: INTERNAL MEDICINE

## 2023-01-24 PROCEDURE — G8420 CALC BMI NORM PARAMETERS: HCPCS | Performed by: INTERNAL MEDICINE

## 2023-01-24 ASSESSMENT — ENCOUNTER SYMPTOMS
CHEST TIGHTNESS: 0
APNEA: 0
COUGH: 1
ANAL BLEEDING: 0
RHINORRHEA: 0
WHEEZING: 0
SHORTNESS OF BREATH: 1
BACK PAIN: 0
ABDOMINAL PAIN: 0
ABDOMINAL DISTENTION: 0

## 2023-01-24 NOTE — PROGRESS NOTES
Pulmonary and Sleep Medicine    Herminio Mason (:  1950) is a 81 y.o. female,Established patient, here for evaluation of the following chief complaint(s):  Follow-up (Follow up- PFT, CT, Labs )      Referring physician:  No referring provider defined for this encounter. ASSESSMENT/PLAN:  1. Recurrent respiratory infection  2. [place] (dyspnea on exertion)  3. CLL (chronic lymphocytic leukemia) ([address]  4. 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 Gastroesophageal reflux disease without esophagitis  6. Alpha-1-antitrypsin deficiency carrier  7. Lung nodules  -     CT CHEST WO CONTRAST; Future      The patient's recurrent upper respiratory infections symptoms could be related to common variable immune deficiency secondary to low IgG versus immune deficiency secondary to CLL or both. She might benefit from a trial of IVIG. We will proceed with IVIG. Sherrell Matthews MD, [facility], [facility]    Return in about 3 months (around 2023). SUBJECTIVE/OBJECTIVE:  The patient is here for follow-up on recurrent respiratory infections and immunoglobulin G deficiency. She reports that recently she had again symptoms of upper respiratory tract infection. Her CT of the chest showed areas of groundglass opacities that are scattered and calcified granulomas. Pulmonary function study was nonspecific. CBC other than persistently elevated white blood cell count did not show any specific findings. Prior to Visit Medications    Medication Sig Taking?  Authorizing Provider   fluticasone-umeclidin-vilant (TRELEGY ELLIPTA) 100-62.5-25 MCG/ACT AEPB inhaler Inhale 1 puff into the lungs daily Yes Ara Lafleur MD   omeprazole (PRILOSEC) 40 MG delayed release capsule Take 1 capsule by mouth in the morning and at bedtime Yes Ara Lafleur MD   dicyclomine (BENTYL) 10 MG capsule Take 10 mg by mouth 2 times daily Yes Historical Provider, MD   metoprolol (LOPRESSOR) 100 MG tablet Take 100 mg by mouth 2 times daily Yes Historical Provider, MD   valsartan (DIOVAN) 320 MG tablet Take 320 mg by mouth daily Yes Historical Provider, MD   omeprazole (PRILOSEC) 40 MG delayed release capsule Take 40 mg by mouth daily Yes Historical Provider, MD        Review of Systems   Constitutional:  Negative for activity change, appetite change, chills, diaphoresis and fatigue. HENT:  Negative for congestion, dental problem, drooling, ear discharge, postnasal drip and rhinorrhea. Eyes:  Negative for visual disturbance. Respiratory:  Positive for cough and shortness of breath. Negative for apnea, chest tightness and wheezing. Gastrointestinal:  Negative for abdominal distention, abdominal pain and anal bleeding. Endocrine: Negative for cold intolerance, heat intolerance and polydipsia. Genitourinary:  Negative for difficulty urinating, dysuria, enuresis and flank pain. Musculoskeletal:  Negative for arthralgias, back pain and gait problem. Allergic/Immunologic: Negative for environmental allergies. Neurological:  Negative for dizziness, facial asymmetry, light-headedness and headaches. Vitals:    01/24/23 0907   BP: 120/76   Pulse: 64   Temp: 97.6 °F (36.4 °C)   SpO2: 99%     BMI Readings from Last 1 Encounters:   01/24/23 22.90 kg/m²         Physical Exam  Vitals reviewed. Constitutional:       Appearance: Normal appearance. HENT:      Head: Normocephalic and atraumatic. Nose: Nose normal.   Eyes:      Extraocular Movements: Extraocular movements intact. Conjunctiva/sclera: Conjunctivae normal.   Cardiovascular:      Rate and Rhythm: Normal rate and regular rhythm. Heart sounds: No murmur heard. No friction rub. Pulmonary:      Effort: Pulmonary effort is normal. No respiratory distress. Breath sounds: Normal breath sounds. No stridor. No wheezing, rhonchi or rales. Abdominal:      General: There is no distension. Palpations: There is no mass. Tenderness:  There is no abdominal tenderness. There is no guarding or rebound. Musculoskeletal:      Cervical back: Normal range of motion and neck supple. Neurological:      Mental Status: She is alert and oriented to person, place, and time. This note was generated using a voice recognition software. Errors in voice recognition may have occurred. An electronic signature was used to authenticate this note.     --Jasmin Kim MD

## 2023-01-27 ENCOUNTER — OFFICE VISIT (OUTPATIENT)
Dept: ONCOLOGY | Facility: CLINIC | Age: 73
End: 2023-01-27
Payer: MEDICARE

## 2023-01-27 VITALS
DIASTOLIC BLOOD PRESSURE: 86 MMHG | HEART RATE: 61 BPM | SYSTOLIC BLOOD PRESSURE: 138 MMHG | RESPIRATION RATE: 18 BRPM | WEIGHT: 136.5 LBS | BODY MASS INDEX: 22.74 KG/M2 | OXYGEN SATURATION: 99 % | TEMPERATURE: 97.2 F | HEIGHT: 65 IN

## 2023-01-27 DIAGNOSIS — C91.10 CLL (CHRONIC LYMPHOCYTIC LEUKEMIA): Primary | ICD-10-CM

## 2023-01-27 PROBLEM — R76.8 LOW SERUM IGG FOR AGE: Status: ACTIVE | Noted: 2022-12-27

## 2023-01-27 PROBLEM — Z14.8 ALPHA-1-ANTITRYPSIN DEFICIENCY CARRIER: Status: ACTIVE | Noted: 2023-01-24

## 2023-01-27 PROCEDURE — 99214 OFFICE O/P EST MOD 30 MIN: CPT | Performed by: INTERNAL MEDICINE

## 2023-02-06 ENCOUNTER — TELEPHONE (OUTPATIENT)
Dept: PULMONOLOGY | Age: 73
End: 2023-02-06

## 2023-02-06 NOTE — TELEPHONE ENCOUNTER
Patient is requesting a return call in regards to a IGG infusion. She states her oncologist did not recommend it. She states the infusion is not covered by her insurance. She states the treatment would cost $2,000 each time. She ask if there is anything oral she can take. Please return her call to discuss. Thank you!

## 2023-04-25 ENCOUNTER — OFFICE VISIT (OUTPATIENT)
Dept: PULMONOLOGY | Age: 73
End: 2023-04-25
Payer: MEDICARE

## 2023-04-25 VITALS
OXYGEN SATURATION: 98 % | HEIGHT: 65 IN | BODY MASS INDEX: 23.22 KG/M2 | HEART RATE: 60 BPM | WEIGHT: 139.4 LBS | TEMPERATURE: 97.4 F | SYSTOLIC BLOOD PRESSURE: 144 MMHG | DIASTOLIC BLOOD PRESSURE: 80 MMHG

## 2023-04-25 DIAGNOSIS — R05.3 CHRONIC COUGH: ICD-10-CM

## 2023-04-25 DIAGNOSIS — Z14.8 ALPHA-1-ANTITRYPSIN DEFICIENCY CARRIER: ICD-10-CM

## 2023-04-25 DIAGNOSIS — J98.8 RECURRENT RESPIRATORY INFECTION: ICD-10-CM

## 2023-04-25 DIAGNOSIS — R06.09 DOE (DYSPNEA ON EXERTION): Primary | ICD-10-CM

## 2023-04-25 DIAGNOSIS — K21.9 GASTROESOPHAGEAL REFLUX DISEASE WITHOUT ESOPHAGITIS: ICD-10-CM

## 2023-04-25 DIAGNOSIS — D83.9 CVID (COMMON VARIABLE IMMUNODEFICIENCY) (HCC): ICD-10-CM

## 2023-04-25 PROCEDURE — 1090F PRES/ABSN URINE INCON ASSESS: CPT | Performed by: INTERNAL MEDICINE

## 2023-04-25 PROCEDURE — 3017F COLORECTAL CA SCREEN DOC REV: CPT | Performed by: INTERNAL MEDICINE

## 2023-04-25 PROCEDURE — G8427 DOCREV CUR MEDS BY ELIG CLIN: HCPCS | Performed by: INTERNAL MEDICINE

## 2023-04-25 PROCEDURE — G8420 CALC BMI NORM PARAMETERS: HCPCS | Performed by: INTERNAL MEDICINE

## 2023-04-25 PROCEDURE — 1123F ACP DISCUSS/DSCN MKR DOCD: CPT | Performed by: INTERNAL MEDICINE

## 2023-04-25 PROCEDURE — 4004F PT TOBACCO SCREEN RCVD TLK: CPT | Performed by: INTERNAL MEDICINE

## 2023-04-25 PROCEDURE — G8400 PT W/DXA NO RESULTS DOC: HCPCS | Performed by: INTERNAL MEDICINE

## 2023-04-25 PROCEDURE — 99213 OFFICE O/P EST LOW 20 MIN: CPT | Performed by: INTERNAL MEDICINE

## 2023-04-25 ASSESSMENT — ENCOUNTER SYMPTOMS
ABDOMINAL DISTENTION: 0
SHORTNESS OF BREATH: 0
ABDOMINAL PAIN: 0
WHEEZING: 0
COUGH: 1
ANAL BLEEDING: 0
BACK PAIN: 0
APNEA: 0
RHINORRHEA: 0
CHEST TIGHTNESS: 0

## 2023-04-25 NOTE — PROGRESS NOTES
Constitutional:  Negative for activity change, appetite change, chills, diaphoresis and fatigue. HENT:  Negative for congestion, dental problem, drooling, ear discharge, postnasal drip and rhinorrhea. Eyes:  Negative for visual disturbance. Respiratory:  Positive for cough. Negative for apnea, chest tightness, shortness of breath and wheezing. Gastrointestinal:  Negative for abdominal distention, abdominal pain and anal bleeding. Endocrine: Negative for cold intolerance, heat intolerance and polydipsia. Genitourinary:  Negative for difficulty urinating, dysuria, enuresis and flank pain. Musculoskeletal:  Negative for arthralgias, back pain and gait problem. Allergic/Immunologic: Negative for environmental allergies. Neurological:  Negative for dizziness, facial asymmetry, light-headedness and headaches. Vitals:    04/25/23 0926   BP: (!) 144/80   Pulse: 60   Temp: 97.4 °F (36.3 °C)   SpO2: 98%     BMI Readings from Last 1 Encounters:   04/25/23 23.20 kg/m²         Physical Exam  Vitals reviewed. Constitutional:       Appearance: Normal appearance. HENT:      Head: Normocephalic and atraumatic. Nose: Nose normal.   Eyes:      Extraocular Movements: Extraocular movements intact. Conjunctiva/sclera: Conjunctivae normal.   Cardiovascular:      Rate and Rhythm: Normal rate and regular rhythm. Heart sounds: No murmur heard. No friction rub. Pulmonary:      Effort: Pulmonary effort is normal. No respiratory distress. Breath sounds: Normal breath sounds. No stridor. No wheezing, rhonchi or rales. Abdominal:      General: There is no distension. Palpations: There is no mass. Tenderness: There is no abdominal tenderness. There is no guarding or rebound. Musculoskeletal:      Cervical back: Normal range of motion and neck supple. Neurological:      Mental Status: She is alert and oriented to person, place, and time.            This note was generated using a

## 2023-05-04 ENCOUNTER — HOSPITAL ENCOUNTER (OUTPATIENT)
Dept: GENERAL RADIOLOGY | Facility: HOSPITAL | Age: 73
Discharge: HOME | End: 2023-05-04
Payer: MEDICARE

## 2023-05-04 ENCOUNTER — TRANSCRIBE ORDERS (OUTPATIENT)
Dept: ADMINISTRATIVE | Facility: HOSPITAL | Age: 73
End: 2023-05-04
Payer: MEDICARE

## 2023-05-04 DIAGNOSIS — M25.551 PAIN IN RIGHT HIP: ICD-10-CM

## 2023-05-04 DIAGNOSIS — M25.551 PAIN IN RIGHT HIP: Primary | ICD-10-CM

## 2023-05-04 PROCEDURE — 72110 X-RAY EXAM L-2 SPINE 4/>VWS: CPT

## 2023-05-04 PROCEDURE — 73502 X-RAY EXAM HIP UNI 2-3 VIEWS: CPT

## 2023-07-21 ENCOUNTER — LAB (OUTPATIENT)
Dept: LAB | Facility: HOSPITAL | Age: 73
End: 2023-07-21
Payer: MEDICARE

## 2023-07-21 DIAGNOSIS — C91.10 CLL (CHRONIC LYMPHOCYTIC LEUKEMIA): ICD-10-CM

## 2023-07-21 LAB
ALBUMIN SERPL-MCNC: 4 G/DL (ref 3.5–5.2)
ALBUMIN/GLOB SERPL: 1.8 G/DL
ALP SERPL-CCNC: 105 U/L (ref 39–117)
ALT SERPL W P-5'-P-CCNC: 9 U/L (ref 1–33)
ANION GAP SERPL CALCULATED.3IONS-SCNC: 8 MMOL/L (ref 5–15)
AST SERPL-CCNC: 14 U/L (ref 1–32)
B2 MICROGLOB SERPL-MCNC: 1.6 MG/L (ref 0.8–2.2)
BASOPHILS # BLD AUTO: 0.05 10*3/MM3 (ref 0–0.2)
BASOPHILS NFR BLD AUTO: 0.4 % (ref 0–1.5)
BILIRUB SERPL-MCNC: 0.4 MG/DL (ref 0–1.2)
BUN SERPL-MCNC: 15 MG/DL (ref 8–23)
BUN/CREAT SERPL: 21.7 (ref 7–25)
CALCIUM SPEC-SCNC: 9.2 MG/DL (ref 8.6–10.5)
CHLORIDE SERPL-SCNC: 101 MMOL/L (ref 98–107)
CO2 SERPL-SCNC: 29 MMOL/L (ref 22–29)
CREAT SERPL-MCNC: 0.69 MG/DL (ref 0.57–1)
DEPRECATED RDW RBC AUTO: 44.8 FL (ref 37–54)
EGFRCR SERPLBLD CKD-EPI 2021: 93.5 ML/MIN/1.73
EOSINOPHIL # BLD AUTO: 0.08 10*3/MM3 (ref 0–0.4)
EOSINOPHIL NFR BLD AUTO: 0.7 % (ref 0.3–6.2)
ERYTHROCYTE [DISTWIDTH] IN BLOOD BY AUTOMATED COUNT: 13.1 % (ref 12.3–15.4)
GLOBULIN UR ELPH-MCNC: 2.2 GM/DL
GLUCOSE SERPL-MCNC: 108 MG/DL (ref 65–99)
HCT VFR BLD AUTO: 36.9 % (ref 34–46.6)
HGB BLD-MCNC: 11.6 G/DL (ref 12–15.9)
IGG1 SER-MCNC: 612 MG/DL (ref 700–1600)
LDH SERPL-CCNC: 197 U/L (ref 135–214)
LYMPHOCYTES # BLD AUTO: 8.89 10*3/MM3 (ref 0.7–3.1)
LYMPHOCYTES NFR BLD AUTO: 72.5 % (ref 19.6–45.3)
MCH RBC QN AUTO: 29.6 PG (ref 26.6–33)
MCHC RBC AUTO-ENTMCNC: 31.4 G/DL (ref 31.5–35.7)
MCV RBC AUTO: 94.1 FL (ref 79–97)
MONOCYTES # BLD AUTO: 0.42 10*3/MM3 (ref 0.1–0.9)
MONOCYTES NFR BLD AUTO: 3.4 % (ref 5–12)
NEUTROPHILS NFR BLD AUTO: 2.79 10*3/MM3 (ref 1.7–7)
NEUTROPHILS NFR BLD AUTO: 22.8 % (ref 42.7–76)
PLATELET # BLD AUTO: 315 10*3/MM3 (ref 140–450)
PMV BLD AUTO: 9.1 FL (ref 6–12)
POTASSIUM SERPL-SCNC: 4.3 MMOL/L (ref 3.5–5.2)
PROT SERPL-MCNC: 6.2 G/DL (ref 6–8.5)
RBC # BLD AUTO: 3.92 10*6/MM3 (ref 3.77–5.28)
SODIUM SERPL-SCNC: 138 MMOL/L (ref 136–145)
WBC NRBC COR # BLD: 12.26 10*3/MM3 (ref 3.4–10.8)

## 2023-07-21 PROCEDURE — 82784 ASSAY IGA/IGD/IGG/IGM EACH: CPT

## 2023-07-21 PROCEDURE — 82232 ASSAY OF BETA-2 PROTEIN: CPT

## 2023-07-21 PROCEDURE — 36415 COLL VENOUS BLD VENIPUNCTURE: CPT

## 2023-07-21 PROCEDURE — 80053 COMPREHEN METABOLIC PANEL: CPT

## 2023-07-21 PROCEDURE — 85025 COMPLETE CBC W/AUTO DIFF WBC: CPT

## 2023-07-21 PROCEDURE — 83615 LACTATE (LD) (LDH) ENZYME: CPT

## 2023-07-23 NOTE — PROGRESS NOTES
[facility] ON[facility] GROUP HEMATOLOGY AND ONCOLOGY  [address]  [facility] [address] [phone]    Patient Name: Liset Wright  Encounter Date: 07/28/2023  YOB: 1950  Patient Number: [id number]       REASON FOR VISIT:  Liset Wright is a 72-year-old female who returns in follow-up of stage 0 chronic lymphocytic leukemia (B-CLL). She is seen 54 months since her initial visit on 01/28/2019. She remains in observation. She is here alone.    I have reviewed the HPI and verified with the patient the accuracy of it. No changes to interval history since the information was documented. Jay Cedeno MD 07/28/23      DIAGNOSTIC ABNORMALITIES:   On 11/28/2018, labs showed a WBC of 13.9 with 78% lymphocytes (ALC 10.9), ANC 2.4, and was otherwise normal. Hemoglobin 12, hematocrit 36.7, MCV 87, platelets 261,000. CMP was normal in its entirety to include a BUN of 11, creatinine 0.78, GFR 79, calcium 9.2, total protein 6.8, and normal liver enzymes.  On 01/11/2019, CT of the abdomen and pelvis with IV contrast at [facility] showed no acute pathology in the abdomen or pelvis (no masses or any abnormalities to account for her left upper quadrant pain with no adenopathy and normal spleen).  On 01/16/2019, Ainsley-Barr virus titers showed an IgG level of 239 (0 - 17.9), Ainsley-Barr virus nuclear antigen IgG 118 (0 - 17.9), but IgM of less than 36 and early antigen of less than 9 (indicating prior infection).  On 01/10/2019, repeat CBC showed a hemoglobin of 11.3, hematocrit 35.5, MCV 91.3, platelets 249,000, WBC 13.29.   On 01/14/2019, blood smear (manual) review showed atypical lymphocytosis, moderate smudge cells present. RBC morphology showed normocytic, normochromic anemia without significant morphologic abnormality. Platelets normal morphology.  On 01/17/2019, peripheral blood was sent for flow cytometry ([facility]). This  revealed chronic lymphocytic leukemia (54% of total cells). Monoclonal B cells have a CLL immunophenotype and are negative for both CD38 and ZAP-70 associated with standard risk disease. PCR for IGVH and p53 mutation and FISH for CLL may provide additional prognostic information. Virtually all of the B cells are monoclonal and express CD19 (dim), CD20 (dim), CD5, CD23, CD11c, and dim surface kappa light chain. They are negative for CD10, CD38, FMC-7, ZAP-70, and surface lambda light chain.   Labs, 01/28/2019: Hemoglobin 13.1, hematocrit 39.5, MCV 90.5, platelets 283,000, WBC 12.2 with 19.3 segs (ANC 2.4), 75.8 lymphocytes (ALC 9.2). CMP normal. GFR 85, calcium 9.9, total protein 7.1, and normal liver enzymes.  (265 - 665). Beta 2 microglobulin 1.3. Serum iron 101, TIBC 333, iron saturation 30%, B12 of 431, folate 15 (each within reference range). Ferritin 23.7 (depressed). HIV screen negative. IgG 742.  Bone marrow biopsy/aspirate, 02/01/2019: PERIPHERAL BLOOD: B cell chronic lymphocytic leukemia. BONE MARROW, UNSPECIFIED SITE, SMEARS, CLOT AND BIOPSY: Involved by B cell chronic lymphocytic leukemia (30%). CLL panel: Positive for a deletion of DLEUI, DILEU2 on chromosome 13 at 04 (58,0% of cells), consistent with CLL. Isolated del 1304.3 is associated with a favorable clinical course. Three of the twenty mitotic cells examined were characterized by loss of one copy of the X chromosome and a deletion in the long arm of chromosome 13.   Stools OB x 3, 02/25/2019. Negative x 3    PREVIOUS INTERVENTIONS:   Observation        Problem List Items Addressed This Visit          Other    CLL (chronic lymphocytic leukemia) - Primary       Oncology/Hematology History    No history exists.       PAST MEDICAL HISTORY:  ALLERGIES:  Allergies   Allergen Reactions    Levofloxacin Paresthesia    Phenergan [Promethazine Hcl] Other (See Comments)     Jerky movements    Promethazine Unknown - High Severity    Ramipril  Palpitations     CURRENT MEDICATIONS:  Outpatient Encounter Medications as of 7/28/2023   Medication Sig Dispense Refill    albuterol sulfate  (90 Base) MCG/ACT inhaler Inhale 2 puffs Every 4 (Four) Hours As Needed for Wheezing. 18 g 4    dicyclomine (BENTYL) 10 MG capsule Take 1 capsule by mouth 2 (Two) Times a Day.      esomeprazole (nexIUM) 40 MG capsule Take 1 capsule by mouth 2 (Two) Times a Day. (Patient taking differently: Take 1 capsule by mouth Daily.) 60 capsule 11    metoprolol tartrate (LOPRESSOR) 100 MG tablet Take 1 tablet by mouth 3 (Three) Times a Day.      omeprazole (priLOSEC) 40 MG capsule Take 1 capsule by mouth Daily.      valsartan (DIOVAN) 320 MG tablet Take 0.5 tablets by mouth Daily.       No facility-administered encounter medications on file as of 7/28/2023.     ADULT ILLNESSES:   Chronic lymphocytic leukemia ( ICD-10:C91.10 ;Chronic lymphocytic leukemia of B-cell type not having achieved remission   Abdominal pain ( ICD-10:R10.12 ;Left upper quadrant pain   Asthma ( Dr. Johnson; ICD-10:J45.909 ;Unspecified asthma, uncomplicated )   Cervical degenerative disk disease ( x-ray 11/2017; ICD-10:M50.20 ;Other cervical disc displacement, unspecified cervical region )   Erosive gastritis ( Dr. Kaur; ICD-10:K29.60 ;Other gastritis without bleeding )   Gastroesophageal reflux disease ( GERD, Dr. Kaur; ICD-10:K21.9 ;Gastro-esophageal reflux disease without esophagitis )   Gastroparesis ( ICD-10:K31.84 ;Gastroparesis   Hypertension ( ICD-10:I10 ;Essential (primary) hypertension   Irritable bowel syndrome ( ICD-10:K58.9 ;Irritable bowel syndrome without diarrhea   Migraines ( ICD-10:G43.909 ;Migraine, unspecified, not intractable, without status migrainosus   Normocytic anemia ( ICD-10:D64.9 ;Anemia, unspecified   Palpitations ( normal heart cath, 02/2003, bilateral ultrasound showed no significant stenosis, 06/2015, stress echo 05/2018 normal; ICD-10:R00.2 ;Palpitations )   Chemo  "esophageal ring ( erosive gastritis/gastroesophageal reflux disease, Dr. Kaur; ICD-10:K22.2 ;Esophageal obstruction )  Right hip fracture following a fall, 04/13/2021.  Non-surgical management      SURGERIES:   Cholecystectomy   Cardiac catheterization, 02/2003   Colonoscopy, 2017. \"No polyps.\" 5 years. Dr. Kaur   EGD (upper endoscopy), 2018, normal, Dr. Kaur   Hysterectomy, total      ADULT ILLNESSES:  Patient Active Problem List   Diagnosis Code    Palpitations R00.2    PVCs (premature ventricular contractions) I49.3    Essential hypertension I10    Epigastric pain R10.13    CLL (chronic lymphocytic leukemia) C91.10    Bronchitis J40    Restrictive lung disease J98.4    Encounter for screening for malignant neoplasm of colon Z12.11    Right ventricular dilation I51.7    Chest pain R07.9    Closed fracture of pelvis with routine healing S32.9XXD    Degeneration of lumbar or lumbosacral intervertebral disc M51.37    Non-smoker Z78.9    Body mass index (BMI) of 23.0 to 23.9 in adult Z68.23    Gastroesophageal reflux disease K21.9    Leukemia C95.90    Nausea R11.0    Alpha-1-antitrypsin deficiency carrier Z14.8    Low serum IgG for age R76.8     SURGERIES:  Past Surgical History:   Procedure Laterality Date    CARDIAC CATHETERIZATION      CHOLECYSTECTOMY      COLONOSCOPY  10/16/2015    normal    COLONOSCOPY N/A 10/19/2020    Procedure: COLONOSCOPY WITH ANESTHESIA;  Surgeon: Grant Kaur DO;  Location: [facility] ENDOSCOPY;  Service: Gastroenterology;  Laterality: N/A;  pre: screening  post: normal  Harry Hastings MD    ENDOSCOPY N/A 11/23/2016    normal    ENDOSCOPY N/A 12/11/2018    Procedure: ESOPHAGOGASTRODUODENOSCOPY WITH ANESTHESIA;  Surgeon: Grant Kaur DO;  Location: [facility] ENDOSCOPY;  Service: Gastroenterology    ENDOSCOPY N/A 11/25/2020    Procedure: ESOPHAGOGASTRODUODENOSCOPY WITH ANESTHESIA;  Surgeon: Grant Kaur DO;  Location: [facility] ENDOSCOPY;  Service: Gastroenterology;  " Laterality: N/A;  preop; chest pain  postop; normal   PCP Harry Hastings     ENDOSCOPY N/A 11/11/2022    Procedure: ESOPHAGOGASTRODUODENOSCOPY WITH ANESTHESIA;  Surgeon: Grant Kaur DO;  Location: [facility] ENDOSCOPY;  Service: Gastroenterology;  Laterality: N/A;  Pre: Nausea  Post:normal  Harry Hastings MD    HYSTERECTOMY       HEALTH MAINTENANCE ITEMS:  Health Maintenance Due   Topic Date Due    TDAP/TD VACCINES (1 - Tdap) Never done    ZOSTER VACCINE (1 of 2) Never done    HEPATITIS C SCREENING  Never done    LIPID PANEL  12/15/2022    DXA SCAN  01/14/2023    COVID-19 Vaccine (5 - Pfizer risk series) 04/05/2023       <no information>  Last Completed Colonoscopy            COLORECTAL CANCER SCREENING (COLONOSCOPY - Every 5 Years) Next due on 10/19/2025      10/19/2020  Surgical Procedure: COLONOSCOPY    10/19/2020  COLONOSCOPY    02/25/2019  Occult Blood X 3, Stool - Stool, Per Rectum    10/16/2015  SCANNED - COLONOSCOPY    06/17/2011  SCANNED - COLONOSCOPY                  Immunization History   Administered Date(s) Administered    COVID-19 (PFIZER) BIVALENT 12+YRS 02/08/2023    COVID-19 (PFIZER) Purple Cap Monovalent 01/15/2021, 02/05/2021    FLUAD TRI 65YR+ 10/06/2017, 10/09/2018    FluLaval/Fluzone >6mos 11/01/2019    Fluzone High Dose =>65 Years ([employer] ONLY) 11/11/2021    Fluzone Quad >6mos (Multi-dose) 10/01/2018    Hep B, Unspecified 01/10/2002, 02/15/2002, 05/16/2002    Influenza Quad Vaccine (Inpatient) 10/01/2018    Influenza, Unspecified 11/02/2020    Pneumococcal Conjugate 13-Valent (PCV13) 10/06/2017    Pneumococcal Polysaccharide (PPSV23) 10/01/2018     Last Completed Mammogram            MAMMOGRAM (Every 2 Years) Next due on 1/19/2025 01/19/2023  Outside Claim: HC MAMMOGRAM SCREENING BILAT DIGITAL W CAD,CHG SCREENING DIGITAL BREAST TOMOSYNTHESIS BI    01/17/2022  Outside Claim: HC MAMMOGRAM SCREENING BILAT DIGITAL W CAD,CHG SCREENING DIGITAL BREAST TOMOSYNTHESIS BI    01/14/2021  Outside  "Claim: HC MAMMOGRAM SCREENING BILAT DIGITAL W CAD,CHG SCREENING DIGITAL BREAST TOMOSYNTHESIS BI    12/27/2019  Outside Claim: HC MAMMOGRAM SCREENING BILAT DIGITAL W CAD,CHG SCREENING DIGITAL BREAST TOMOSYNTHESIS BI    12/21/2018  Outside Claim: HC MAMMOGRAM SCREENING BILAT DIGITAL W CAD,CHG SCREENING DIGITAL BREAST TOMOSYNTHESIS BI    Only the first 5 history entries have been loaded, but more history exists.                      FAMILY HISTORY:  Family History   Problem Relation Age of Onset    Cancer Mother     Cancer Father         lung    Cancer Paternal Grandmother         stomach    No Known Problems Brother     No Known Problems Maternal Aunt     No Known Problems Maternal Uncle     No Known Problems Paternal Aunt     No Known Problems Paternal Uncle     No Known Problems Maternal Grandmother     No Known Problems Maternal Grandfather     No Known Problems Paternal Grandfather     No Known Problems Other     Colon cancer Neg Hx     Esophageal cancer Neg Hx     Asthma Neg Hx     Diabetes Neg Hx     Emphysema Neg Hx     Heart failure Neg Hx     Hypertension Neg Hx     Colon polyps Neg Hx      SOCIAL HISTORY:  Social History     Socioeconomic History    Marital status:    Tobacco Use    Smoking status: Never     Passive exposure: Current    Smokeless tobacco: Never   Vaping Use    Vaping Use: Never used   Substance and Sexual Activity    Alcohol use: No    Drug use: No    Sexual activity: Defer       REVIEW OF SYSTEMS:  Constitutional:   The patient's appetite is good but energy is variable, some days better than others.  \"I feel ok.\" She manages her ADLs including chores, errands.  She still drives.  She has gained 1 lb (in addition to 3 lb at her prior visit) since her last visit.  She has no fevers, chills, or drenching night sweats. Her sleep habits seem appropriate.  Ear/Nose/Throat/Mouth:   She reports no ear pains, sinus symptoms, sore throat, nosebleeds, or sore tongue. She has migraine headaches. " "She denies any hoarseness.   Ocular:   She reports no eye pain, significant change in visual acuity, double vision, or blurry vision.  Respiratory:   Says she is prone to respiratory infections due to alpha 1 antitrypsin deficiency.  Most recent pneumonia last 04/2022.  Again says, \"It took 6 weeks to get over it.\"  Says she was \"sick\" over Thanksgiving last 11/2022 for which she was again on antibiotics.  She has some exertional dyspnea from possible asthma but no chronic cough, significant shortness of breathing at rest, phlegm production, or unexplained chest wall pain. Says prior PFTs suggest \"asthma\". Now sees [facility]  Cardiovascular:   She reports no exertional chest pain, chest pressure, or chest heaviness. She reports no claudication. She reports occasional palpitations but denies symptomatic orthostasis.  Gastrointestinal:   She reports no dysphagia, nausea, vomiting, postprandial abdominal pain, bloating, cramping, change in bowel habits, with dark (OTC iron) discoloration of the stool. She reports no rectal bleeding. She reports occasional but chronic constipation requiring stool softeners, lifelong. She has no diarrhea.  Genitourinary:   She reports no urinary burning, frequency, dribbling, or discoloration. She reports no difficulty controlling her bladder. She has no need to urinate frequently through the night.   Musculoskeletal:          She reports no unexplained arthralgias, myalgias, or nighttime leg cramping.  Says she sustained a right hip fracture following a fall, 04/13/2021.  Non-surgical management.  Has only intermittent residual pains, \"sometimes.\".  Extremities:   She reports no trouble with fluid retention or significant leg swelling.  Endocrine:   She reports no problems with excess thirst, excessive urination, vasomotor instability, or unexplained fatigue.  Heme/Lymphatic:   She reports easy bruising but no unexplained bleeding, petechial rashes, or swollen glands.  Skin:   She " "reports no itching, rashes, or lesions which won't heal.  Neuro:   She reports no loss of consciousness, seizures, fainting spells, or dizziness. She reports no weakness of face, arms, or legs. She has no difficulty with speech. She has no tremors. She admits to occasional paresthesias of the hands.   Psych:   She seems generally satisfied with life. She denies depression. She reports no mood swings.       VITAL SIGNS: /76   Pulse 60   Temp 97.5 °F (36.4 °C) (Temporal)   Resp 18   Ht 165.1 cm (65\")   Wt 62.1 kg (137 lb)   LMP  (LMP Unknown)   SpO2 98%   BMI 22.80 kg/m² Body surface area is 1.68 meters squared.  Pain Score    07/28/23 0912   PainSc: 0-No pain            PHYSICAL EXAMINATION:   General:   She is a pleasant, cooperative, slender but otherwise well-developed, well-nourished, and modestly-kept elderly female who is comfortable at rest. She arrived in the exam room ambulatory. She appears to be her stated age. Her skin color is normal. ECOG 0  Head/Neck:   The patient is anicteric and atraumatic. The trachea is midline. The neck is supple without evidence of jugular venous distention or cervical adenopathy. Prominent, non-tender right sternoclavicular joint.  Eyes:   The pupils are equal, round, and reactive to light. The extraocular movements are full. There is no scleral jaundice or erythema.   Chest:   The respiratory efforts are normal and unhindered. The chest is clear to auscultation and percussion. There are no wheezes, rhonchi, rales, or asymmetry of breath sounds.  Cardiovascular:   The patient has a regular cardiac rate and rhythm without murmurs, rubs, or gallops. The peripheral pulses are equal and full.  Abdomen:   The belly is soft and flat. There is no rebound or guarding. There is no organomegaly, mass-effect, or tenderness. Bowel sounds are active and of normal character.  Extremities:   There is no evidence of cyanosis, clubbing, or edema.  Rheumatologic:   There is no " overt evidence of rheumatoid deformities of the hands. There is no sausaging of the fingers. There is no sign of active synovitis. The gait is normal.  Cutaneous:   There are no overt rashes, disseminated lesions, purpura, or petechiae.   Lymphatics:   There is no evidence of adenopathy in the cervical, supraclavicular, axillary, inguinal, or femoral areas. There is no splenomegaly.  Neurologic:   The patient is alert, oriented, cooperative, and pleasant. She is appropriately conversant. She ambulated into the exam room without assistance and transferred from chair to exam table unaided. There is no overt dysfunction of the motor, sensory, cerebellar systems.  Psych:   Mood and affect are appropriate for circumstance. Eye contact is appropriate. Normal judgement and decision making.         LABS    Lab Results - Last 18 Months   Lab Units 07/21/23  0920 01/16/23  0810 12/27/22  1009 08/02/22  0743 02/07/22  0832   HEMOGLOBIN g/dL 11.6* 11.1* 12.3 11.2* 11.7*   HEMATOCRIT % 36.9 35.7 39.1 35.7 37.2   MCV fL 94.1 97.3* 98.2 95.5 94.4   WBC 10*3/mm3 12.26* 17.80* 19.1* 15.39* 13.69*   RDW % 13.1 14.4 14.8* 13.4 13.1   MPV fL 9.1 9.2 9.2* 9.3 9.4   PLATELETS 10*3/mm3 315 241 275 277 221   NEUTROS ABS 10*3/mm3 2.79 6.00 3.2 1.88 2.71   LYMPHS ABS 10*3/mm3 8.89*  --  15.1*  --   --    MONOS ABS 10*3/mm3 0.42  --  0.40  --   --    EOS ABS 10*3/mm3 0.08 0.18 0.19  --  0.14   BASOS ABS 10*3/mm3 0.05  --  0.20  --   --    IMMATURE GRANS (ABS) K/uL  --   --  0.1  --   --    NEUTROPHIL % %  --  33.7*  --  12.2* 19.8*   MONOCYTES % %  --  1.0*  --  0.8* 2.1*   ATYP LYMPH % %  --  10.2*  --  0.8 6.3*   ANISOCYTOSIS   --   --   --  Mod/2+  --        Lab Results - Last 18 Months   Lab Units 07/21/23  0920 02/07/22  0832   GLUCOSE mg/dL 108* 114*   SODIUM mmol/L 138 140   POTASSIUM mmol/L 4.3 4.3   CO2 mmol/L 29.0 30.0*   CHLORIDE mmol/L 101 104   ANION GAP mmol/L 8.0 6.0   CREATININE mg/dL 0.69 0.66   BUN mg/dL 15 10   BUN / CREAT  RATIO  21.7 15.2   CALCIUM mg/dL 9.2 9.0   EGFR IF NONAFRICN AM mL/min/1.73  --  89   ALK PHOS U/L 105 106   TOTAL PROTEIN g/dL 6.2 6.5   ALT (SGPT) U/L 9 19   AST (SGOT) U/L 14 24   BILIRUBIN mg/dL 0.4 0.7   ALBUMIN g/dL 4.0 4.50   GLOBULIN gm/dL 2.2 2.0       Lab Results - Last 18 Months   Lab Units 23  0920 23  0810 22  0743 22  0832   LDH U/L 197 213 212 242*       Lab Results - Last 18 Months   Lab Units 23  0810 22  0743 22  0832   IRON mcg/dL 78 90 87   TIBC mcg/dL 332 326 323   IRON SATURATION (TSAT) % 23 28 27   FERRITIN ng/mL 111.90 184.30* 133.00       ASSESSMENT:   1. B cell chronic lymphocytic leukemia (B-CLL):   Stage: 0   Tumor [place]: Lymphocytosis.   Complications of Tumor: None.   Tumor Status: Untreated.   IVGH and p53 mutations: Pending.   CD38: Negative.   ZAP-70: Negative.   Isolated del 13q14.3 (favorable)   LDH: 197, 23 (prior: 212 - 599)   B2M: 1.6, 23 (prior: 1.3 - 2.1)   Ig, 23 (prior: 517 - 742)  23- WBC 12.26; ALC 8.89  (range: WBC 13.0-23.99; ALC 8.64-13.83)  2. Normocytic anemia.    --Repleted ferritin -133, 2022 (from 159, 145.4, 131.5; 53.2; 35; 23).   --Hgb 11.6; MCV 94.1, 23 (prior: Hgb 11.3 - 13.3; MCV 87 - 98.2)   3. Irritable bowel syndrome. On Bentyl  4. Gastroparesis. On omeprazole  5. Migraines. On Excedrin migraines  6. Cervical degenerative disk disease.  No meds        7.  Alpha 1 antitrypsin deficiency. Dr. Johnson.  Now followed by Dr. Vasquez        8.  Palpitations with echo, 2020 showing grade II diastolic dysfunction but EF > 70%.  Dr. Hawk follows        9.  Prominent right sterno-clavicular joint. Asymptomatic         --2020-right sternoclavicular joint x-ray.  Mild arthritic changes of the inferior proximal right clavicle adjacent to the sternoclavicular joint with appropriate alignment.         10. Received COVID # 4 vaccination, 2022       11. Recurrent pulmonary  infections.  Now sees pulmonary in [facility]- Dr. Russell  --1/24/2023- Visit with Dr. 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 She reports that recently she had again symptoms of upper respiratory tract infection. Her CT of the chest showed areas of groundglass opacities that are scattered and calcified granulomas.  The patient's recurrent upper respiratory infections symptoms could be related to common variable immune deficiency secondary to low IgG versus immune deficiency secondary to CLL or both. She might benefit from a trial of IVIG. We will proceed with IVIG.  --7/25/23- CT chest- Multiple nodular densities within the lungs bilaterally which have developed in the interval.  PET imaging recommended. Unexpected findings.  Multiple new pulmonary masses.         RECOMMENDATIONS:   1.   Apprised of labs from 7/21/23, Normal CMP, stable lymphocytosis, stable anemia, normal platelets, pending iron levels, normal B2M, normal LDH, adequate IgG 612 (prior: 517).    2.  Apprised of CT chest, 7/25/23 (above).  New lung nodules.  Infectious? PET scan recommended.  Is scheduled to see pulmonary (Dr. Vasquez), 8/1/23  3.  Prior visit with Dr. Vasquez, 1/24 (above) noted.  IVIG trial planned but patient never received due to insurance denial  4.  Previously discussed the labs from 02/21 and 01/28/2019 stable low grade leukocytosis and lymphocytosis (greater than 5000) normal Hgb and normal platelets, normal CMP, normal LDH, normal B2M, repleted serum iron, repleted (> 20%) fe sat, low (< 100) ferritin, repleted B12/folate, negative HIV screen, IgG > 500.   5.  Previously discussed the bone marrow biopsy, 02/01 (above). Confirms B-CLL with del 13q14 (favorable)   6.  B- CLL again previously discussed. I had explained that standard therapy has not appreciably altered the nature history of CLL and survival curves demonstrate that CLL is an incurable disease. Randomized  "studies have failed to show a survival advantage of immediate treatment over delayed treatment for asymptomatic patients with early stage disease. As a result, treatment is generally reserved until the patient has active disease defined as the presence of disease-related symptoms: Weight loss greater than 10%; extreme fatigue; fever greater than 100.5 for 2 weeks with night sweats without evidence of infection (\"B\" symptoms); worsening cytopenias (HGB less than 11; platelets less than 100,000); unexplained recurrent infections; worsening adenopathy, or splenomegaly. She is aware to call should she develop any of these symptoms.    7.  Continue ongoing management per primary care.   7.  Return to the [place] office in 24 weeks with pre-office serum iron, Fe sat, ferritin, CBC and differential, IgG, LDH, B2M.    QUALITY MEASURES:   MEDICAL DECISION MAKING: Moderate Complexity   AMOUNT OF DATA:  Moderate    I spent 36 minutes caring for Liset on this date of service. This time includes time spent by me in the following activities: preparing for the visit, reviewing tests, performing a medically appropriate examination and/or evaluation, counseling and educating the patient/family/caregiver, ordering medications, tests, or procedures and documenting information in the medical record.       cc: Harry Hastings MD     "

## 2023-07-25 ENCOUNTER — HOSPITAL ENCOUNTER (OUTPATIENT)
Dept: CT IMAGING | Age: 73
Discharge: HOME | End: 2023-07-25
Payer: MEDICARE

## 2023-07-25 DIAGNOSIS — R91.8 LUNG NODULES: ICD-10-CM

## 2023-07-25 PROCEDURE — 71250 CT THORAX DX C-: CPT

## 2023-07-28 ENCOUNTER — TELEPHONE (OUTPATIENT)
Dept: PULMONOLOGY | Age: 73
End: 2023-07-28

## 2023-07-28 ENCOUNTER — OFFICE VISIT (OUTPATIENT)
Dept: ONCOLOGY | Facility: CLINIC | Age: 73
End: 2023-07-28
Payer: MEDICARE

## 2023-07-28 VITALS
TEMPERATURE: 97.5 F | DIASTOLIC BLOOD PRESSURE: 76 MMHG | SYSTOLIC BLOOD PRESSURE: 144 MMHG | RESPIRATION RATE: 18 BRPM | HEART RATE: 60 BPM | BODY MASS INDEX: 22.82 KG/M2 | HEIGHT: 65 IN | OXYGEN SATURATION: 98 % | WEIGHT: 137 LBS

## 2023-07-28 DIAGNOSIS — C91.10 CLL (CHRONIC LYMPHOCYTIC LEUKEMIA): Primary | ICD-10-CM

## 2023-07-28 NOTE — TELEPHONE ENCOUNTER
Gunnar with [facility] radiology called to find out if CT reults from 7/25 have been received and read. Per Gunnar imaging shows significant findings and requests  that Dr Jordy Ulloa reads report today. Per Gunnar he has called multiple times.

## 2023-08-01 ENCOUNTER — HOSPITAL ENCOUNTER (OUTPATIENT)
Dept: CT IMAGING | Age: 73
Discharge: HOME | End: 2023-08-01
Payer: MEDICARE

## 2023-08-01 ENCOUNTER — OFFICE VISIT (OUTPATIENT)
Dept: PULMONOLOGY | Age: 73
End: 2023-08-01
Payer: MEDICARE

## 2023-08-01 ENCOUNTER — TELEPHONE (OUTPATIENT)
Dept: ONCOLOGY | Facility: CLINIC | Age: 73
End: 2023-08-01
Payer: MEDICARE

## 2023-08-01 VITALS
DIASTOLIC BLOOD PRESSURE: 80 MMHG | WEIGHT: 137.5 LBS | OXYGEN SATURATION: 97 % | HEART RATE: 54 BPM | BODY MASS INDEX: 23.47 KG/M2 | SYSTOLIC BLOOD PRESSURE: 134 MMHG | TEMPERATURE: 97.1 F | HEIGHT: 64 IN

## 2023-08-01 DIAGNOSIS — R06.09 DOE (DYSPNEA ON EXERTION): ICD-10-CM

## 2023-08-01 DIAGNOSIS — D83.9 CVID (COMMON VARIABLE IMMUNODEFICIENCY) (HCC): ICD-10-CM

## 2023-08-01 DIAGNOSIS — R91.8 LUNG NODULES: ICD-10-CM

## 2023-08-01 DIAGNOSIS — R91.8 LUNG NODULES: Primary | ICD-10-CM

## 2023-08-01 DIAGNOSIS — Z14.8 ALPHA-1-ANTITRYPSIN DEFICIENCY CARRIER: ICD-10-CM

## 2023-08-01 PROCEDURE — 3017F COLORECTAL CA SCREEN DOC REV: CPT | Performed by: INTERNAL MEDICINE

## 2023-08-01 PROCEDURE — 6360000004 HC RX CONTRAST MEDICATION: Performed by: INTERNAL MEDICINE

## 2023-08-01 PROCEDURE — 71270 CT THORAX DX C-/C+: CPT

## 2023-08-01 PROCEDURE — 99214 OFFICE O/P EST MOD 30 MIN: CPT | Performed by: INTERNAL MEDICINE

## 2023-08-01 PROCEDURE — G8427 DOCREV CUR MEDS BY ELIG CLIN: HCPCS | Performed by: INTERNAL MEDICINE

## 2023-08-01 PROCEDURE — 1123F ACP DISCUSS/DSCN MKR DOCD: CPT | Performed by: INTERNAL MEDICINE

## 2023-08-01 PROCEDURE — G8420 CALC BMI NORM PARAMETERS: HCPCS | Performed by: INTERNAL MEDICINE

## 2023-08-01 PROCEDURE — 1036F TOBACCO NON-USER: CPT | Performed by: INTERNAL MEDICINE

## 2023-08-01 PROCEDURE — G8400 PT W/DXA NO RESULTS DOC: HCPCS | Performed by: INTERNAL MEDICINE

## 2023-08-01 PROCEDURE — 1090F PRES/ABSN URINE INCON ASSESS: CPT | Performed by: INTERNAL MEDICINE

## 2023-08-01 RX ADMIN — IOPAMIDOL 60 ML: 755 INJECTION, SOLUTION INTRAVENOUS at 11:47

## 2023-08-01 NOTE — PROGRESS NOTES
Pulmonary and Sleep Medicine    Farhad Trevizo (:  1950) is a 81 y.o. female,Established patient, here for evaluation of the following chief complaint(s):  Follow-up (3 Month follow up CT Chest )      Referring physician:  No referring provider defined for this encounter. ASSESSMENT/PLAN:  1. Lung nodules  -     CT CHEST W WO CONTRAST; Future  -     Case Request  2. Alpha-1-antitrypsin deficiency carrier  3. CVID (common variable immunodeficiency) ([address])  4. NY (dyspnea on exertion)        Lung nodules as seen on CT of the chest likely represent inflammatory infectious or malignant process. Malignancy seems to be less likely however. PET/CT will have limited role in management at this time. Positive CT cannot rule in malignancy or infection. A negative CT cannot rule out the other etiologies in the face of the new findings. Therefore biopsy is the preferred route. Discussed that the above with the patient. She is in agreement to proceed with biopsy. Salena Laureano MD, [facility], [facility]    Return in about 2 weeks (around 8/15/2023). SUBJECTIVE/OBJECTIVE:  She is here for follow up on lung nodules. She had a CT of the chest that did show increased nodular infiltrates bilaterally. She has a chronic cough. CT of the chest was reviewed by myself with the patient. Comparisons were made. Denies fever. Denies weight loss. Denies ever smoking. Prior to Visit Medications    Medication Sig Taking?  Authorizing Provider   omeprazole (PRILOSEC) 40 MG delayed release capsule Take 1 capsule by mouth in the morning and at bedtime  Ara Lafleur MD   dicyclomine (BENTYL) 10 MG capsule Take 1 capsule by mouth 2 times daily  Historical Provider, MD   metoprolol (LOPRESSOR) 100 MG tablet Take 1 tablet by mouth 2 times daily  Historical Provider, MD   valsartan (DIOVAN) 320 MG tablet Take 1 tablet by mouth daily  Historical Provider, MD   omeprazole (PRILOSEC) 40 MG delayed release

## 2023-08-03 ENCOUNTER — ANESTHESIA EVENT (OUTPATIENT)
Dept: ENDOSCOPY | Age: 73
End: 2023-08-03
Payer: MEDICARE

## 2023-08-03 ENCOUNTER — APPOINTMENT (OUTPATIENT)
Dept: GENERAL RADIOLOGY | Age: 73
End: 2023-08-03
Attending: INTERNAL MEDICINE
Payer: MEDICARE

## 2023-08-03 ENCOUNTER — ANESTHESIA (OUTPATIENT)
Dept: ENDOSCOPY | Age: 73
End: 2023-08-03
Payer: MEDICARE

## 2023-08-03 ENCOUNTER — HOSPITAL ENCOUNTER (OUTPATIENT)
Age: 73
Setting detail: OUTPATIENT SURGERY
Discharge: HOME | End: 2023-08-03
Attending: INTERNAL MEDICINE | Admitting: INTERNAL MEDICINE
Payer: MEDICARE

## 2023-08-03 ENCOUNTER — PREP FOR PROCEDURE (OUTPATIENT)
Dept: PULMONOLOGY | Age: 73
End: 2023-08-03

## 2023-08-03 VITALS
BODY MASS INDEX: 23.39 KG/M2 | OXYGEN SATURATION: 99 % | TEMPERATURE: 96.9 F | HEIGHT: 64 IN | WEIGHT: 137 LBS | DIASTOLIC BLOOD PRESSURE: 60 MMHG | HEART RATE: 63 BPM | SYSTOLIC BLOOD PRESSURE: 136 MMHG | RESPIRATION RATE: 16 BRPM

## 2023-08-03 DIAGNOSIS — R91.8 LUNG MASS: ICD-10-CM

## 2023-08-03 LAB
KOH PREP SPEC: NORMAL
KOH PREP SPEC: NORMAL
Lab: NORMAL
REPORT: NORMAL
THIS TEST SENT TO: NORMAL

## 2023-08-03 PROCEDURE — 87102 FUNGUS ISOLATION CULTURE: CPT

## 2023-08-03 PROCEDURE — 87116 MYCOBACTERIA CULTURE: CPT

## 2023-08-03 PROCEDURE — 31628 BRONCHOSCOPY/LUNG BX EACH: CPT | Performed by: INTERNAL MEDICINE

## 2023-08-03 PROCEDURE — 87205 SMEAR GRAM STAIN: CPT

## 2023-08-03 PROCEDURE — 87070 CULTURE OTHR SPECIMN AEROBIC: CPT

## 2023-08-03 PROCEDURE — 6360000002 HC RX W HCPCS: Performed by: NURSE ANESTHETIST, CERTIFIED REGISTERED

## 2023-08-03 PROCEDURE — 88112 CYTOPATH CELL ENHANCE TECH: CPT

## 2023-08-03 PROCEDURE — 7100000001 HC PACU RECOVERY - ADDTL 15 MIN: Performed by: INTERNAL MEDICINE

## 2023-08-03 PROCEDURE — C9399 UNCLASSIFIED DRUGS OR BIOLOG: HCPCS | Performed by: NURSE ANESTHETIST, CERTIFIED REGISTERED

## 2023-08-03 PROCEDURE — 88305 TISSUE EXAM BY PATHOLOGIST: CPT

## 2023-08-03 PROCEDURE — 31624 DX BRONCHOSCOPE/LAVAGE: CPT | Performed by: INTERNAL MEDICINE

## 2023-08-03 PROCEDURE — 81479 UNLISTED MOLECULAR PATHOLOGY: CPT

## 2023-08-03 PROCEDURE — 7100000011 HC PHASE II RECOVERY - ADDTL 15 MIN: Performed by: INTERNAL MEDICINE

## 2023-08-03 PROCEDURE — 71045 X-RAY EXAM CHEST 1 VIEW: CPT

## 2023-08-03 PROCEDURE — 87186 SC STD MICRODIL/AGAR DIL: CPT

## 2023-08-03 PROCEDURE — 87449 NOS EACH ORGANISM AG IA: CPT

## 2023-08-03 PROCEDURE — 2580000003 HC RX 258: Performed by: INTERNAL MEDICINE

## 2023-08-03 PROCEDURE — 3700000001 HC ADD 15 MINUTES (ANESTHESIA): Performed by: INTERNAL MEDICINE

## 2023-08-03 PROCEDURE — 81406 MOPATH PROCEDURE LEVEL 7: CPT

## 2023-08-03 PROCEDURE — 2500000003 HC RX 250 WO HCPCS: Performed by: NURSE ANESTHETIST, CERTIFIED REGISTERED

## 2023-08-03 PROCEDURE — 81201 APC GENE FULL SEQUENCE: CPT

## 2023-08-03 PROCEDURE — 3700000000 HC ANESTHESIA ATTENDED CARE: Performed by: INTERNAL MEDICINE

## 2023-08-03 PROCEDURE — 3609155400 HC ADD ON COMPUTER ASSISTED NAVIGATION: Performed by: INTERNAL MEDICINE

## 2023-08-03 PROCEDURE — 87206 SMEAR FLUORESCENT/ACID STAI: CPT

## 2023-08-03 PROCEDURE — 81351 TP53 GENE FULL GENE SEQUENCE: CPT

## 2023-08-03 PROCEDURE — 81206 BCR/ABL1 GENE MAJOR BP: CPT

## 2023-08-03 PROCEDURE — 3609027000 HC BRONCHOSCOPY: Performed by: INTERNAL MEDICINE

## 2023-08-03 PROCEDURE — 87305 ASPERGILLUS AG IA: CPT

## 2023-08-03 PROCEDURE — 81404 MOPATH PROCEDURE LEVEL 5: CPT

## 2023-08-03 PROCEDURE — 87075 CULTR BACTERIA EXCEPT BLOOD: CPT

## 2023-08-03 PROCEDURE — 2720000010 HC SURG SUPPLY STERILE: Performed by: INTERNAL MEDICINE

## 2023-08-03 PROCEDURE — 7100000010 HC PHASE II RECOVERY - FIRST 15 MIN: Performed by: INTERNAL MEDICINE

## 2023-08-03 PROCEDURE — 2709999900 HC NON-CHARGEABLE SUPPLY: Performed by: INTERNAL MEDICINE

## 2023-08-03 PROCEDURE — 7100000000 HC PACU RECOVERY - FIRST 15 MIN: Performed by: INTERNAL MEDICINE

## 2023-08-03 PROCEDURE — 31627 NAVIGATIONAL BRONCHOSCOPY: CPT | Performed by: INTERNAL MEDICINE

## 2023-08-03 RX ORDER — SODIUM CHLORIDE, SODIUM LACTATE, POTASSIUM CHLORIDE, CALCIUM CHLORIDE 600; 310; 30; 20 MG/100ML; MG/100ML; MG/100ML; MG/100ML
INJECTION, SOLUTION INTRAVENOUS CONTINUOUS
Status: DISCONTINUED | OUTPATIENT
Start: 2023-08-03 | End: 2023-08-03 | Stop reason: HOSPADM

## 2023-08-03 RX ORDER — HYDRALAZINE HYDROCHLORIDE 20 MG/ML
INJECTION INTRAMUSCULAR; INTRAVENOUS PRN
Status: DISCONTINUED | OUTPATIENT
Start: 2023-08-03 | End: 2023-08-03 | Stop reason: SDUPTHER

## 2023-08-03 RX ORDER — SODIUM CHLORIDE 0.9 % (FLUSH) 0.9 %
5-40 SYRINGE (ML) INJECTION EVERY 12 HOURS SCHEDULED
Status: DISCONTINUED | OUTPATIENT
Start: 2023-08-03 | End: 2023-08-03 | Stop reason: HOSPADM

## 2023-08-03 RX ORDER — ROCURONIUM BROMIDE 10 MG/ML
INJECTION, SOLUTION INTRAVENOUS PRN
Status: DISCONTINUED | OUTPATIENT
Start: 2023-08-03 | End: 2023-08-03 | Stop reason: SDUPTHER

## 2023-08-03 RX ORDER — DEXAMETHASONE SODIUM PHOSPHATE 10 MG/ML
INJECTION, SOLUTION INTRAMUSCULAR; INTRAVENOUS PRN
Status: DISCONTINUED | OUTPATIENT
Start: 2023-08-03 | End: 2023-08-03 | Stop reason: SDUPTHER

## 2023-08-03 RX ORDER — PROPOFOL 10 MG/ML
INJECTION, EMULSION INTRAVENOUS PRN
Status: DISCONTINUED | OUTPATIENT
Start: 2023-08-03 | End: 2023-08-03 | Stop reason: SDUPTHER

## 2023-08-03 RX ORDER — FENTANYL CITRATE 50 UG/ML
25 INJECTION, SOLUTION INTRAMUSCULAR; INTRAVENOUS EVERY 5 MIN PRN
Status: DISCONTINUED | OUTPATIENT
Start: 2023-08-03 | End: 2023-08-03 | Stop reason: HOSPADM

## 2023-08-03 RX ORDER — SODIUM CHLORIDE 0.9 % (FLUSH) 0.9 %
5-40 SYRINGE (ML) INJECTION PRN
Status: DISCONTINUED | OUTPATIENT
Start: 2023-08-03 | End: 2023-08-03 | Stop reason: HOSPADM

## 2023-08-03 RX ORDER — LIDOCAINE HYDROCHLORIDE 10 MG/ML
INJECTION, SOLUTION EPIDURAL; INFILTRATION; INTRACAUDAL; PERINEURAL PRN
Status: DISCONTINUED | OUTPATIENT
Start: 2023-08-03 | End: 2023-08-03 | Stop reason: SDUPTHER

## 2023-08-03 RX ORDER — SODIUM CHLORIDE 9 MG/ML
INJECTION, SOLUTION INTRAVENOUS PRN
Status: DISCONTINUED | OUTPATIENT
Start: 2023-08-03 | End: 2023-08-03 | Stop reason: HOSPADM

## 2023-08-03 RX ORDER — FENTANYL CITRATE 50 UG/ML
INJECTION, SOLUTION INTRAMUSCULAR; INTRAVENOUS PRN
Status: DISCONTINUED | OUTPATIENT
Start: 2023-08-03 | End: 2023-08-03 | Stop reason: SDUPTHER

## 2023-08-03 RX ORDER — EPHEDRINE SULFATE 50 MG/ML
INJECTION, SOLUTION INTRAVENOUS PRN
Status: DISCONTINUED | OUTPATIENT
Start: 2023-08-03 | End: 2023-08-03 | Stop reason: SDUPTHER

## 2023-08-03 RX ORDER — ONDANSETRON 2 MG/ML
INJECTION INTRAMUSCULAR; INTRAVENOUS PRN
Status: DISCONTINUED | OUTPATIENT
Start: 2023-08-03 | End: 2023-08-03 | Stop reason: SDUPTHER

## 2023-08-03 RX ADMIN — EPHEDRINE SULFATE 10 MG: 50 INJECTION INTRAMUSCULAR; INTRAVENOUS; SUBCUTANEOUS at 14:59

## 2023-08-03 RX ADMIN — PROPOFOL 150 MG: 10 INJECTION, EMULSION INTRAVENOUS at 14:41

## 2023-08-03 RX ADMIN — ONDANSETRON 4 MG: 2 INJECTION INTRAMUSCULAR; INTRAVENOUS at 15:39

## 2023-08-03 RX ADMIN — ROCURONIUM BROMIDE 50 MG: 10 INJECTION, SOLUTION INTRAVENOUS at 14:41

## 2023-08-03 RX ADMIN — PROPOFOL 50 MG: 10 INJECTION, EMULSION INTRAVENOUS at 14:43

## 2023-08-03 RX ADMIN — SODIUM CHLORIDE, SODIUM LACTATE, POTASSIUM CHLORIDE, AND CALCIUM CHLORIDE: 600; 310; 30; 20 INJECTION, SOLUTION INTRAVENOUS at 11:33

## 2023-08-03 RX ADMIN — FENTANYL CITRATE 50 MCG: 50 INJECTION INTRAMUSCULAR; INTRAVENOUS at 14:41

## 2023-08-03 RX ADMIN — LIDOCAINE HYDROCHLORIDE 50 MG: 10 INJECTION, SOLUTION EPIDURAL; INFILTRATION; INTRACAUDAL; PERINEURAL at 14:41

## 2023-08-03 RX ADMIN — EPHEDRINE SULFATE 10 MG: 50 INJECTION INTRAMUSCULAR; INTRAVENOUS; SUBCUTANEOUS at 15:19

## 2023-08-03 RX ADMIN — SODIUM CHLORIDE, SODIUM LACTATE, POTASSIUM CHLORIDE, AND CALCIUM CHLORIDE: 600; 310; 30; 20 INJECTION, SOLUTION INTRAVENOUS at 15:32

## 2023-08-03 RX ADMIN — DEXAMETHASONE SODIUM PHOSPHATE 10 MG: 10 INJECTION, SOLUTION INTRAMUSCULAR; INTRAVENOUS at 14:56

## 2023-08-03 RX ADMIN — FENTANYL CITRATE 50 MCG: 50 INJECTION INTRAMUSCULAR; INTRAVENOUS at 15:44

## 2023-08-03 RX ADMIN — HYDRALAZINE HYDROCHLORIDE 5 MG: 20 INJECTION INTRAMUSCULAR; INTRAVENOUS at 14:46

## 2023-08-03 RX ADMIN — SUGAMMADEX 200 MG: 100 INJECTION, SOLUTION INTRAVENOUS at 15:37

## 2023-08-03 RX ADMIN — HYDRALAZINE HYDROCHLORIDE 5 MG: 20 INJECTION INTRAMUSCULAR; INTRAVENOUS at 15:05

## 2023-08-03 ASSESSMENT — PAIN SCALES - GENERAL
PAINLEVEL_OUTOF10: 0
PAINLEVEL_OUTOF10: 0

## 2023-08-03 ASSESSMENT — PAIN - FUNCTIONAL ASSESSMENT: PAIN_FUNCTIONAL_ASSESSMENT: 0-10

## 2023-08-03 ASSESSMENT — ENCOUNTER SYMPTOMS: SHORTNESS OF BREATH: 1

## 2023-08-03 ASSESSMENT — PAIN DESCRIPTION - DESCRIPTORS: DESCRIPTORS: ACHING

## 2023-08-03 NOTE — INTERVAL H&P NOTE
Update History & Physical    The patient's History and Physical of August 1, 2023 was reviewed with the patient and I examined the patient. There was no change. The surgical site was confirmed by the patient and me. Plan: The risks, benefits, expected outcome, and alternative to the recommended procedure have been discussed with the patient. Patient understands and wants to proceed with the procedure.      Electronically signed by Fernando Moncada MD on 8/3/2023 at 2:38 PM

## 2023-08-03 NOTE — H&P
Pulmonary and Sleep Medicine    Ashley Baumann (:  1950) is a 81 y.o. female,Established patient, here for evaluation of the following chief complaint(s):  Follow-up (Follow up- Recurrent respiratory infection (did not start ivig) )      Referring physician:  No referring provider defined for this encounter. ASSESSMENT/PLAN:  1. [place] (dyspnea on exertion)  2. CVID (common variable immunodeficiency) ([address])  3. Alpha-1-antitrypsin deficiency carrier  4. Gastroesophageal reflux disease without esophagitis  5. Recurrent respiratory infection  6. Chronic cough        The patient is asymptomatic at this time. We will continue to observe. Follow-up in 6 months. Kristin Levy MD, [facility], [facility]    Return in about 6 months (around 10/25/2023). SUBJECTIVE/OBJECTIVE:  The patient is here for follow-up on dyspnea on exertion and, vitamin D deficiency. She denies any respiratory complaints at this time. She has not been using the Trelegy inhaler. She has a rescue inhaler that she uses rarely. She has not started on IVIG due to cost.  However she does not endorse any chest infection since her last visit. Prior to Visit Medications    Medication Sig Taking?  Authorizing Provider   fluticasone-umeclidin-vilant (TRELEGY ELLIPTA) 100-62.5-25 MCG/ACT AEPB inhaler Inhale 1 puff into the lungs daily Yes Ara Lafleur MD   omeprazole (PRILOSEC) 40 MG delayed release capsule Take 1 capsule by mouth in the morning and at bedtime Yes Ara Lafleur MD   dicyclomine (BENTYL) 10 MG capsule Take 1 capsule by mouth 2 times daily Yes Historical Provider, MD   metoprolol (LOPRESSOR) 100 MG tablet Take 1 tablet by mouth 2 times daily Yes Historical Provider, MD   valsartan (DIOVAN) 320 MG tablet Take 1 tablet by mouth daily Yes Historical Provider, MD   omeprazole (PRILOSEC) 40 MG delayed release capsule Take 1 capsule by mouth daily Yes Historical Provider, MD        Review of Systems

## 2023-08-03 NOTE — ANESTHESIA PRE PROCEDURE
POCCL, POCBUN, POCHEMO, POCHCT in the last 72 hours. Coags: No results found for: PROTIME, INR, APTT    HCG (If Applicable): No results found for: PREGTESTUR, PREGSERUM, HCG, HCGQUANT     ABGs: No results found for: PHART, PO2ART, RLA4SJG, AMF6VUD, BEART, Q1DAHVHO     Type & Screen (If Applicable):  No results found for: LABABO, LABRH    Drug/Infectious Status (If Applicable):  No results found for: HIV, HEPCAB    COVID-19 Screening (If Applicable):   Lab Results   Component Value Date/Time    COVID19 Not Detected 01/09/2023 09:58 AM           Anesthesia Evaluation  Patient summary reviewed no history of anesthetic complications:   Airway: Mallampati: I     Neck ROM: full  Mouth opening: > = 3 FB   Dental:          Pulmonary:   (+) pneumonia:  shortness of breath:  asthma:                           ROS comment: Alpha 1 antitrypsin deficiency     Cardiovascular:  Exercise tolerance: good (>4 METS),   (+) hypertension:, [place]:,         Rhythm: regular  Rate: normal           Beta Blocker:  Dose within 24 Hrs         Neuro/Psych:   (+) headaches:,             GI/Hepatic/Renal:   (+) GERD:,           Endo/Other:                     Abdominal:             Vascular: Other Findings:           Anesthesia Plan      general     ASA 3       Induction: intravenous. Anesthetic plan and risks discussed with patient. Use of blood products discussed with patient whom consented to blood products. Plan discussed with CRNA.     Attending anesthesiologist reviewed and agrees with Preprocedure content                ADOLFO Wilson - CRNA   8/3/2023

## 2023-08-03 NOTE — PROCEDURES
After consent and under general anesthesia the patient underwent bronchoscopy through the endotracheal tube. Distal trachea was normal.  Mohini was sharp and midline. Right and left bronchial trees were explored. There was no evidence of endobronchial disease. The [facility] navigational robotic bronchoscope was used to navigate to the right upper lobe lesion. Navigation was achieved successfully. Transbronchial biopsies were done from the right upper lobe lesion and submitted in formalin and in saline. Bronchoalveolar lavage was also obtained from the right upper lobe. No immediate postoperative complications noted. Complications: None. Estimated blood loss: 1 cc. Specimen: None  1. Right upper lobe transbronchial biopsies times 6 and saline for cultures and microbiology. 2.  Right upper lobe transbronchial biopsies x4 in formalin. 3.  Right upper lobe bronchoalveolar lavage for microbiology and cytology.   Disposition: Post anesthesia recovery per anesthesia service

## 2023-08-05 LAB
ACID FAST STN SPEC QL: NORMAL
BACTERIA SPEC RESP CULT: ABNORMAL
BACTERIA SPEC RESP CULT: ABNORMAL
GRAM STN SPEC: ABNORMAL
ORGANISM: ABNORMAL

## 2023-08-06 LAB
BACTERIA SPEC AEROBE CULT: ABNORMAL
BACTERIA SPEC AEROBE CULT: ABNORMAL
BACTERIA SPEC ANAEROBE CULT: ABNORMAL
GRAM STN SPEC: ABNORMAL
ORGANISM: ABNORMAL
ORGANISM: ABNORMAL

## 2023-08-08 LAB
B DERMAT AG SER QL IA: NORMAL NG/ML
MISCELLANEOUS LAB TEST RESULT: NORMAL

## 2023-08-09 ENCOUNTER — TELEPHONE (OUTPATIENT)
Dept: PULMONOLOGY | Facility: CLINIC | Age: 73
End: 2023-08-09
Payer: MEDICARE

## 2023-08-09 NOTE — TELEPHONE ENCOUNTER
I called the patient to reschedule the cancelled appointment.  The patient has given the following reason for why the cancelled appointment has not been rescheduled: Patient has decided to see another provider.

## 2023-08-15 LAB
FUNGUS SPEC CULT: NORMAL
FUNGUS SPEC CULT: NORMAL
KOH PREP SPEC: NORMAL
KOH PREP SPEC: NORMAL

## 2023-08-16 LAB
ACID FAST STN SPEC QL: NORMAL
MYCOBACTERIUM SPEC CULT: NORMAL

## 2023-08-21 ENCOUNTER — OFFICE VISIT (OUTPATIENT)
Dept: PULMONOLOGY | Age: 73
End: 2023-08-21
Payer: MEDICARE

## 2023-08-21 VITALS
BODY MASS INDEX: 23.73 KG/M2 | WEIGHT: 139 LBS | DIASTOLIC BLOOD PRESSURE: 80 MMHG | HEIGHT: 64 IN | SYSTOLIC BLOOD PRESSURE: 150 MMHG | TEMPERATURE: 98.2 F | HEART RATE: 54 BPM | OXYGEN SATURATION: 98 %

## 2023-08-21 DIAGNOSIS — R91.8 LUNG NODULES: Primary | ICD-10-CM

## 2023-08-21 DIAGNOSIS — J18.9 PNEUMONIA OF RIGHT MIDDLE LOBE DUE TO INFECTIOUS ORGANISM: ICD-10-CM

## 2023-08-21 DIAGNOSIS — R06.09 DOE (DYSPNEA ON EXERTION): ICD-10-CM

## 2023-08-21 DIAGNOSIS — D83.9 CVID (COMMON VARIABLE IMMUNODEFICIENCY) (HCC): ICD-10-CM

## 2023-08-21 DIAGNOSIS — Z14.8 ALPHA-1-ANTITRYPSIN DEFICIENCY CARRIER: ICD-10-CM

## 2023-08-21 DIAGNOSIS — C91.10 CLL (CHRONIC LYMPHOCYTIC LEUKEMIA) (HCC): ICD-10-CM

## 2023-08-21 PROCEDURE — G8420 CALC BMI NORM PARAMETERS: HCPCS | Performed by: INTERNAL MEDICINE

## 2023-08-21 PROCEDURE — 1090F PRES/ABSN URINE INCON ASSESS: CPT | Performed by: INTERNAL MEDICINE

## 2023-08-21 PROCEDURE — 99214 OFFICE O/P EST MOD 30 MIN: CPT | Performed by: INTERNAL MEDICINE

## 2023-08-21 PROCEDURE — G8427 DOCREV CUR MEDS BY ELIG CLIN: HCPCS | Performed by: INTERNAL MEDICINE

## 2023-08-21 PROCEDURE — G8400 PT W/DXA NO RESULTS DOC: HCPCS | Performed by: INTERNAL MEDICINE

## 2023-08-21 PROCEDURE — 1123F ACP DISCUSS/DSCN MKR DOCD: CPT | Performed by: INTERNAL MEDICINE

## 2023-08-21 PROCEDURE — 3017F COLORECTAL CA SCREEN DOC REV: CPT | Performed by: INTERNAL MEDICINE

## 2023-08-21 PROCEDURE — 1036F TOBACCO NON-USER: CPT | Performed by: INTERNAL MEDICINE

## 2023-08-21 RX ORDER — CEFUROXIME AXETIL 500 MG/1
500 TABLET ORAL 2 TIMES DAILY
Qty: 14 TABLET | Refills: 0 | Status: SHIPPED | OUTPATIENT
Start: 2023-08-21 | End: 2023-08-28

## 2023-08-21 ASSESSMENT — ENCOUNTER SYMPTOMS
CHEST TIGHTNESS: 0
RHINORRHEA: 0
APNEA: 0
ABDOMINAL DISTENTION: 0
ANAL BLEEDING: 0
ABDOMINAL PAIN: 0
SHORTNESS OF BREATH: 0
WHEEZING: 0
COUGH: 1
BACK PAIN: 0

## 2023-08-21 NOTE — PROGRESS NOTES
Pulmonary and Sleep Medicine    Peg Dewey (:  1950) is a 81 y.o. female,Established patient, here for evaluation of the following chief complaint(s):  Follow-up (2 wk fu; [facility])      Referring physician:  No referring provider defined for this encounter. ASSESSMENT/PLAN:  1. Lung nodules  -     CT CHEST WO CONTRAST; Future  2. Alpha-1-antitrypsin deficiency carrier  3. CVID (common variable immunodeficiency) ([address])  4. [place] (dyspnea on exertion)  5. Pneumonia of right middle lobe due to infectious organism  -     cefUROXime (CEFTIN) 500 MG tablet; Take 1 tablet by mouth 2 times daily for 7 days, Disp-14 tablet, R-0Normal  6. CLL (chronic lymphocytic leukemia) ([facility])        Cultures were positive for multi bacterial organisms suggestive of possible contamination however cannot rule out an actual infection. Will start empirically on antibiotics as above. We will repeat CT of the chest in 4 weeks and follow-up at that time. She does have common variable immune deficiency. This is a risk for recurrent infections. Will defer to hematology for possible IGG infusion. Scarlet Hidalgo MD, FCCP, DAB    Return in about 4 weeks (around 2023). SUBJECTIVE/OBJECTIVE:  The patient is here for follow up on lung infiltrates. Had bronchoscopy done. She had multiple bacterial growth on culture. She denies any fevers. She continues to cough. Her cough is nonproductive. Her AFB is negative so far. Galactomannan's were negative. Biopsy showed no evidence of malignancy however there was evidence of acute inflammation and background of fibrotic changes. Prior to Visit Medications    Medication Sig Taking?  Authorizing Provider   omeprazole (PRILOSEC) 40 MG delayed release capsule Take 1 capsule by mouth in the morning and at bedtime  Ara Lafleur MD   dicyclomine (BENTYL) 10 MG capsule Take 1 capsule by mouth 2 times daily  Historical Provider, MD   metoprolol (LOPRESSOR)

## 2023-08-22 LAB
FUNGUS SPEC CULT: NORMAL
FUNGUS SPEC CULT: NORMAL
KOH PREP SPEC: NORMAL
KOH PREP SPEC: NORMAL

## 2023-08-23 LAB
ACID FAST STN SPEC QL: NORMAL
MYCOBACTERIUM SPEC CULT: NORMAL

## 2023-08-29 LAB
FUNGUS SPEC CULT: NORMAL
FUNGUS SPEC CULT: NORMAL
KOH PREP SPEC: NORMAL
KOH PREP SPEC: NORMAL

## 2023-08-30 LAB
ACID FAST STN SPEC QL: NORMAL
MYCOBACTERIUM SPEC CULT: NORMAL

## 2023-09-05 LAB
FUNGUS SPEC CULT: NORMAL
FUNGUS SPEC CULT: NORMAL
KOH PREP SPEC: NORMAL
KOH PREP SPEC: NORMAL

## 2023-09-06 LAB
ACID FAST STN SPEC QL: NORMAL
MYCOBACTERIUM SPEC CULT: NORMAL

## 2023-09-13 LAB
ACID FAST STN SPEC QL: NORMAL
MYCOBACTERIUM SPEC CULT: NORMAL

## 2023-09-20 LAB
ACID FAST STN SPEC QL: NORMAL
MYCOBACTERIUM SPEC CULT: NORMAL

## 2023-09-25 ENCOUNTER — HOSPITAL ENCOUNTER (OUTPATIENT)
Dept: CT IMAGING | Age: 73
Discharge: HOME | End: 2023-09-25
Payer: MEDICARE

## 2023-09-25 DIAGNOSIS — R91.8 LUNG NODULES: ICD-10-CM

## 2023-09-25 PROCEDURE — 71250 CT THORAX DX C-: CPT

## 2023-09-28 RX ORDER — ALBUTEROL SULFATE 90 UG/1
2 AEROSOL, METERED RESPIRATORY (INHALATION) EVERY 4 HOURS PRN
COMMUNITY
Start: 2022-08-10

## 2023-09-28 RX ORDER — DOXYCYCLINE HYCLATE 100 MG
TABLET ORAL
COMMUNITY
Start: 2023-07-13

## 2023-09-28 RX ORDER — MELOXICAM 7.5 MG/1
7.5 TABLET ORAL DAILY
COMMUNITY
Start: 2023-06-29

## 2023-10-03 ENCOUNTER — TELEPHONE (OUTPATIENT)
Dept: ONCOLOGY | Facility: CLINIC | Age: 73
End: 2023-10-03
Payer: MEDICARE

## 2023-10-03 ENCOUNTER — OFFICE VISIT (OUTPATIENT)
Dept: PULMONOLOGY | Age: 73
End: 2023-10-03
Payer: MEDICARE

## 2023-10-03 VITALS
BODY MASS INDEX: 23.16 KG/M2 | WEIGHT: 139 LBS | HEIGHT: 65 IN | DIASTOLIC BLOOD PRESSURE: 79 MMHG | HEART RATE: 56 BPM | OXYGEN SATURATION: 99 % | SYSTOLIC BLOOD PRESSURE: 172 MMHG | TEMPERATURE: 97 F

## 2023-10-03 DIAGNOSIS — J18.9 PNEUMONIA OF RIGHT MIDDLE LOBE DUE TO INFECTIOUS ORGANISM: ICD-10-CM

## 2023-10-03 DIAGNOSIS — C91.10 CLL (CHRONIC LYMPHOCYTIC LEUKEMIA) (HCC): ICD-10-CM

## 2023-10-03 DIAGNOSIS — R91.8 LUNG NODULES: Primary | ICD-10-CM

## 2023-10-03 DIAGNOSIS — D83.9 CVID (COMMON VARIABLE IMMUNODEFICIENCY) (HCC): ICD-10-CM

## 2023-10-03 DIAGNOSIS — R06.09 DOE (DYSPNEA ON EXERTION): ICD-10-CM

## 2023-10-03 DIAGNOSIS — Z14.8 ALPHA-1-ANTITRYPSIN DEFICIENCY CARRIER: ICD-10-CM

## 2023-10-03 PROCEDURE — 1036F TOBACCO NON-USER: CPT | Performed by: INTERNAL MEDICINE

## 2023-10-03 PROCEDURE — G8400 PT W/DXA NO RESULTS DOC: HCPCS | Performed by: INTERNAL MEDICINE

## 2023-10-03 PROCEDURE — G8420 CALC BMI NORM PARAMETERS: HCPCS | Performed by: INTERNAL MEDICINE

## 2023-10-03 PROCEDURE — 1090F PRES/ABSN URINE INCON ASSESS: CPT | Performed by: INTERNAL MEDICINE

## 2023-10-03 PROCEDURE — 99213 OFFICE O/P EST LOW 20 MIN: CPT | Performed by: INTERNAL MEDICINE

## 2023-10-03 PROCEDURE — G8427 DOCREV CUR MEDS BY ELIG CLIN: HCPCS | Performed by: INTERNAL MEDICINE

## 2023-10-03 PROCEDURE — G8484 FLU IMMUNIZE NO ADMIN: HCPCS | Performed by: INTERNAL MEDICINE

## 2023-10-03 PROCEDURE — 3017F COLORECTAL CA SCREEN DOC REV: CPT | Performed by: INTERNAL MEDICINE

## 2023-10-03 PROCEDURE — 1123F ACP DISCUSS/DSCN MKR DOCD: CPT | Performed by: INTERNAL MEDICINE

## 2023-10-03 ASSESSMENT — ENCOUNTER SYMPTOMS
ANAL BLEEDING: 0
WHEEZING: 0
ABDOMINAL PAIN: 0
RHINORRHEA: 0
ABDOMINAL DISTENTION: 0
BACK PAIN: 0
APNEA: 0
CHEST TIGHTNESS: 0
COUGH: 1
SHORTNESS OF BREATH: 0

## 2023-10-03 NOTE — PROGRESS NOTES
Pulmonary and Sleep Medicine    Ramiro Stevens (:  1950) is a 72 y.o. female,Established patient, here for evaluation of the following chief complaint(s):  Follow-up (4 wk fu; pt has an head ache )      Referring physician:  No referring provider defined for this encounter. ASSESSMENT/PLAN:  1. Lung nodules  -     CT CHEST WO CONTRAST; Future  2. Alpha-1-antitrypsin deficiency carrier  3. CVID (common variable immunodeficiency) ([address])  4. Pneumonia of right middle lobe due to infectious organism  5. CLL (chronic lymphocytic leukemia) ([address])  6. [place] (dyspnea on exertion)        We reviewed the CT images with the patient. There is definite clearing of the prior infiltrate. She does have low IgG level. She also has CLL which predisposes her to recurrent infections. We discussed starting IVIG. I will discuss this with Dr. Svetlana Dietz her hematologist at [facility]. We will repeat CT of the chest in 6 months to assure stability of her nodules. Rocky Porter MD, FCCP, DAB    Return in about 6 months (around 4/3/2024). SUBJECTIVE/OBJECTIVE:  The patient is here for follow-up on lung nodules and recurrent upper respiratory tract and lower respiratory tract infections. She had a CT of the chest done on the  of last month that showed significant improvement in the prior infiltrate seen in the right upper lobe. She does have persistent tiny bilateral lung nodules. She contracted another upper respiratory infection from her grandkids. She is doing well at this time. Prior to Visit Medications    Medication Sig Taking?  Authorizing Provider   albuterol sulfate HFA (PROVENTIL;VENTOLIN;PROAIR) 108 (90 Base) MCG/ACT inhaler Inhale 2 puffs into the lungs every 4 hours as needed Yes Historical Provider, MD   doxycycline hyclate (VIBRA-TABS) 100 MG tablet TAKE 1 TABLET BY MOUTH TWICE A DAY FOR INFECTION  Historical Provider, MD   meloxicam (MOBIC) 7.5 MG tablet Take 1 tablet by

## 2023-10-03 NOTE — TELEPHONE ENCOUNTER
Call from Dr. Vasquze inquiring about the role of IVIG given patients recurrent lung infections.  We discussed most recent IgG of >600 last 7/2023.  Nonetheless, we agreed that we would see the patient back for earlier follow-up to reassess her counts and IgG levels before considering the option of IVIG

## 2023-10-11 ENCOUNTER — LAB (OUTPATIENT)
Dept: LAB | Facility: HOSPITAL | Age: 73
End: 2023-10-11
Payer: MEDICARE

## 2023-10-11 ENCOUNTER — TELEPHONE (OUTPATIENT)
Dept: ONCOLOGY | Facility: CLINIC | Age: 73
End: 2023-10-11
Payer: MEDICARE

## 2023-10-11 DIAGNOSIS — C91.10 CLL (CHRONIC LYMPHOCYTIC LEUKEMIA): ICD-10-CM

## 2023-10-11 DIAGNOSIS — C91.10 CLL (CHRONIC LYMPHOCYTIC LEUKEMIA): Primary | ICD-10-CM

## 2023-10-11 LAB
ALBUMIN SERPL-MCNC: 4.3 G/DL (ref 3.5–5.2)
ALBUMIN/GLOB SERPL: 2 G/DL
ALP SERPL-CCNC: 105 U/L (ref 39–117)
ALT SERPL W P-5'-P-CCNC: 11 U/L (ref 1–33)
ANION GAP SERPL CALCULATED.3IONS-SCNC: 9 MMOL/L (ref 5–15)
AST SERPL-CCNC: 16 U/L (ref 1–32)
B2 MICROGLOB SERPL-MCNC: 1.6 MG/L (ref 0.8–2.2)
BILIRUB SERPL-MCNC: 0.6 MG/DL (ref 0–1.2)
BUN SERPL-MCNC: 16 MG/DL (ref 8–23)
BUN/CREAT SERPL: 23.5 (ref 7–25)
CALCIUM SPEC-SCNC: 9.3 MG/DL (ref 8.6–10.5)
CHLORIDE SERPL-SCNC: 104 MMOL/L (ref 98–107)
CO2 SERPL-SCNC: 28 MMOL/L (ref 22–29)
CREAT SERPL-MCNC: 0.68 MG/DL (ref 0.57–1)
DEPRECATED RDW RBC AUTO: 44.2 FL (ref 37–54)
EGFRCR SERPLBLD CKD-EPI 2021: 93.2 ML/MIN/1.73
ERYTHROCYTE [DISTWIDTH] IN BLOOD BY AUTOMATED COUNT: 13 % (ref 12.3–15.4)
FERRITIN SERPL-MCNC: 136.8 NG/ML (ref 13–150)
GLOBULIN UR ELPH-MCNC: 2.2 GM/DL
GLUCOSE SERPL-MCNC: 113 MG/DL (ref 65–99)
HCT VFR BLD AUTO: 36.3 % (ref 34–46.6)
HGB BLD-MCNC: 11.4 G/DL (ref 12–15.9)
IGG1 SER-MCNC: 601 MG/DL (ref 700–1600)
IRON 24H UR-MRATE: 78 MCG/DL (ref 37–145)
IRON SATN MFR SERPL: 25 % (ref 20–50)
LDH SERPL-CCNC: 224 U/L (ref 135–214)
LYMPHOCYTES # BLD MANUAL: 12.17 10*3/MM3 (ref 0.7–3.1)
LYMPHOCYTES NFR BLD MANUAL: 1 % (ref 5–12)
MCH RBC QN AUTO: 29.2 PG (ref 26.6–33)
MCHC RBC AUTO-ENTMCNC: 31.4 G/DL (ref 31.5–35.7)
MCV RBC AUTO: 93.1 FL (ref 79–97)
MONOCYTES # BLD: 0.15 10*3/MM3 (ref 0.1–0.9)
NEUTROPHILS # BLD AUTO: 2.93 10*3/MM3 (ref 1.7–7)
NEUTROPHILS NFR BLD MANUAL: 19.2 % (ref 42.7–76)
OVALOCYTES BLD QL SMEAR: ABNORMAL
PLAT MORPH BLD: NORMAL
PLATELET # BLD AUTO: 259 10*3/MM3 (ref 140–450)
PMV BLD AUTO: 9.3 FL (ref 6–12)
POIKILOCYTOSIS BLD QL SMEAR: ABNORMAL
POTASSIUM SERPL-SCNC: 4.2 MMOL/L (ref 3.5–5.2)
PROT SERPL-MCNC: 6.5 G/DL (ref 6–8.5)
RBC # BLD AUTO: 3.9 10*6/MM3 (ref 3.77–5.28)
SODIUM SERPL-SCNC: 141 MMOL/L (ref 136–145)
STOMATOCYTES BLD QL SMEAR: ABNORMAL
TIBC SERPL-MCNC: 308 MCG/DL (ref 298–536)
TRANSFERRIN SERPL-MCNC: 207 MG/DL (ref 200–360)
VARIANT LYMPHS NFR BLD MANUAL: 79.8 % (ref 19.6–45.3)
WBC MORPH BLD: NORMAL
WBC NRBC COR # BLD: 15.25 10*3/MM3 (ref 3.4–10.8)

## 2023-10-11 PROCEDURE — 80053 COMPREHEN METABOLIC PANEL: CPT

## 2023-10-11 PROCEDURE — 36415 COLL VENOUS BLD VENIPUNCTURE: CPT

## 2023-10-11 PROCEDURE — 84466 ASSAY OF TRANSFERRIN: CPT

## 2023-10-11 PROCEDURE — 83615 LACTATE (LD) (LDH) ENZYME: CPT

## 2023-10-11 PROCEDURE — 82728 ASSAY OF FERRITIN: CPT

## 2023-10-11 PROCEDURE — 85025 COMPLETE CBC W/AUTO DIFF WBC: CPT

## 2023-10-11 PROCEDURE — 85007 BL SMEAR W/DIFF WBC COUNT: CPT

## 2023-10-11 PROCEDURE — 83540 ASSAY OF IRON: CPT

## 2023-10-11 PROCEDURE — 82784 ASSAY IGA/IGD/IGG/IGM EACH: CPT

## 2023-10-11 PROCEDURE — 82232 ASSAY OF BETA-2 PROTEIN: CPT

## 2023-10-11 NOTE — TELEPHONE ENCOUNTER
----- Message from Jay Cedeno MD sent at 10/11/2023 12:34 PM CDT -----  Add to labs: IgG  ----- Message -----  From: Lab, Background User  Sent: 10/11/2023   8:58 AM CDT  To: Jay Cedeno MD

## 2023-10-12 NOTE — PROGRESS NOTES
[facility] GROUP HEMATOLOGY AND ONCOLOGY  [address]  [place] [address] [phone]    Patient Name: Liset Wright  Encounter Date: 10/18/2023  YOB: 1950  Patient Number: [id number]       REASON FOR VISIT:  Liset Wright is a 73-year-old female who returns in follow-up of stage 0 chronic lymphocytic leukemia (B-CLL). She is seen 56.5 months since her initial visit on 1/28/19. She remains in observation. She is here alone.    I have reviewed the HPI and verified with the patient the accuracy of it. No changes to interval history since the information was documented. Jay Cedeno MD 10/18/23        DIAGNOSTIC ABNORMALITIES:   On 11/28/2018, labs showed a WBC of 13.9 with 78% lymphocytes (ALC 10.9), ANC 2.4, and was otherwise normal. Hemoglobin 12, hematocrit 36.7, MCV 87, platelets 261,000. CMP was normal in its entirety to include a BUN of 11, creatinine 0.78, GFR 79, calcium 9.2, total protein 6.8, and normal liver enzymes.  On 01/11/2019, CT of the abdomen and pelvis with IV contrast at [facility] showed no acute pathology in the abdomen or pelvis (no masses or any abnormalities to account for her left upper quadrant pain with no adenopathy and normal spleen).  On 01/16/2019, Ainsley-Barr virus titers showed an IgG level of 239 (0 - 17.9), Ainsley-Barr virus nuclear antigen IgG 118 (0 - 17.9), but IgM of less than 36 and early antigen of less than 9 (indicating prior infection).  On 01/10/2019, repeat CBC showed a hemoglobin of 11.3, hematocrit 35.5, MCV 91.3, platelets 249,000, WBC 13.29.   On 01/14/2019, blood smear (manual) review showed atypical lymphocytosis, moderate smudge cells present. RBC morphology showed normocytic, normochromic anemia without significant morphologic abnormality. Platelets normal morphology.  On 01/17/2019, peripheral blood was sent for flow cytometry ([facility]). This  revealed chronic lymphocytic leukemia (54% of total cells). Monoclonal B cells have a CLL immunophenotype and are negative for both CD38 and ZAP-70 associated with standard risk disease. PCR for IGVH and p53 mutation and FISH for CLL may provide additional prognostic information. Virtually all of the B cells are monoclonal and express CD19 (dim), CD20 (dim), CD5, CD23, CD11c, and dim surface kappa light chain. They are negative for CD10, CD38, FMC-7, ZAP-70, and surface lambda light chain.   Labs, 01/28/2019: Hemoglobin 13.1, hematocrit 39.5, MCV 90.5, platelets 283,000, WBC 12.2 with 19.3 segs (ANC 2.4), 75.8 lymphocytes (ALC 9.2). CMP normal. GFR 85, calcium 9.9, total protein 7.1, and normal liver enzymes.  (265 - 665). Beta 2 microglobulin 1.3. Serum iron 101, TIBC 333, iron saturation 30%, B12 of 431, folate 15 (each within reference range). Ferritin 23.7 (depressed). HIV screen negative. IgG 742.  Bone marrow biopsy/aspirate, 02/01/2019: PERIPHERAL BLOOD: B cell chronic lymphocytic leukemia. BONE MARROW, UNSPECIFIED SITE, SMEARS, CLOT AND BIOPSY: Involved by B cell chronic lymphocytic leukemia (30%). CLL panel: Positive for a deletion of DLEUI, DILEU2 on chromosome 13 at 04 (58,0% of cells), consistent with CLL. Isolated del 1304.3 is associated with a favorable clinical course. Three of the twenty mitotic cells examined were characterized by loss of one copy of the X chromosome and a deletion in the long arm of chromosome 13.   Stools OB x 3, 02/25/2019. Negative x 3    PREVIOUS INTERVENTIONS:   Observation        Problem List Items Addressed This Visit          Other    CLL (chronic lymphocytic leukemia) - Primary         Oncology/Hematology History    No history exists.       PAST MEDICAL HISTORY:  ALLERGIES:  Allergies   Allergen Reactions    Levofloxacin Paresthesia    Phenergan [Promethazine Hcl] Other (See Comments)     Jerky movements    Promethazine Unknown - High Severity    Ramipril  Palpitations     CURRENT MEDICATIONS:  Outpatient Encounter Medications as of 10/18/2023   Medication Sig Dispense Refill    albuterol sulfate  (90 Base) MCG/ACT inhaler Inhale 2 puffs Every 4 (Four) Hours As Needed for Wheezing. 18 g 4    dicyclomine (BENTYL) 10 MG capsule Take 1 capsule by mouth 2 (Two) Times a Day.      esomeprazole (nexIUM) 40 MG capsule Take 1 capsule by mouth 2 (Two) Times a Day. (Patient taking differently: Take 1 capsule by mouth Daily.) 60 capsule 11    metoprolol tartrate (LOPRESSOR) 100 MG tablet Take 1 tablet by mouth 3 (Three) Times a Day.      omeprazole (priLOSEC) 40 MG capsule Take 1 capsule by mouth Daily.      valsartan (DIOVAN) 320 MG tablet Take 0.5 tablets by mouth Daily.       No facility-administered encounter medications on file as of 10/18/2023.     ADULT ILLNESSES:   Chronic lymphocytic leukemia ( ICD-10:C91.10 ;Chronic lymphocytic leukemia of B-cell type not having achieved remission   Abdominal pain ( ICD-10:R10.12 ;Left upper quadrant pain   Asthma ( Dr. Johnson; ICD-10:J45.909 ;Unspecified asthma, uncomplicated )   Cervical degenerative disk disease ( x-ray 11/2017; ICD-10:M50.20 ;Other cervical disc displacement, unspecified cervical region )   Erosive gastritis ( Dr. Kaur; ICD-10:K29.60 ;Other gastritis without bleeding )   Gastroesophageal reflux disease ( GERD, Dr. Kaur; ICD-10:K21.9 ;Gastro-esophageal reflux disease without esophagitis )   Gastroparesis ( ICD-10:K31.84 ;Gastroparesis   Hypertension ( ICD-10:I10 ;Essential (primary) hypertension   Irritable bowel syndrome ( ICD-10:K58.9 ;Irritable bowel syndrome without diarrhea   Migraines ( ICD-10:G43.909 ;Migraine, unspecified, not intractable, without status migrainosus   Normocytic anemia ( ICD-10:D64.9 ;Anemia, unspecified   Palpitations ( normal heart cath, 02/2003, bilateral ultrasound showed no significant stenosis, 06/2015, stress echo 05/2018 normal; ICD-10:R00.2 ;Palpitations )   Chemo  "esophageal ring ( erosive gastritis/gastroesophageal reflux disease, Dr. Kaur; ICD-10:K22.2 ;Esophageal obstruction )  Right hip fracture following a fall, 04/13/2021.  Non-surgical management      SURGERIES:   Cholecystectomy   Cardiac catheterization, 02/2003   Colonoscopy, 2017. \"No polyps.\" 5 years. Dr. Kaur   EGD (upper endoscopy), 2018, normal, Dr. Kaur   Hysterectomy, total      ADULT ILLNESSES:  Patient Active Problem List   Diagnosis Code    Palpitations R00.2    PVCs (premature ventricular contractions) I49.3    Essential hypertension I10    Epigastric pain R10.13    CLL (chronic lymphocytic leukemia) C91.10    Bronchitis J40    Restrictive lung disease J98.4    Encounter for screening for malignant neoplasm of colon Z12.11    Right ventricular dilation I51.7    Chest pain R07.9    Closed fracture of pelvis with routine healing S32.9XXD    Degeneration of lumbar or lumbosacral intervertebral disc M51.37    Non-smoker Z78.9    Body mass index (BMI) of 23.0 to 23.9 in adult Z68.23    Gastroesophageal reflux disease K21.9    Leukemia C95.90    Nausea R11.0    Alpha-1-antitrypsin deficiency carrier Z14.8    Low serum IgG for age R76.8     SURGERIES:  Past Surgical History:   Procedure Laterality Date    CARDIAC CATHETERIZATION      CHOLECYSTECTOMY      COLONOSCOPY  10/16/2015    normal    COLONOSCOPY N/A 10/19/2020    Procedure: COLONOSCOPY WITH ANESTHESIA;  Surgeon: Grant Kaur DO;  Location: [facility] ENDOSCOPY;  Service: Gastroenterology;  Laterality: N/A;  pre: screening  post: normal  Harry Hastings MD    ENDOSCOPY N/A 11/23/2016    normal    ENDOSCOPY N/A 12/11/2018    Procedure: ESOPHAGOGASTRODUODENOSCOPY WITH ANESTHESIA;  Surgeon: Grant Kaur DO;  Location: [facility] ENDOSCOPY;  Service: Gastroenterology    ENDOSCOPY N/A 11/25/2020    Procedure: ESOPHAGOGASTRODUODENOSCOPY WITH ANESTHESIA;  Surgeon: Grant Kaur DO;  Location: [facility] ENDOSCOPY;  Service: Gastroenterology;  " Laterality: N/A;  preop; chest pain  postop; normal   PCP Harry Hastings     ENDOSCOPY N/A 11/11/2022    Procedure: ESOPHAGOGASTRODUODENOSCOPY WITH ANESTHESIA;  Surgeon: Grant Kaur DO;  Location: [facility] ENDOSCOPY;  Service: Gastroenterology;  Laterality: N/A;  Pre: Nausea  Post:normal  Harry Hastings MD    HYSTERECTOMY       HEALTH MAINTENANCE ITEMS:  Health Maintenance Due   Topic Date Due    TDAP/TD VACCINES (1 - Tdap) Never done    ZOSTER VACCINE (1 of 2) Never done    HEPATITIS C SCREENING  Never done    LIPID PANEL  12/15/2022    DXA SCAN  01/14/2023    INFLUENZA VACCINE  08/01/2023    COVID-19 Vaccine (6 - 2023-24 season) 09/01/2023       <no information>  Last Completed Colonoscopy            COLORECTAL CANCER SCREENING (COLONOSCOPY - Every 5 Years) Next due on 10/19/2025      10/19/2020  Surgical Procedure: COLONOSCOPY    10/19/2020  COLONOSCOPY    02/25/2019  Occult Blood X 3, Stool - Stool, Per Rectum    10/16/2015  SCANNED - COLONOSCOPY    06/17/2011  SCANNED - COLONOSCOPY    Only the first 5 history entries have been loaded, but more history exists.                  Immunization History   Administered Date(s) Administered    COVID-19 (PFIZER) BIVALENT 12+YRS 02/08/2023    COVID-19 (PFIZER) Purple Cap Monovalent 01/15/2021, 02/05/2021, 08/26/2021    FLUAD TRI 65YR+ 10/06/2017, 10/09/2018    Fluzone (or Fluarix & Flulaval for VFC) >6mos 11/01/2019    Fluzone High Dose =>65 Years ([employer] ONLY) 11/11/2021    Fluzone Quad >6mos (Multi-dose) 10/01/2018    Hep B, Unspecified 01/10/2002, 02/15/2002, 05/16/2002    Influenza Quad Vaccine (Inpatient) 10/01/2018    Influenza, Unspecified 11/02/2020    Pneumococcal Conjugate 13-Valent (PCV13) 10/06/2017    Pneumococcal Polysaccharide (PPSV23) 10/01/2018     Last Completed Mammogram            MAMMOGRAM (Every 2 Years) Next due on 1/19/2025 01/19/2023  Outside Claim: HC MAMMOGRAM SCREENING BILAT DIGITAL W CAD    01/19/2023  Outside Claim: CHG SCREENING  "DIGITAL BREAST TOMOSYNTHESIS BI    01/17/2022  Outside Claim: HC MAMMOGRAM SCREENING BILAT DIGITAL W CAD    01/17/2022  Outside Claim: G SCREENING DIGITAL BREAST TOMOSYNTHESIS BI    01/14/2021  Outside Claim:  MAMMOGRAM SCREENING BILAT DIGITAL W CAD    Only the first 5 history entries have been loaded, but more history exists.                      FAMILY HISTORY:  Family History   Problem Relation Age of Onset    Cancer Mother     Cancer Father         lung    Cancer Paternal Grandmother         stomach    No Known Problems Brother     No Known Problems Maternal Aunt     No Known Problems Maternal Uncle     No Known Problems Paternal Aunt     No Known Problems Paternal Uncle     No Known Problems Maternal Grandmother     No Known Problems Maternal Grandfather     No Known Problems Paternal Grandfather     No Known Problems Other     Colon cancer Neg Hx     Esophageal cancer Neg Hx     Asthma Neg Hx     Diabetes Neg Hx     Emphysema Neg Hx     Heart failure Neg Hx     Hypertension Neg Hx     Colon polyps Neg Hx      SOCIAL HISTORY:  Social History     Socioeconomic History    Marital status:    Tobacco Use    Smoking status: Never     Passive exposure: Current    Smokeless tobacco: Never   Vaping Use    Vaping Use: Never used   Substance and Sexual Activity    Alcohol use: No    Drug use: No    Sexual activity: Defer       REVIEW OF SYSTEMS:  Constitutional:   The patient's appetite is good, \"no problems there.\"  Her energy is variable, \"still some days better than others.\" She manages her ADLs including chores, errands.  She still drives.  She has no weight changes (had gained 4 lb at her 2 prior visits) since her last visit.  She has no fevers, chills, or drenching night sweats. \"Not even when I have these lung infections.\" Her sleep habits seem appropriate.  Ear/Nose/Throat/Mouth:   She reports no ear pains, sinus symptoms, sore throat, nosebleeds, or sore tongue. She has migraine headaches. She denies " "any hoarseness.   Ocular:   She reports no eye pain, significant change in visual acuity, double vision, or blurry vision.  Respiratory:   Says she is prone to respiratory infections due to alpha 1 antitrypsin deficiency.  Again says, \"I'm on day 3/10 of antibiotics (Augmentin) for another bout of bronchitis.\"  Says that since her last visit here last July this is her second round of antibiotics.  Says that through it all her home O2 sats run 97-99% on RA.  Has been cough with yellow phlegm for the past week.  She has some exertional dyspnea from possible asthma but no chronic cough, significant shortness of breathing at rest or unexplained chest wall pain. Says prior PFTs suggest \"asthma\".  Is followed by [facility]  Cardiovascular:   She reports no exertional chest pain, chest pressure, or chest heaviness. She reports no claudication. She reports occasional palpitations but denies symptomatic orthostasis.  Gastrointestinal:   She reports no dysphagia, nausea, vomiting, postprandial abdominal pain, bloating, cramping, change in bowel habits, with dark (OTC iron) discoloration of the stool. She reports no rectal bleeding. She reports occasional but chronic constipation requiring stool softeners, lifelong. She has no diarrhea.  Genitourinary:   She reports no urinary burning, frequency, dribbling, or discoloration. She reports no difficulty controlling her bladder. She has no need to urinate frequently through the night.   Musculoskeletal:          She reports no unexplained arthralgias, myalgias, or nighttime leg cramping.  Says she sustained a right hip fracture following a fall, 04/13/2021.  Non-surgical management.  Has only intermittent residual pains, \"sometimes.\".  Extremities:   She reports no trouble with fluid retention or significant leg swelling.  Endocrine:   She reports no problems with excess thirst, excessive urination, vasomotor instability, or unexplained fatigue.  Heme/Lymphatic:   She reports " "easy bruising but no unexplained bleeding, petechial rashes, or swollen glands.  Skin:   She reports no itching, rashes, or lesions which won't heal.  Neuro:   She reports no loss of consciousness, seizures, fainting spells, or dizziness. She reports no weakness of face, arms, or legs. She has no difficulty with speech. She has no tremors. She admits to occasional paresthesias of the hands.   Psych:   She seems generally satisfied with life. She denies depression. She reports no mood swings.       VITAL SIGNS: /86   Pulse 57   Temp 97.3 °F (36.3 °C) (Temporal)   Resp 18   Ht 165.1 cm (65\")   Wt 62.6 kg (137 lb 14.4 oz)   LMP  (LMP Unknown)   SpO2 97%   BMI 22.95 kg/m² Body surface area is 1.69 meters squared.  Pain Score    10/18/23 0858   PainSc: 0-No pain              PHYSICAL EXAMINATION:   General:   She is a pleasant, cooperative, slender but otherwise well-developed, well-nourished, and modestly-kept elderly female who is comfortable at rest. She arrived in the exam room ambulatory. She appears to be her stated age. Her skin color is normal. ECOG 0  Head/Neck:   The patient is anicteric and atraumatic. The trachea is midline. The neck is supple without evidence of jugular venous distention or cervical adenopathy. Prominent, non-tender right sternoclavicular joint.  Eyes:   The pupils are equal, round, and reactive to light. The extraocular movements are full. There is no scleral jaundice or erythema.   Chest:   The respiratory efforts are normal and unhindered. There are soft scattered rales but no wheezes, rhonchi or asymmetry of breath sounds.  Cardiovascular:   The patient has a regular cardiac rate and rhythm without murmurs, rubs, or gallops. The peripheral pulses are equal and full.  Abdomen:   The belly is soft and flat. There is no rebound or guarding. There is no organomegaly, mass-effect, or tenderness. Bowel sounds are active and of normal character.  Extremities:   There is no " evidence of cyanosis, clubbing, or edema.  Rheumatologic:   There is no overt evidence of rheumatoid deformities of the hands. There is no sausaging of the fingers. There is no sign of active synovitis. The gait is normal.  Cutaneous:   There are no overt rashes, disseminated lesions, purpura, or petechiae.   Lymphatics:   There is no evidence of adenopathy in the cervical, supraclavicular, axillary, inguinal, or femoral areas. There is no splenomegaly.  Neurologic:   The patient is alert, oriented, cooperative, and pleasant. She is appropriately conversant. She ambulated into the exam room without assistance and transferred from chair to exam table unaided. There is no overt dysfunction of the motor, sensory, cerebellar systems.  Psych:   Mood and affect are appropriate for circumstance. Eye contact is appropriate. Normal judgement and decision making.         LABS    Lab Results - Last 18 Months   Lab Units 10/11/23  0849 07/21/23  0920 01/16/23  0810 12/27/22  1009 08/02/22  0743   HEMOGLOBIN g/dL 11.4* 11.6* 11.1* 12.3 11.2*   HEMATOCRIT % 36.3 36.9 35.7 39.1 35.7   MCV fL 93.1 94.1 97.3* 98.2 95.5   WBC 10*3/mm3 15.25* 12.26* 17.80* 19.1* 15.39*   RDW % 13.0 13.1 14.4 14.8* 13.4   MPV fL 9.3 9.1 9.2 9.2* 9.3   PLATELETS 10*3/mm3 259 315 241 275 277   NEUTROS ABS 10*3/mm3 2.93 2.79 6.00 3.2 1.88   LYMPHS ABS 10*3/mm3  --  8.89*  --  15.1*  --    MONOS ABS 10*3/mm3  --  0.42  --  0.40  --    EOS ABS 10*3/mm3  --  0.08 0.18 0.19  --    BASOS ABS 10*3/mm3  --  0.05  --  0.20  --    IMMATURE GRANS (ABS) K/uL  --   --   --  0.1  --    NEUTROPHIL % % 19.2*  --  33.7*  --  12.2*   MONOCYTES % % 1.0*  --  1.0*  --  0.8*   ATYP LYMPH % %  --   --  10.2*  --  0.8   ANISOCYTOSIS   --   --   --   --  Mod/2+       Lab Results - Last 18 Months   Lab Units 10/11/23  0849 07/21/23  0920   GLUCOSE mg/dL 113* 108*   SODIUM mmol/L 141 138   POTASSIUM mmol/L 4.2 4.3   CO2 mmol/L 28.0 29.0   CHLORIDE mmol/L 104 101   ANION GAP  mmol/L 9.0 8.0   CREATININE mg/dL 0.68 0.69   BUN mg/dL 16 15   BUN / CREAT RATIO  23.5 21.7   CALCIUM mg/dL 9.3 9.2   ALK PHOS U/L 105 105   TOTAL PROTEIN g/dL 6.5 6.2   ALT (SGPT) U/L 11 9   AST (SGOT) U/L 16 14   BILIRUBIN mg/dL 0.6 0.4   ALBUMIN g/dL 4.3 4.0   GLOBULIN gm/dL 2.2 2.2       Lab Results - Last 18 Months   Lab Units 10/11/23  0849 23  0920 23  0810 22  0743   LDH U/L 224* 197 213 212       Lab Results - Last 18 Months   Lab Units 10/11/23  0849 23  0810 22  0743   IRON mcg/dL 78 78 90   TIBC mcg/dL 308 332 326   IRON SATURATION (TSAT) % 25 23 28   FERRITIN ng/mL 136.80 111.90 184.30*       ASSESSMENT:   1. B cell chronic lymphocytic leukemia (B-CLL):   Stage: 0   Tumor [place]: Lymphocytosis.   Complications of Tumor: None.   Tumor Status: Untreated.   IVGH: Pending.   CD38: Negative.   ZAP-70: Negative.   Isolated del 13q14.3 (favorable)   LDH: 224, 10/11/23 (prior: 197 - 599)   B2M: 1.6, 10/11/23 (prior: 1.3 - 2.1)   Ig, 10/11/23 (prior: 517 - 742)  10/11/23- WBC 15.25; ALC 12.17  (range: WBC 13.0-23.99; ALC 8.64-15.1)  2. Normocytic anemia.    --Repleted ferritin -133, 2022 (from 159, 145.4, 131.5; 53.2; 35; 23).   --Hgb 11.4; MCV 93.1, 10/11/23 (prior: Hgb 11.3 - 13.3; MCV 87 - 98.2)   3. Irritable bowel syndrome. On Bentyl  4. Gastroparesis. On omeprazole  5. Migraines. On Excedrin migraines  6. Cervical degenerative disk disease.  No meds        7.  Alpha 1 antitrypsin deficiency. Dr. Johnson.  Now followed by Dr. Vasquez        8.  Palpitations with echo, 2020 showing grade II diastolic dysfunction but EF > 70%.  Dr. Hawk follows        9.  Prominent right sterno-clavicular joint. Asymptomatic         --2020-right sternoclavicular joint x-ray.  Mild arthritic changes of the inferior proximal right clavicle adjacent to the sternoclavicular joint with appropriate alignment.         10. Received COVID # 4 vaccination, 2022       11.  Recurrent pulmonary infections.  Now sees pulmonary in [facility]- Dr. Russell  --1/24/2023- Visit with Dr. [id number] on recurrent respiratory infections and immunoglobulin G deficiency. She reports that recently she had again symptoms of upper respiratory tract infection. Her CT of the chest showed areas of groundglass opacities that are scattered and calcified granulomas.  The patient's recurrent upper respiratory infections symptoms could be related to common variable immune deficiency secondary to low IgG versus immune deficiency secondary to CLL or both. She might benefit from a trial of IVIG. We will proceed with IVIG.  --7/25/23- CT chest- Multiple nodular densities within the lungs bilaterally which have developed in the interval.  PET imaging recommended. Unexpected findings.  Multiple new pulmonary masses.   --9/25/23 - CT chest.  Stable nonaggressive nodules.  Improving bronchiolitis.  Remote granulomatous disease.  No new abnormality.        RECOMMENDATIONS:   1.   Apprised of labs from 10/11/23, Normal CMP, stable leukocytosis/lymphocytosis, normal ANC, stable anemia, normal platelets, repleted iron levels, normal (1.6) B2M,  (H), adequate IgG 601 (prior: 612).    2.  Review follow-up Dr. Vasquez, 10/3/23.  A/P:  Lung nodules- CT CHEST WO CONTRAST; Future.  Alpha-1-antitrypsin deficiency carrier.  CVID (common variable immunodeficiency) (HCC).  Pneumonia of right middle lobe due to infectious organism.  CLL (chronic lymphocytic leukemia) (HCC).  OHARA (dyspnea on exertion).  We reviewed the CT images with the patient. There is definite clearing of the prior infiltrate. She does have low IgG level. She also has CLL which predisposes her to recurrent infections. We discussed starting IVIG. I will discuss this with Dr. Dumont her hematologist at [facility].  We will repeat CT of the chest in 6 months to assure stability of her nodules.     3.   Apprised of CT chest, 9/25/23  "(above).  Stable nonaggressive nodules.  Improving bronchiolitis.  Remote granulomatous disease.  No new abnormality.     4.  Previously discussed the labs from 02/21 and 01/28/2019 stable low grade leukocytosis and lymphocytosis (greater than 5000) normal Hgb and normal platelets, normal CMP, normal LDH, normal B2M, repleted serum iron, repleted (> 20%) fe sat, low (< 100) ferritin, repleted B12/folate, negative HIV screen, IgG > 500.   5.  Previously discussed the bone marrow biopsy, 02/01 (above). Confirms B-CLL with del 13q14 (favorable)   6.  B- CLL again previously discussed. I had explained that standard therapy has not appreciably altered the nature history of CLL and survival curves demonstrate that CLL is an incurable disease. Randomized studies have failed to show a survival advantage of immediate treatment over delayed treatment for asymptomatic patients with early stage disease. As a result, treatment is generally reserved until the patient has active disease defined as the presence of disease-related symptoms: Weight loss greater than 10%; extreme fatigue; fever greater than 100.5 for 2 weeks with night sweats without evidence of infection (\"B\" symptoms); worsening cytopenias (HGB less than 11; platelets less than 100,000); unexplained recurrent infections; worsening adenopathy, or splenomegaly. She is aware to call should she develop any of these symptoms.    7.  Refer to [place] [facility] Re:  Management opinions in this patient with recurrent lung infections in the context of alpha 1 antitrypsin deficiency, stable CLL with IgG>500.  8.  Return to the [place] office in 8 weeks (after being seen in [place]) with pre-office serum iron, Fe sat, ferritin, CBC and differential, IgG, LDH, B2M.    QUALITY MEASURES:   MEDICAL DECISION MAKING: High Complexity   AMOUNT OF DATA:  Moderate to Extensive    I spent ~64 minutes caring for Liset on this date of service. This time includes time spent by me in " the following activities: preparing for the visit, reviewing tests, performing a medically appropriate examination and/or evaluation, counseling and educating the patient/family/caregiver, ordering medications, tests, or procedures and documenting information in the medical record.       cc: Harry Hastings MD

## 2023-10-18 ENCOUNTER — OFFICE VISIT (OUTPATIENT)
Dept: ONCOLOGY | Facility: CLINIC | Age: 73
End: 2023-10-18
Payer: MEDICARE

## 2023-10-18 VITALS
HEIGHT: 65 IN | BODY MASS INDEX: 22.98 KG/M2 | TEMPERATURE: 97.3 F | DIASTOLIC BLOOD PRESSURE: 86 MMHG | HEART RATE: 57 BPM | OXYGEN SATURATION: 97 % | RESPIRATION RATE: 18 BRPM | SYSTOLIC BLOOD PRESSURE: 142 MMHG | WEIGHT: 137.9 LBS

## 2023-10-18 DIAGNOSIS — C91.10 CLL (CHRONIC LYMPHOCYTIC LEUKEMIA): Primary | ICD-10-CM

## 2023-11-13 ENCOUNTER — OFFICE VISIT (OUTPATIENT)
Dept: CARDIOLOGY | Facility: CLINIC | Age: 73
End: 2023-11-13
Payer: MEDICARE

## 2023-11-13 VITALS
DIASTOLIC BLOOD PRESSURE: 78 MMHG | OXYGEN SATURATION: 99 % | HEIGHT: 65 IN | WEIGHT: 140 LBS | SYSTOLIC BLOOD PRESSURE: 138 MMHG | BODY MASS INDEX: 23.32 KG/M2 | HEART RATE: 53 BPM

## 2023-11-13 DIAGNOSIS — R00.2 PALPITATIONS: Primary | ICD-10-CM

## 2023-11-13 DIAGNOSIS — C91.10 CLL (CHRONIC LYMPHOCYTIC LEUKEMIA): ICD-10-CM

## 2023-11-13 DIAGNOSIS — J98.4 RESTRICTIVE LUNG DISEASE: ICD-10-CM

## 2023-11-13 DIAGNOSIS — I49.3 PVCS (PREMATURE VENTRICULAR CONTRACTIONS): ICD-10-CM

## 2023-11-13 DIAGNOSIS — I51.7 RIGHT VENTRICULAR DILATION: ICD-10-CM

## 2023-11-13 PROCEDURE — 3075F SYST BP GE 130 - 139MM HG: CPT | Performed by: NURSE PRACTITIONER

## 2023-11-13 PROCEDURE — 1160F RVW MEDS BY RX/DR IN RCRD: CPT | Performed by: NURSE PRACTITIONER

## 2023-11-13 PROCEDURE — 93000 ELECTROCARDIOGRAM COMPLETE: CPT | Performed by: NURSE PRACTITIONER

## 2023-11-13 PROCEDURE — 3078F DIAST BP <80 MM HG: CPT | Performed by: NURSE PRACTITIONER

## 2023-11-13 PROCEDURE — 1159F MED LIST DOCD IN RCRD: CPT | Performed by: NURSE PRACTITIONER

## 2023-11-13 PROCEDURE — 99214 OFFICE O/P EST MOD 30 MIN: CPT | Performed by: NURSE PRACTITIONER

## 2023-11-13 RX ORDER — VALSARTAN 160 MG/1
160 TABLET ORAL DAILY
COMMUNITY
Start: 2023-10-27

## 2023-11-13 NOTE — PROGRESS NOTES
"    Subjective:     Encounter Date:11/13/2023      Patient ID: Liset Wright is a 73 y.o. female     Chief Complaint: \"no complaints\"  Palpitations   This is a chronic problem. The current episode started more than 1 year ago. The problem occurs rarely. The problem has been rapidly improving. Pertinent negatives include no chest pain, coughing, dizziness, irregular heartbeat, malaise/fatigue, near-syncope, shortness of breath, syncope or weakness.   Shortness of Breath  Associated symptoms include leg swelling. Pertinent negatives include no chest pain, orthopnea, PND, rash, sputum production, syncope or wheezing.   Leg Swelling  Pertinent negatives include no chest pain, coughing, rash or weakness.   Hypertension  This is a chronic problem. The current episode started more than 1 year ago. The problem has been rapidly improving since onset. The problem is controlled. Associated symptoms include palpitations. Pertinent negatives include no chest pain, malaise/fatigue, orthopnea, PND or shortness of breath.   Hyperlipidemia  This is a chronic problem. The current episode started more than 1 year ago. The problem is controlled. Recent lipid tests were reviewed and are normal. Pertinent negatives include no chest pain or shortness of breath.     Patient presents today for a routine follow up. Patient is followed for symptomatic PVCs that have been well controlled with beta blocker therapy. She had a holter ordered by her PCP in 11/2020 for hypotension that revealed 2 short episodes of PSVT. She also had a 2D echo completed that revealed a normal LVEF, grade 2 diastolic dysfunction, trace-mild MR, mild TR, and mild-moderately dilated RV with normal systolic function.     Today she reports she has been well. She notes her palpitations only occur when she is being treated with steroids when she is sick and otherwise does not have palpitations. She remains on lopressor 100mg TID which was increased a few years ago by her " PCP due to an increase in palpitations. She notes edema in her ankles and knees that progress through the day and resolves overnight. She notes OHARA with overexertion. She denies chest pain, lightheadedness, dizziness, syncope and near syncope.     The following portions of the patient's history were reviewed and updated as appropriate: allergies, current medications, past family history, past medical history, past social history, past surgical history and problem list.    Allergies   Allergen Reactions    Levofloxacin Paresthesia    Phenergan [Promethazine Hcl] Other (See Comments)     Jerky movements    Promethazine Unknown - High Severity    Ramipril Palpitations       Current Outpatient Medications:     albuterol sulfate  (90 Base) MCG/ACT inhaler, Inhale 2 puffs Every 4 (Four) Hours As Needed for Wheezing., Disp: 18 g, Rfl: 4    dicyclomine (BENTYL) 10 MG capsule, Take 1 capsule by mouth 2 (Two) Times a Day., Disp: , Rfl:     esomeprazole (nexIUM) 40 MG capsule, Take 1 capsule by mouth 2 (Two) Times a Day. (Patient taking differently: Take 1 capsule by mouth Daily.), Disp: 60 capsule, Rfl: 11    metoprolol tartrate (LOPRESSOR) 100 MG tablet, Take 1 tablet by mouth 3 (Three) Times a Day., Disp: , Rfl:     omeprazole (priLOSEC) 40 MG capsule, Take 1 capsule by mouth Daily., Disp: , Rfl:     valsartan (DIOVAN) 160 MG tablet, Take 1 tablet by mouth Daily. for blood pressure, Disp: , Rfl:   Past Medical History:   Diagnosis Date    Acid reflux     Alpha-1-antitrypsin deficiency     Arrhythmia     Arthritis     Asthma     Cancer     leukemia    Hypertension     Low back pain     Palpitations        Social History     Socioeconomic History    Marital status:    Tobacco Use    Smoking status: Never     Passive exposure: Current    Smokeless tobacco: Never   Vaping Use    Vaping Use: Never used   Substance and Sexual Activity    Alcohol use: No    Drug use: No    Sexual activity: Defer       Review of  Systems   Constitutional: Negative for malaise/fatigue, weight gain and weight loss.   Cardiovascular:  Positive for leg swelling and palpitations. Negative for chest pain, dyspnea on exertion, irregular heartbeat, near-syncope, orthopnea, paroxysmal nocturnal dyspnea and syncope.   Respiratory:  Negative for cough, shortness of breath, sleep disturbances due to breathing, sputum production and wheezing.    Skin:  Negative for dry skin, flushing, itching and rash.   Gastrointestinal:  Negative for hematemesis and hematochezia.   Neurological:  Negative for dizziness, light-headedness, loss of balance and weakness.   All other systems reviewed and are negative.         Objective:     Vitals reviewed.   Constitutional:       General: Not in acute distress.     Appearance: Well-developed. Not diaphoretic.   Eyes:      General: No scleral icterus.     Conjunctiva/sclera: Conjunctivae normal.      Pupils: Pupils are equal, round, and reactive to light.   HENT:      Head: Normocephalic.    Mouth/Throat:      Pharynx: No oropharyngeal exudate.   Pulmonary:      Effort: Pulmonary effort is normal. No respiratory distress.      Breath sounds: Normal breath sounds. No wheezing. No rales.   Chest:      Chest wall: Not tender to palpatation.   Cardiovascular:      Bradycardia present. Regular rhythm.   Pulses:     Intact distal pulses.   Edema:     Peripheral edema absent.   Abdominal:      General: Bowel sounds are normal. There is no distension.      Palpations: Abdomen is soft.      Tenderness: There is no abdominal tenderness.   Musculoskeletal: Normal range of motion.      Cervical back: Normal range of motion and neck supple. Skin:     General: Skin is warm and dry.      Coloration: Skin is not pale.      Findings: No erythema or rash.   Neurological:      Mental Status: Alert and oriented to person, place, and time.      Deep Tendon Reflexes: Reflexes are normal and symmetric.   Psychiatric:         Behavior: Behavior  "normal.           ECG 12 Lead    Date/Time: 11/13/2023 10:17 AM  Performed by: Caitie Sanchez APRN    Authorized by: Caitie Sanchez APRN  Comparison: compared with previous ECG from 11/10/2022  Similar to previous ECG  Rhythm: sinus bradycardia  Rate: bradycardic  BPM: 53  Conduction: conduction normal  ST Segments: ST segments normal  QRS axis: normal  Other findings: T wave abnormality and left ventricular hypertrophy    Clinical impression: abnormal EKG        /78 (BP Location: Left arm, Patient Position: Sitting, Cuff Size: Adult)   Pulse 53   Ht 163.8 cm (64.5\")   Wt 63.5 kg (140 lb)   LMP  (LMP Unknown)   SpO2 99%   BMI 23.66 kg/m²     Lab Review:   I have reviewed previous office notes, recent labs and recent cardiac testing.     Lab Results   Component Value Date    CHLPL 183 08/23/2018    TRIG 55 08/23/2018    HDL 83 08/23/2018    LDL 89 08/23/2018     Holter 10/2020:   Study Impressions    An abnormal monitor study. 2 short asymptomatic runs of PSVT     Results for orders placed in visit on 09/29/20    Adult Transthoracic Echo Complete W/ Cont if Necessary Per Protocol    Interpretation Summary  · Left ventricular wall thickness is consistent with concentric hypertrophy.  · Estimated left ventricular EF = 65% Left ventricular systolic function is normal.  · Left ventricular diastolic function is consistent with (grade II w/high LAP) pseudonormalization.  · The left atrial cavity is mildly dilated.  · Trace to mild mitral valve regurgitation is present.  · Mild tricuspid valve regurgitation is present.  · Estimated right ventricular systolic pressure from tricuspid regurgitation is mildly elevated (35-45 mmHg).  · The right ventricular cavity is mild to moderately dilated.        Assessment:          Diagnosis Plan   1. Palpitations        2. PVCs (premature ventricular contractions)        3. CLL (chronic lymphocytic leukemia)        4. Restrictive lung disease        5. Right ventricular " dilation               Plan:       1. Palpitations- remains stable with lopressor and exacerbated with steroids which is to be expected. Holter in 2020 revealed 2 short asymptomatic PSVT episodes.   2. PVCs- stable. Controlled with lopressor.   3. CLL-. Followed by oncology  4. HTN- controlled. Followed by PCP.   5. Restrictive lung disease- Followed by pulmonary.  6. RV dilation- mild-moderate. No evidence of CHF. No further workup indicated at this time.       Follow up in 1 year or sooner if symptoms worsen.     I spent 30 minutes caring for Liset on this date of service. This time includes time spent by me in the following activities:preparing for the visit, reviewing tests, obtaining and/or reviewing a separately obtained history, performing a medically appropriate examination and/or evaluation , documenting information in the medical record, independently interpreting results and communicating that information with the patient/family/caregiver and care coordination    I spent 2 minutes on the separately reported service of EKG interpretation. This time is not included in the time used to support the E/M service also reported today.

## 2023-11-29 ENCOUNTER — TRANSCRIBE ORDERS (OUTPATIENT)
Dept: ADMINISTRATIVE | Facility: HOSPITAL | Age: 73
End: 2023-11-29
Payer: MEDICARE

## 2023-11-29 ENCOUNTER — LAB (OUTPATIENT)
Dept: LAB | Facility: HOSPITAL | Age: 73
End: 2023-11-29
Payer: MEDICARE

## 2023-11-29 DIAGNOSIS — J98.8 OTHER SPECIFIED RESPIRATORY DISORDERS: Primary | ICD-10-CM

## 2023-11-29 DIAGNOSIS — J98.8 OTHER SPECIFIED RESPIRATORY DISORDERS: ICD-10-CM

## 2023-11-29 LAB
IGA1 MFR SER: 123 MG/DL (ref 70–400)
IGG1 SER-MCNC: 638 MG/DL (ref 700–1600)
IGM SERPL-MCNC: 146 MG/DL (ref 40–230)

## 2023-11-29 PROCEDURE — 86356 MONONUCLEAR CELL ANTIGEN: CPT

## 2023-11-29 PROCEDURE — 86360 T CELL ABSOLUTE COUNT/RATIO: CPT

## 2023-11-29 PROCEDURE — 82784 ASSAY IGA/IGD/IGG/IGM EACH: CPT

## 2023-11-29 PROCEDURE — 86317 IMMUNOASSAY INFECTIOUS AGENT: CPT

## 2023-11-29 PROCEDURE — 86357 NK CELLS TOTAL COUNT: CPT

## 2023-11-29 PROCEDURE — 86359 T CELLS TOTAL COUNT: CPT

## 2023-11-29 PROCEDURE — 36415 COLL VENOUS BLD VENIPUNCTURE: CPT

## 2023-11-29 PROCEDURE — 86355 B CELLS TOTAL COUNT: CPT

## 2023-12-01 LAB
C DIPHTHERIAE AB SER IA-ACNC: 0.25 IU/ML
C TETANI TOXOID IGG SERPL IA-ACNC: 0.38 IU/ML

## 2023-12-02 LAB
CD19 CELLS NFR SPEC: 83 % (ref 5–21)
CD3 CELLS # BLD: 1854 CELLS/UL (ref 660–2200)
CD3 CELLS NFR SPEC: 16 % (ref 62–89)
CD3+CD4+ CELLS # BLD: 1114 CELLS/UL (ref 490–1600)
CD3+CD4+ CELLS NFR BLD: 9 % (ref 35–68)
CD3+CD4+ CELLS/CD3+CD8+ CLL BLD: 1.5 RATIO (ref 0.8–6.17)
CD3+CD8+ CELLS # BLD: 762 CELLS/UL (ref 150–1050)
CD3+CD8+ CELLS NFR SPEC: 6 % (ref 10–46)
CD3-CD16+CD56+ CELLS # SPEC: 79 CELLS/UL (ref 74–620)
CD3-CD16+CD56+ CELLS NFR SPEC: 1 % (ref 5–28)
CD4+CD45+ CELLS NFR BLD: 73 % (ref 38–81)
CD4+CD45RA+ CELLS # BLD: 275 CELLS/UL (ref 260–1000)
CD4+CD45RO+ CELLS # BLD: 741 CELLS/UL (ref 490–1200)
CD45RA CELLS NFR BLD: 27 % (ref 19–62)
CELLS.CD3-CD19+ [#/VOLUME] IN BLOOD: 9747 CELLS/UL (ref 74–510)
CONV COMMENT: ABNORMAL

## 2023-12-06 LAB
S PN DA SERO 19F IGG SER IA-MCNC: 4.9 UG/ML
S PNEUM DA 1 IGG SER IA-MCNC: 2 UG/ML
S PNEUM DA 10A IGG SER IA-MCNC: 2.3 UG/ML
S PNEUM DA 11A IGG SER IA-MCNC: 0.4 UG/ML
S PNEUM DA 12F IGG SER IA-MCNC: 0.5 UG/ML
S PNEUM DA 14 IGG SER IA-MCNC: 2.8 UG/ML
S PNEUM DA 15B IGG SER IA-MCNC: 11.9 UG/ML
S PNEUM DA 17F IGG SER IA-MCNC: 1 UG/ML
S PNEUM DA 18C IGG SER IA-MCNC: 4 UG/ML
S PNEUM DA 19A IGG SER IA-MCNC: 1.3 UG/ML
S PNEUM DA 2 IGG SER IA-MCNC: 11.7 UG/ML
S PNEUM DA 20A IGG SER IA-MCNC: 12.6 UG/ML
S PNEUM DA 22F IGG SER IA-MCNC: 1.8 UG/ML
S PNEUM DA 23F IGG SER IA-MCNC: 0.8 UG/ML
S PNEUM DA 3 IGG SER IA-MCNC: 0.8 UG/ML
S PNEUM DA 33F IGG SER IA-MCNC: 3.3 UG/ML
S PNEUM DA 4 IGG SER IA-MCNC: 3.2 UG/ML
S PNEUM DA 5 IGG SER IA-MCNC: 0.8 UG/ML
S PNEUM DA 6B IGG SER IA-MCNC: 3.5 UG/ML
S PNEUM DA 7F IGG SER IA-MCNC: 1.6 UG/ML
S PNEUM DA 8 IGG SER IA-MCNC: 1.1 UG/ML
S PNEUM DA 9N IGG SER IA-MCNC: 6 UG/ML
S PNEUM DA 9V IGG SER IA-MCNC: 1.2 UG/ML

## 2023-12-12 ENCOUNTER — LAB (OUTPATIENT)
Dept: LAB | Facility: HOSPITAL | Age: 73
End: 2023-12-12
Payer: MEDICARE

## 2023-12-12 DIAGNOSIS — C91.10 CLL (CHRONIC LYMPHOCYTIC LEUKEMIA): ICD-10-CM

## 2023-12-12 LAB
ALBUMIN SERPL-MCNC: 4.1 G/DL (ref 3.5–5.2)
ALBUMIN/GLOB SERPL: 2 G/DL
ALP SERPL-CCNC: 88 U/L (ref 39–117)
ALT SERPL W P-5'-P-CCNC: 11 U/L (ref 1–33)
ANION GAP SERPL CALCULATED.3IONS-SCNC: 8 MMOL/L (ref 5–15)
ANISOCYTOSIS BLD QL: ABNORMAL
AST SERPL-CCNC: 17 U/L (ref 1–32)
B2 MICROGLOB SERPL-MCNC: 2 MG/L (ref 0.8–2.2)
BASOPHILS # BLD MANUAL: 0.15 10*3/MM3 (ref 0–0.2)
BASOPHILS NFR BLD MANUAL: 1 % (ref 0–1.5)
BILIRUB SERPL-MCNC: 0.7 MG/DL (ref 0–1.2)
BUN SERPL-MCNC: 10 MG/DL (ref 8–23)
BUN/CREAT SERPL: 12.5 (ref 7–25)
CALCIUM SPEC-SCNC: 9 MG/DL (ref 8.6–10.5)
CHLORIDE SERPL-SCNC: 100 MMOL/L (ref 98–107)
CO2 SERPL-SCNC: 31 MMOL/L (ref 22–29)
CREAT SERPL-MCNC: 0.8 MG/DL (ref 0.57–1)
DEPRECATED RDW RBC AUTO: 45.1 FL (ref 37–54)
EGFRCR SERPLBLD CKD-EPI 2021: 78.9 ML/MIN/1.73
EOSINOPHIL # BLD MANUAL: 0.29 10*3/MM3 (ref 0–0.4)
EOSINOPHIL NFR BLD MANUAL: 2 % (ref 0.3–6.2)
ERYTHROCYTE [DISTWIDTH] IN BLOOD BY AUTOMATED COUNT: 13.2 % (ref 12.3–15.4)
FERRITIN SERPL-MCNC: 138.4 NG/ML (ref 13–150)
GLOBULIN UR ELPH-MCNC: 2.1 GM/DL
GLUCOSE SERPL-MCNC: 132 MG/DL (ref 65–99)
HCT VFR BLD AUTO: 36.5 % (ref 34–46.6)
HGB BLD-MCNC: 11.6 G/DL (ref 12–15.9)
IGG1 SER-MCNC: 596 MG/DL (ref 700–1600)
IRON 24H UR-MRATE: 114 MCG/DL (ref 37–145)
IRON SATN MFR SERPL: 36 % (ref 20–50)
LDH SERPL-CCNC: 221 U/L (ref 135–214)
LYMPHOCYTES # BLD MANUAL: 9.95 10*3/MM3 (ref 0.7–3.1)
LYMPHOCYTES NFR BLD MANUAL: 3 % (ref 5–12)
MCH RBC QN AUTO: 29.4 PG (ref 26.6–33)
MCHC RBC AUTO-ENTMCNC: 31.8 G/DL (ref 31.5–35.7)
MCV RBC AUTO: 92.6 FL (ref 79–97)
MONOCYTES # BLD: 0.44 10*3/MM3 (ref 0.1–0.9)
NEUTROPHILS # BLD AUTO: 3.8 10*3/MM3 (ref 1.7–7)
NEUTROPHILS NFR BLD MANUAL: 26 % (ref 42.7–76)
PLAT MORPH BLD: NORMAL
PLATELET # BLD AUTO: 221 10*3/MM3 (ref 140–450)
PMV BLD AUTO: 9.6 FL (ref 6–12)
POTASSIUM SERPL-SCNC: 4.4 MMOL/L (ref 3.5–5.2)
PROT SERPL-MCNC: 6.2 G/DL (ref 6–8.5)
RBC # BLD AUTO: 3.94 10*6/MM3 (ref 3.77–5.28)
SODIUM SERPL-SCNC: 139 MMOL/L (ref 136–145)
TIBC SERPL-MCNC: 320 MCG/DL (ref 298–536)
TRANSFERRIN SERPL-MCNC: 215 MG/DL (ref 200–360)
VARIANT LYMPHS NFR BLD MANUAL: 60 % (ref 19.6–45.3)
VARIANT LYMPHS NFR BLD MANUAL: 8 % (ref 0–5)
WBC MORPH BLD: NORMAL
WBC NRBC COR # BLD AUTO: 14.63 10*3/MM3 (ref 3.4–10.8)

## 2023-12-12 PROCEDURE — 85025 COMPLETE CBC W/AUTO DIFF WBC: CPT

## 2023-12-12 PROCEDURE — 36415 COLL VENOUS BLD VENIPUNCTURE: CPT

## 2023-12-12 PROCEDURE — 82232 ASSAY OF BETA-2 PROTEIN: CPT

## 2023-12-12 PROCEDURE — 84466 ASSAY OF TRANSFERRIN: CPT

## 2023-12-12 PROCEDURE — 85007 BL SMEAR W/DIFF WBC COUNT: CPT

## 2023-12-12 PROCEDURE — 80053 COMPREHEN METABOLIC PANEL: CPT

## 2023-12-12 PROCEDURE — 82784 ASSAY IGA/IGD/IGG/IGM EACH: CPT

## 2023-12-12 PROCEDURE — 83615 LACTATE (LD) (LDH) ENZYME: CPT

## 2023-12-12 PROCEDURE — 82728 ASSAY OF FERRITIN: CPT

## 2023-12-12 PROCEDURE — 83540 ASSAY OF IRON: CPT

## 2023-12-26 NOTE — PROGRESS NOTES
[facility] ONC [facility] GROUP HEMATOLOGY AND ONCOLOGY  [address]  [facility] [address] [phone]    Patient Name: Liset Wright  Encounter Date: 01/02/2024  YOB: 1950  Patient Number: [id number]       REASON FOR VISIT:  Liset Wright is a 73-year-old female who returns in follow-up of stage 0 chronic lymphocytic leukemia (B-CLL). She is seen 59.5 months since her initial visit on 1/28/19. She remains in observation. She is here alone.    I have reviewed the HPI and verified with the patient the accuracy of it. No changes to interval history since the information was documented. Jay Cedeno MD 01/02/24      DIAGNOSTIC ABNORMALITIES:   On 11/28/2018, labs showed a WBC of 13.9 with 78% lymphocytes (ALC 10.9), ANC 2.4, and was otherwise normal. Hemoglobin 12, hematocrit 36.7, MCV 87, platelets 261,000. CMP was normal in its entirety to include a BUN of 11, creatinine 0.78, GFR 79, calcium 9.2, total protein 6.8, and normal liver enzymes.  On 01/11/2019, CT of the abdomen and pelvis with IV contrast at [facility] showed no acute pathology in the abdomen or pelvis (no masses or any abnormalities to account for her left upper quadrant pain with no adenopathy and normal spleen).  On 01/16/2019, Ainsley-Barr virus titers showed an IgG level of 239 (0 - 17.9), Ainsley-Barr virus nuclear antigen IgG 118 (0 - 17.9), but IgM of less than 36 and early antigen of less than 9 (indicating prior infection).  On 01/10/2019, repeat CBC showed a hemoglobin of 11.3, hematocrit 35.5, MCV 91.3, platelets 249,000, WBC 13.29.   On 01/14/2019, blood smear (manual) review showed atypical lymphocytosis, moderate smudge cells present. RBC morphology showed normocytic, normochromic anemia without significant morphologic abnormality. Platelets normal morphology.  On 01/17/2019, peripheral blood was sent for flow cytometry ([facility]). This  revealed chronic lymphocytic leukemia (54% of total cells). Monoclonal B cells have a CLL immunophenotype and are negative for both CD38 and ZAP-70 associated with standard risk disease. PCR for IGVH and p53 mutation and FISH for CLL may provide additional prognostic information. Virtually all of the B cells are monoclonal and express CD19 (dim), CD20 (dim), CD5, CD23, CD11c, and dim surface kappa light chain. They are negative for CD10, CD38, FMC-7, ZAP-70, and surface lambda light chain.   Labs, 01/28/2019: Hemoglobin 13.1, hematocrit 39.5, MCV 90.5, platelets 283,000, WBC 12.2 with 19.3 segs (ANC 2.4), 75.8 lymphocytes (ALC 9.2). CMP normal. GFR 85, calcium 9.9, total protein 7.1, and normal liver enzymes.  (265 - 665). Beta 2 microglobulin 1.3. Serum iron 101, TIBC 333, iron saturation 30%, B12 of 431, folate 15 (each within reference range). Ferritin 23.7 (depressed). HIV screen negative. IgG 742.  Bone marrow biopsy/aspirate, 02/01/2019: PERIPHERAL BLOOD: B cell chronic lymphocytic leukemia. BONE MARROW, UNSPECIFIED SITE, SMEARS, CLOT AND BIOPSY: Involved by B cell chronic lymphocytic leukemia (30%). CLL panel: Positive for a deletion of DLEUI, DILEU2 on chromosome 13 at 04 (58,0% of cells), consistent with CLL. Isolated del 1304.3 is associated with a favorable clinical course. Three of the twenty mitotic cells examined were characterized by loss of one copy of the X chromosome and a deletion in the long arm of chromosome 13.   Stools OB x 3, 02/25/2019. Negative x 3    PREVIOUS INTERVENTIONS:   Observation        Problem List Items Addressed This Visit    None          Oncology/Hematology History    No history exists.       PAST MEDICAL HISTORY:  ALLERGIES:  Allergies   Allergen Reactions    Levofloxacin Paresthesia    Phenergan [Promethazine Hcl] Other (See Comments)     Jerky movements    Promethazine Unknown - High Severity    Ramipril Palpitations     CURRENT MEDICATIONS:  Outpatient Encounter  Medications as of 1/2/2024   Medication Sig Dispense Refill    albuterol sulfate  (90 Base) MCG/ACT inhaler Inhale 2 puffs Every 4 (Four) Hours As Needed for Wheezing. 18 g 4    dicyclomine (BENTYL) 10 MG capsule Take 1 capsule by mouth 2 (Two) Times a Day.      esomeprazole (nexIUM) 40 MG capsule Take 1 capsule by mouth 2 (Two) Times a Day. (Patient taking differently: Take 1 capsule by mouth Daily.) 60 capsule 11    metoprolol tartrate (LOPRESSOR) 100 MG tablet Take 1 tablet by mouth 3 (Three) Times a Day.      omeprazole (priLOSEC) 40 MG capsule Take 1 capsule by mouth Daily.      valsartan (DIOVAN) 160 MG tablet Take 1 tablet by mouth Daily. for blood pressure       No facility-administered encounter medications on file as of 1/2/2024.     ADULT ILLNESSES:   Chronic lymphocytic leukemia ( ICD-10:C91.10 ;Chronic lymphocytic leukemia of B-cell type not having achieved remission   Abdominal pain ( ICD-10:R10.12 ;Left upper quadrant pain   Asthma ( Dr. Johnson; ICD-10:J45.909 ;Unspecified asthma, uncomplicated )   Cervical degenerative disk disease ( x-ray 11/2017; ICD-10:M50.20 ;Other cervical disc displacement, unspecified cervical region )   Erosive gastritis ( Dr. Kaur; ICD-10:K29.60 ;Other gastritis without bleeding )   Gastroesophageal reflux disease ( GERD, Dr. Kaur; ICD-10:K21.9 ;Gastro-esophageal reflux disease without esophagitis )   Gastroparesis ( ICD-10:K31.84 ;Gastroparesis   Hypertension ( ICD-10:I10 ;Essential (primary) hypertension   Irritable bowel syndrome ( ICD-10:K58.9 ;Irritable bowel syndrome without diarrhea   Migraines ( ICD-10:G43.909 ;Migraine, unspecified, not intractable, without status migrainosus   Normocytic anemia ( ICD-10:D64.9 ;Anemia, unspecified   Palpitations ( normal heart cath, 02/2003, bilateral ultrasound showed no significant stenosis, 06/2015, stress echo 05/2018 normal; ICD-10:R00.2 ;Palpitations )   Schatzki esophageal ring ( erosive gastritis/gastroesophageal  "reflux disease, Dr. Kaur; ICD-10:K22.2 ;Esophageal obstruction )  Right hip fracture following a fall, 04/13/2021.  Non-surgical management      SURGERIES:   Cholecystectomy   Cardiac catheterization, 02/2003   Colonoscopy, 2017. \"No polyps.\" 5 years. Dr. Kaur   EGD (upper endoscopy), 2018, normal, Dr. Kaur   Hysterectomy, total      ADULT ILLNESSES:  Patient Active Problem List   Diagnosis Code    Palpitations R00.2    PVCs (premature ventricular contractions) I49.3    Essential hypertension I10    Epigastric pain R10.13    CLL (chronic lymphocytic leukemia) C91.10    Bronchitis J40    Restrictive lung disease J98.4    Encounter for screening for malignant neoplasm of colon Z12.11    Right ventricular dilation I51.7    Chest pain R07.9    Closed fracture of pelvis with routine healing S32.9XXD    Degeneration of lumbar or lumbosacral intervertebral disc M51.37    Non-smoker Z78.9    Body mass index (BMI) of 23.0 to 23.9 in adult Z68.23    Gastroesophageal reflux disease K21.9    Leukemia C95.90    Nausea R11.0    Alpha-1-antitrypsin deficiency carrier Z14.8    Low serum IgG for age R76.8     SURGERIES:  Past Surgical History:   Procedure Laterality Date    CARDIAC CATHETERIZATION      CHOLECYSTECTOMY      COLONOSCOPY  10/16/2015    normal    COLONOSCOPY N/A 10/19/2020    Procedure: COLONOSCOPY WITH ANESTHESIA;  Surgeon: Grant Kaur DO;  Location: [facility] ENDOSCOPY;  Service: Gastroenterology;  Laterality: N/A;  pre: screening  post: normal  Harry Hastings MD    ENDOSCOPY N/A 11/23/2016    normal    ENDOSCOPY N/A 12/11/2018    Procedure: ESOPHAGOGASTRODUODENOSCOPY WITH ANESTHESIA;  Surgeon: Grant Kaur DO;  Location: [facility] ENDOSCOPY;  Service: Gastroenterology    ENDOSCOPY N/A 11/25/2020    Procedure: ESOPHAGOGASTRODUODENOSCOPY WITH ANESTHESIA;  Surgeon: Grant Kaur DO;  Location: [facility] ENDOSCOPY;  Service: Gastroenterology;  Laterality: N/A;  preop; chest pain  postop; normal   PCP Van " Venkata     ENDOSCOPY N/A 11/11/2022    Procedure: ESOPHAGOGASTRODUODENOSCOPY WITH ANESTHESIA;  Surgeon: Grant Kaur, DO;  Location: [facility] ENDOSCOPY;  Service: Gastroenterology;  Laterality: N/A;  Pre: Nausea  Post:normal  Harry Hastings MD    HYSTERECTOMY       HEALTH MAINTENANCE ITEMS:  Health Maintenance Due   Topic Date Due    TDAP/TD VACCINES (1 - Tdap) Never done    ZOSTER VACCINE (1 of 2) Never done    HEPATITIS C SCREENING  Never done    DXA SCAN  01/14/2023    COVID-19 Vaccine (6 - 2023-24 season) 09/01/2023    ANNUAL WELLNESS VISIT  11/15/2023       <no information>  Last Completed Colonoscopy            COLORECTAL CANCER SCREENING (COLONOSCOPY - Every 5 Years) Next due on 10/19/2025      10/19/2020  Surgical Procedure: COLONOSCOPY    10/19/2020  COLONOSCOPY    02/25/2019  Occult Blood X 3, Stool - Stool, Per Rectum    10/16/2015  SCANNED - COLONOSCOPY    06/17/2011  SCANNED - COLONOSCOPY    Only the first 5 history entries have been loaded, but more history exists.                  Immunization History   Administered Date(s) Administered    COVID-19 (PFIZER) BIVALENT 12+YRS 02/08/2023    COVID-19 (PFIZER) Purple Cap Monovalent 01/15/2021, 02/05/2021, 08/26/2021    FLUAD TRI 65YR+ 10/06/2017, 10/09/2018    Fluzone (or Fluarix & Flulaval for VFC) >6mos 11/01/2019    Fluzone High Dose =>65 Years ([employer] ONLY) 11/11/2021    Fluzone Quad >6mos (Multi-dose) 10/01/2018    Hep B, Unspecified 01/10/2002, 02/15/2002, 05/16/2002    Influenza Quad Vaccine (Inpatient) 10/01/2018    Influenza, Unspecified 11/02/2020    Pneumococcal Conjugate 13-Valent (PCV13) 10/06/2017    Pneumococcal Polysaccharide (PPSV23) 10/01/2018     Last Completed Mammogram            MAMMOGRAM (Every 2 Years) Next due on 1/19/2025 01/19/2023  Outside Claim: HC MAMMOGRAM SCREENING BILAT DIGITAL W CAD    01/19/2023  Outside Claim: CHG SCREENING DIGITAL BREAST TOMOSYNTHESIS BI    01/17/2022  Outside Claim:  MAMMOGRAM SCREENING  "BILAT DIGITAL W CAD    01/17/2022  Outside Claim: CHG SCREENING DIGITAL BREAST TOMOSYNTHESIS BI    01/14/2021  Outside Claim: HC MAMMOGRAM SCREENING BILAT DIGITAL W CAD    Only the first 5 history entries have been loaded, but more history exists.                      FAMILY HISTORY:  Family History   Problem Relation Age of Onset    Cancer Mother     Cancer Father         lung    Cancer Paternal Grandmother         stomach    No Known Problems Brother     No Known Problems Maternal Aunt     No Known Problems Maternal Uncle     No Known Problems Paternal Aunt     No Known Problems Paternal Uncle     No Known Problems Maternal Grandmother     No Known Problems Maternal Grandfather     No Known Problems Paternal Grandfather     No Known Problems Other     Colon cancer Neg Hx     Esophageal cancer Neg Hx     Asthma Neg Hx     Diabetes Neg Hx     Emphysema Neg Hx     Heart failure Neg Hx     Hypertension Neg Hx     Colon polyps Neg Hx      SOCIAL HISTORY:  Social History     Socioeconomic History    Marital status:    Tobacco Use    Smoking status: Never     Passive exposure: Current    Smokeless tobacco: Never   Vaping Use    Vaping Use: Never used   Substance and Sexual Activity    Alcohol use: No    Drug use: No    Sexual activity: Defer       REVIEW OF SYSTEMS:  Constitutional:   The patient's appetite is good, \"no problems at all.\"  Her energy is variable, \"same some days better than others but been good lately.\" She manages her ADLs including chores, errands.  She still drives.  She has regained 4 lb (no weight changes at her prior visit) since her last visit.  She has no fevers, chills, or drenching night sweats.  Her sleep habits seem appropriate.  Ear/Nose/Throat/Mouth:   She reports no ear pains, sinus symptoms, sore throat, nosebleeds, or sore tongue. She has migraine headaches. She denies any hoarseness.   Ocular:   She reports no eye pain, significant change in visual acuity, double vision, or blurry " "vision.  Respiratory:   Says she is prone to respiratory infections due to alpha 1 antitrypsin deficiency.  No lung infections since last visit in October.  Says that through it all her home O2 sats run 97-99% on RA.  No cough.  She has some exertional dyspnea from possible asthma but no chronic cough, significant shortness of breathing at rest or unexplained chest wall pain. Says prior PFTs suggest \"asthma\".  [facility]  Cardiovascular:   She reports no exertional chest pain, chest pressure, or chest heaviness. She reports no claudication. She reports occasional palpitations but denies symptomatic orthostasis.  Gastrointestinal:   She reports no dysphagia, nausea, vomiting, postprandial abdominal pain, bloating, cramping, change in bowel habits, with dark (OTC iron) discoloration of the stool. She reports no rectal bleeding. She reports occasional but chronic constipation requiring stool softeners, lifelong. She has no diarrhea.  Genitourinary:   She reports no urinary burning, frequency, dribbling, or discoloration. She reports no difficulty controlling her bladder. She has no need to urinate frequently through the night.   Musculoskeletal:          She reports no unexplained arthralgias, myalgias, or nighttime leg cramping.  Says she sustained a right hip fracture following a fall, 04/13/2021.  Non-surgical management.  Has only intermittent residual pains, \"sometimes.\".  Extremities:   She reports no trouble with fluid retention or significant leg swelling.  Endocrine:   She reports no problems with excess thirst, excessive urination, vasomotor instability, or unexplained fatigue.  Heme/Lymphatic:   She reports easy bruising but no unexplained bleeding, petechial rashes, or swollen glands.  Skin:   She reports no itching, rashes, or lesions which won't heal.  Neuro:   She reports no loss of consciousness, seizures, fainting spells, or dizziness. She reports no weakness of face, arms, or legs. " "She has no difficulty with speech. She has no tremors. She admits to occasional paresthesias of the hands.   Psych:   She seems generally satisfied with life. She denies depression. She reports no mood swings.       VITAL SIGNS: /88   Pulse 52   Temp 97.4 °F (36.3 °C) (Temporal)   Resp 18   Ht 165.1 cm (65\")   Wt 64.3 kg (141 lb 12.8 oz)   LMP  (LMP Unknown)   SpO2 98%   BMI 23.60 kg/m² Body surface area is 1.71 meters squared.  Pain Score    01/02/24 1006   PainSc: 0-No pain           PHYSICAL EXAMINATION:   General:   She is a pleasant, cooperative, slender but otherwise well-developed, well-nourished, and modestly-kept elderly female who is comfortable at rest. She arrived in the exam room ambulatory. She appears to be her stated age. Her skin color is normal. ECOG 0  Head/Neck:   The patient is anicteric and atraumatic. The trachea is midline. The neck is supple without evidence of jugular venous distention or cervical adenopathy. Prominent, non-tender right sternoclavicular joint.  Eyes:   The pupils are equal, round, and reactive to light. The extraocular movements are full. There is no scleral jaundice or erythema.   Chest:   The respiratory efforts are normal and unhindered. There are soft scattered wheezes, but no rales, rhonchi or asymmetry of breath sounds.  Cardiovascular:   The patient has a regular cardiac rate and rhythm without murmurs, rubs, or gallops. The peripheral pulses are equal and full.  Abdomen:   The belly is soft and flat. There is no rebound or guarding. There is no organomegaly, mass-effect, or tenderness. Bowel sounds are active and of normal character.  Extremities:   There is no evidence of cyanosis, clubbing, or edema.  Rheumatologic:   There is no overt evidence of rheumatoid deformities of the hands. There is no sausaging of the fingers. There is no sign of active synovitis. The gait is normal.  Cutaneous:   There are no overt rashes, disseminated lesions, purpura, " or petechiae.   Lymphatics:   There is no evidence of adenopathy in the cervical, supraclavicular, axillary, inguinal, or femoral areas. There is no splenomegaly.  Neurologic:   The patient is alert, oriented, cooperative, and pleasant. She is appropriately conversant. She ambulated into the exam room without assistance and transferred from chair to exam table unaided. There is no overt dysfunction of the motor, sensory, cerebellar systems.  Psych:   Mood and affect are appropriate for circumstance. Eye contact is appropriate. Normal judgement and decision making.         LABS    Lab Results - Last 18 Months   Lab Units 12/12/23  0914 10/11/23  0849 07/21/23  0920 01/16/23  0810 12/27/22  1009 08/02/22  0743   HEMOGLOBIN g/dL 11.6* 11.4* 11.6* 11.1* 12.3 11.2*   HEMATOCRIT % 36.5 36.3 36.9 35.7 39.1 35.7   MCV fL 92.6 93.1 94.1 97.3* 98.2 95.5   WBC 10*3/mm3 14.63* 15.25* 12.26* 17.80* 19.1* 15.39*   RDW % 13.2 13.0 13.1 14.4 14.8* 13.4   MPV fL 9.6 9.3 9.1 9.2 9.2* 9.3   PLATELETS 10*3/mm3 221 259 315 241 275 277   NEUTROS ABS 10*3/mm3 3.80 2.93 2.79 6.00 3.2 1.88   LYMPHS ABS 10*3/mm3  --   --  8.89*  --  15.1*  --    MONOS ABS 10*3/mm3  --   --  0.42  --  0.40  --    EOS ABS 10*3/mm3 0.29  --  0.08 0.18 0.19  --    BASOS ABS 10*3/mm3 0.15  --  0.05  --  0.20  --    IMMATURE GRANS (ABS) K/uL  --   --   --   --  0.1  --    NEUTROPHIL % % 26.0* 19.2*  --  33.7*  --  12.2*   MONOCYTES % % 3.0* 1.0*  --  1.0*  --  0.8*   BASOPHIL % % 1.0  --   --   --   --   --    ATYP LYMPH % % 8.0*  --   --  10.2*  --  0.8   ANISOCYTOSIS  Slight/1+  --   --   --   --  Mod/2+       Lab Results - Last 18 Months   Lab Units 12/12/23  0914 10/11/23  0849 07/21/23  0920   GLUCOSE mg/dL 132* 113* 108*   SODIUM mmol/L 139 141 138   POTASSIUM mmol/L 4.4 4.2 4.3   CO2 mmol/L 31.0* 28.0 29.0   CHLORIDE mmol/L 100 104 101   ANION GAP mmol/L 8.0 9.0 8.0   CREATININE mg/dL 0.80 0.68 0.69   BUN mg/dL 10 16 15   BUN / CREAT RATIO  12.5 23.5 21.7    CALCIUM mg/dL 9.0 9.3 9.2   ALK PHOS U/L 88 105 105   TOTAL PROTEIN g/dL 6.2 6.5 6.2   ALT (SGPT) U/L 11 11 9   AST (SGOT) U/L 17 16 14   BILIRUBIN mg/dL 0.7 0.6 0.4   ALBUMIN g/dL 4.1 4.3 4.0   GLOBULIN gm/dL 2.1 2.2 2.2       Lab Results - Last 18 Months   Lab Units 23  0914 10/11/23  0849 23  0920 23  0810 22  0743   LDH U/L 221* 224* 197 213 212       Lab Results - Last 18 Months   Lab Units 23  0914 10/11/23  0849 23  0810 22  0743   IRON mcg/dL 114 78 78 90   TIBC mcg/dL 320 308 332 326   IRON SATURATION (TSAT) % 36 25 23 28   FERRITIN ng/mL 138.40 136.80 111.90 184.30*       ASSESSMENT:   1. B cell chronic lymphocytic leukemia (B-CLL):   Stage: 0   Tumor [place]: Lymphocytosis.   Complications of Tumor: None.   Tumor Status: Untreated.   IVGH: Pending.   CD38: Negative.   ZAP-70: Negative.   Isolated del 13q14.3 (favorable)   LDH: 221, 23 (prior: 197 - 599)   B2M: 2, 23 (prior: 1.3 - 2.1)   Ig, 23 (prior: 517 - 742)  23- WBC 14.63; ALC 9.95  (range: WBC 13.0-23.99; ALC 8.64-15.25)  2. Normocytic anemia.    --Repleted ferritin -138, 2022 (from 136; 111; 184; 133; 116; 159, 145.4, 131.5; 53.2; 35; 23).   --Hgb 11.6; MCV 92.6, 23 (prior: Hgb 11.3 - 13.3; MCV 87 - 98.2)   3. Irritable bowel syndrome. On Bentyl  4. Gastroparesis. On omeprazole  5. Migraines. On Excedrin migraines  6. Cervical degenerative disk disease.  No meds        7.  Alpha 1 antitrypsin deficiency. Dr. Johnson.  Now followed by Dr. Vasquez        8.  Palpitations with echo, 2020 showing grade II diastolic dysfunction but EF > 70%.  Dr. Hawk follows        9.  Prominent right sterno-clavicular joint. Asymptomatic         --2020-right sternoclavicular joint x-ray.  Mild arthritic changes of the inferior proximal right clavicle adjacent to the sternoclavicular joint with appropriate alignment.         10. Received COVID # 4 vaccination, 2022        11. Recurrent pulmonary infections.  Followed by pulmonary in [facility]- Dr. Russell  --1/24/2023- Visit with Dr. 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 She reports that recently she had again symptoms of upper respiratory tract infection. Her CT of the chest showed areas of groundglass opacities that are scattered and calcified granulomas.  The patient's recurrent upper respiratory infections symptoms could be related to common variable immune deficiency secondary to low IgG versus immune deficiency secondary to CLL or both. She might benefit from a trial of IVIG. We will proceed with IVIG.  --7/25/23- CT chest- Multiple nodular densities within the lungs bilaterally which have developed in the interval.  PET imaging recommended. Unexpected findings.  Multiple new pulmonary masses.   --9/25/23 - CT chest.  Stable nonaggressive nodules.  Improving bronchiolitis.  Remote granulomatous disease.  No new abnormality.  --11/2/23- Consult with immunology, Dr. Simon-A/P: Recurrent infections-likely borderline IgGs due to abnormal B cells.  Do not suspect common variable hypogammaglobulinemia.  At this time I do not feel she would benefit from immunoglobulin replacement.  That might change if she has documented recurrent pneumonitis with infiltrates by chest x-ray (reports pending) and strong evidence of lack of immune function (vaccine response) and has a low IgA/or IgM.  In the past she has received antibiotics and steroids.  Would definitely avoid the latter if at all possible.  It may be she has higher than average need for antibiotics, however documented bacterial infection by x-rays or culture or highly suspected by labs given her diagnosis of CLL.  Draw labs (diphtheria IgG antibody, IgA, IgG, IgM, lymphocyte subset, pneumococcal IgG 23 serotypes.  Tetanus IgG antibody.  Follow-up 5-6 weeks.        RECOMMENDATIONS:   1.   Apprised of labs from 12/12/23, with  "glucose 132 otherwise normal CMP, stable leukocytosis/lymphocytosis, normal ANC, stable anemia, normal platelets, repleted iron levels, normal (2) B2M,  (prior peak: 299), adequate IgG 596 (prior: 638).    2.   Review consult, 11/22/23 from Dr. Simon of immunology.  A/P;  Does not fell IVIG warranted (see above).  3.   Previously reviewed follow-up Dr. Vasquez, 10/3/23.  A/P:  Lung nodules- CT CHEST WO CONTRAST; Future.  Alpha-1-antitrypsin deficiency carrier.  CVID (common variable immunodeficiency) (HCC).  Pneumonia of right middle lobe due to infectious organism.  CLL (chronic lymphocytic leukemia) (HCC).  OHARA (dyspnea on exertion).  We reviewed the CT images with the patient. There is definite clearing of the prior infiltrate. She does have low IgG level. She also has CLL which predisposes her to recurrent infections. We discussed starting IVIG. I will discuss this with Dr. Dumont her hematologist at [facility].  We will repeat CT of the chest in 6 months to assure stability of her nodules.     4.  Previously discussed the bone marrow biopsy, 02/01 (above). Confirms B-CLL with del 13q14 (favorable)   5.  B- CLL again previously discussed. I had explained that standard therapy has not appreciably altered the nature history of CLL and survival curves demonstrate that CLL is an incurable disease. Randomized studies have failed to show a survival advantage of immediate treatment over delayed treatment for asymptomatic patients with early stage disease. As a result, treatment is generally reserved until the patient has active disease defined as the presence of disease-related symptoms: Weight loss greater than 10%; extreme fatigue; fever greater than 100.5 for 2 weeks with night sweats without evidence of infection (\"B\" symptoms); worsening cytopenias (HGB less than 11; platelets less than 100,000); unexplained recurrent infections; worsening adenopathy, or splenomegaly. She is aware to call should " she develop any of these symptoms.      6.  Reappoint to Dr. Simon Re:  Follow-up tests  7.  Return to the [place] office in 16 weeks with pre-office serum iron, Fe sat, ferritin, CBC and differential, IgG, LDH, B2M.    QUALITY MEASURES:   MEDICAL DECISION MAKING: High Complexity   AMOUNT OF DATA:  Moderate to Extensive    I spent ~40minutes caring for Liset on this date of service. This time includes time spent by me in the following activities: preparing for the visit, reviewing tests, performing a medically appropriate examination and/or evaluation, counseling and educating the patient/family/caregiver, ordering medications, tests, or procedures and documenting information in the medical record.       cc: Harry Hastings MD

## 2024-01-02 ENCOUNTER — OFFICE VISIT (OUTPATIENT)
Dept: ONCOLOGY | Facility: CLINIC | Age: 74
End: 2024-01-02
Payer: MEDICARE

## 2024-01-02 VITALS
HEART RATE: 52 BPM | BODY MASS INDEX: 23.63 KG/M2 | SYSTOLIC BLOOD PRESSURE: 138 MMHG | HEIGHT: 65 IN | DIASTOLIC BLOOD PRESSURE: 88 MMHG | RESPIRATION RATE: 18 BRPM | OXYGEN SATURATION: 98 % | WEIGHT: 141.8 LBS | TEMPERATURE: 97.4 F

## 2024-01-02 DIAGNOSIS — C91.10 CLL (CHRONIC LYMPHOCYTIC LEUKEMIA): Primary | ICD-10-CM

## 2024-02-22 NOTE — PROGRESS NOTES
[facility] - PODIATRY    Today's Date: 02/29/2024     Patient Name: Liset Wright  MRN: [id number]  CSN: [id number]  PCP: Harry Hastings MD  Referring Provider: Harry Hastings MD    SUBJECTIVE     Chief Complaint   Patient presents with    Establish Care     aHrry Hastings MD 02/13/2024 INGROWING NAIL- pt states she is here today for ingrown toe nail on both great toes-pt reports mild pain      HPI: Liset Wright, a 73 y.o.female, comes to clinic as a(n) new patient complaining of ingrown toenail. Patient has h/o Acid Reflux, Alpha-1-Antitrypsin Deficiency, Arrhythmia, Arthritis, Asthma, Leukemia, HTN, Low Back Pain, Palpitations . Patient is non-diabetic. Today patient presents with complaints of IGTNs to her bilateral great toes. She reports this has been an ongoing issue for many many years now. She reports she wishes she would have had them removed a long time ago but was unsure because of the pain. The patient reports she has tried several different things to help prevent the IGTNs like cutting them a certain shape, soaking them, and trying different shoes- none of these interventions seemed to help. Admits pain at 4/10 level today. Denies previous treatment. Denies any constitutional symptoms. No other pedal complaints at this time. Today she is interested in discussing the different treatment options that are available to her for this painful condition.       Past Medical History:   Diagnosis Date    Acid reflux     Alpha-1-antitrypsin deficiency     Arrhythmia     Arthritis     Asthma     Cancer     leukemia    Hypertension     Ingrown toenail     Low back pain     Palpitations      Past Surgical History:   Procedure Laterality Date    CARDIAC CATHETERIZATION      CHOLECYSTECTOMY      COLONOSCOPY  10/16/2015    normal    COLONOSCOPY N/A 10/19/2020    Procedure: COLONOSCOPY WITH ANESTHESIA;  Surgeon: Grant Kaur DO;  Location: [facility] ENDOSCOPY;  Service: Gastroenterology;   Laterality: N/A;  pre: screening  post: normal  Harry Hastings MD    ENDOSCOPY N/A 11/23/2016    normal    ENDOSCOPY N/A 12/11/2018    Procedure: ESOPHAGOGASTRODUODENOSCOPY WITH ANESTHESIA;  Surgeon: Grant Kaur DO;  Location: [facility] ENDOSCOPY;  Service: Gastroenterology    ENDOSCOPY N/A 11/25/2020    Procedure: ESOPHAGOGASTRODUODENOSCOPY WITH ANESTHESIA;  Surgeon: Grant Kaur DO;  Location: [facility] ENDOSCOPY;  Service: Gastroenterology;  Laterality: N/A;  preop; chest pain  postop; normal   PCP Harry Hastings     ENDOSCOPY N/A 11/11/2022    Procedure: ESOPHAGOGASTRODUODENOSCOPY WITH ANESTHESIA;  Surgeon: Grant Kaur DO;  Location: [facility] ENDOSCOPY;  Service: Gastroenterology;  Laterality: N/A;  Pre: Nausea  Post:Harry Saldivar MD    HYSTERECTOMY       Family History   Problem Relation Age of Onset    Cancer Mother     Cancer Father         lung    Cancer Paternal Grandmother         stomach    No Known Problems Brother     No Known Problems Maternal Aunt     No Known Problems Maternal Uncle     No Known Problems Paternal Aunt     No Known Problems Paternal Uncle     No Known Problems Maternal Grandmother     No Known Problems Maternal Grandfather     No Known Problems Paternal Grandfather     No Known Problems Other     Colon cancer Neg Hx     Esophageal cancer Neg Hx     Asthma Neg Hx     Diabetes Neg Hx     Emphysema Neg Hx     Heart failure Neg Hx     Hypertension Neg Hx     Colon polyps Neg Hx      Social History     Socioeconomic History    Marital status:    Tobacco Use    Smoking status: Never     Passive exposure: Current    Smokeless tobacco: Never   Vaping Use    Vaping Use: Never used   Substance and Sexual Activity    Alcohol use: No    Drug use: No    Sexual activity: Defer     Allergies   Allergen Reactions    Levofloxacin Paresthesia    Phenergan [Promethazine Hcl] Other (See Comments)     Jerky movements    Promethazine Unknown - High Severity    Ramipril  Palpitations     Current Outpatient Medications   Medication Sig Dispense Refill    albuterol sulfate  (90 Base) MCG/ACT inhaler Inhale 2 puffs Every 4 (Four) Hours As Needed for Wheezing. 18 g 4    dicyclomine (BENTYL) 10 MG capsule Take 1 capsule by mouth 2 (Two) Times a Day.      esomeprazole (nexIUM) 40 MG capsule Take 1 capsule by mouth 2 (Two) Times a Day. (Patient taking differently: Take 1 capsule by mouth Daily.) 60 capsule 11    metoprolol tartrate (LOPRESSOR) 100 MG tablet Take 1 tablet by mouth 3 (Three) Times a Day.      omeprazole (priLOSEC) 40 MG capsule Take 1 capsule by mouth Daily.      valsartan (DIOVAN) 160 MG tablet Take 1 tablet by mouth Daily. for blood pressure       No current facility-administered medications for this visit.     Review of Systems   Constitutional:  Negative for activity change and fever.   HENT:  Negative for congestion.    Respiratory:  Negative for shortness of breath.    Cardiovascular:  Negative for chest pain and leg swelling.   Gastrointestinal:  Negative for abdominal pain, constipation, diarrhea, nausea and vomiting.   Musculoskeletal:  Positive for arthralgias.        Bilateral foot pain from IGTNs   Skin:  Negative for color change and wound.   Neurological:  Negative for dizziness, weakness and numbness.   Psychiatric/Behavioral:  Negative for agitation, behavioral problems and confusion.        OBJECTIVE     Vitals:    02/29/24 1038   BP: 158/88   Pulse: 50   SpO2: 97%       PHYSICAL EXAM  GEN:   Accompanied by none.     Foot/Ankle Exam    GENERAL  Appearance:  appears stated age  Orientation:  AAOx3  Affect:  appropriate  Gait:  unimpaired  Assistance:  independent  Right shoe gear: casual shoe  Left shoe gear: casual shoe    VASCULAR     Right Foot Vascularity   Dorsalis pedis:  2+  Posterior tibial:  2+  Skin temperature:  warm  Edema grading:  None  CFT:  3  Pedal hair growth:  Present  Varicosities:  none     Left Foot Vascularity   Dorsalis pedis:   2+  Posterior tibial:  2+  Skin temperature:  warm  Edema grading:  None  CFT:  3  Pedal hair growth:  Present  Varicosities:  none     NEUROLOGIC     Right Foot Neurologic   Normal sensation    Light touch sensation: normal  Vibratory sensation: normal  Hot/Cold sensation: normal     Left Foot Neurologic   Normal sensation    Light touch sensation: normal  Vibratory sensation: normal  Hot/Cold sensation:  normal    MUSCULOSKELETAL     Right Foot Musculoskeletal   Ecchymosis:  none  Tenderness:  toe 1 tenderness    Arch:  Normal     Left Foot Musculoskeletal   Ecchymosis:  none  Tenderness:  toe 1 tenderness  Arch:  Normal    MUSCLE STRENGTH     Right Foot Muscle Strength   Normal strength    Foot dorsiflexion:  5  Foot plantar flexion:  5  Foot inversion:  5  Foot eversion:  5     Left Foot Muscle Strength   Normal strength    Foot dorsiflexion:  5  Foot plantar flexion:  5  Foot inversion:  5  Foot eversion:  5    RANGE OF MOTION     Right Foot Range of Motion   Foot and ankle ROM within normal limits       Left Foot Range of Motion   Foot and ankle ROM within normal limits      DERMATOLOGIC      Right Foot Dermatologic   Skin  Right foot skin is intact.   Nails  1.  Positive for elongated, abnormal thickness and ingrown toenail.  2.  Normal.  3.  Normal.  4.  Normal.  5.  Normal.     Left Foot Dermatologic   Skin  Left foot skin is intact.   Nails  1.  Positive for elongated, abnormal thickness and ingrown toenail.  2.  Normal.  3.  Normal.  4.  Normal.  5.  Normal.      RADIOLOGY/NUCLEAR:  No results found.    LABORATORY/CULTURE RESULTS:      PATHOLOGY RESULTS:       ASSESSMENT/PLAN     Diagnoses and all orders for this visit:    1. Bilateral foot pain (Primary)    2. Ingrown nail of great toe of right foot    3. Ingrown nail of great toe of left foot      Comprehensive lower extremity examination and evaluation was performed.  Discussed findings and treatment plan including risks, benefits, and treatment  options with patient in detail. Patient agreed with treatment plan.  Patient may maintain nails and calluses at home utilizing emery board or pumice stone between visits as needed  Patient reports that she was unable to do the procedure today and requests a different day to come back in and have it performed.  Scheduled for Monday March 4th @ 0815.      An After Visit Summary was printed and given to the patient at discharge, including (if requested) any available informative/educational handouts regarding diagnosis, treatment, or medications. All questions were answered to patient/family satisfaction. Should symptoms fail to improve or worsen they agree to call or return to clinic or to go to the Emergency Department. Discussed the importance of following up with any needed screening tests/labs/specialist appointments and any requested follow-up recommended by me today. Importance of maintaining follow-up discussed and patient accepts that missed appointments can delay diagnosis and potentially lead to worsening of conditions.  Return in about 4 days (around 3/4/2024) for Follow-up with APRN, Follow-up in [facility] or sooner if acute issues arise.      This document has been electronically signed by YENNY Hsieh on February 29, 2024 14:58 CST

## 2024-02-28 ENCOUNTER — TELEPHONE (OUTPATIENT)
Dept: PODIATRY | Facility: CLINIC | Age: 74
End: 2024-02-28
Payer: MEDICARE

## 2024-02-29 ENCOUNTER — OFFICE VISIT (OUTPATIENT)
Dept: PODIATRY | Facility: CLINIC | Age: 74
End: 2024-02-29
Payer: MEDICARE

## 2024-02-29 VITALS
HEIGHT: 65 IN | DIASTOLIC BLOOD PRESSURE: 88 MMHG | BODY MASS INDEX: 22.99 KG/M2 | HEART RATE: 50 BPM | WEIGHT: 138 LBS | OXYGEN SATURATION: 97 % | SYSTOLIC BLOOD PRESSURE: 158 MMHG

## 2024-02-29 DIAGNOSIS — L60.0 INGROWN NAIL OF GREAT TOE OF LEFT FOOT: ICD-10-CM

## 2024-02-29 DIAGNOSIS — M79.672 BILATERAL FOOT PAIN: Primary | ICD-10-CM

## 2024-02-29 DIAGNOSIS — L60.0 INGROWN NAIL OF GREAT TOE OF RIGHT FOOT: ICD-10-CM

## 2024-02-29 DIAGNOSIS — M79.671 BILATERAL FOOT PAIN: Primary | ICD-10-CM

## 2024-02-29 NOTE — PROGRESS NOTES
[facility] - PODIATRY    Today's Date: 03/04/2024     Patient Name: Liset Wright  MRN: [id number]  CSN: [id number]  PCP: Harry Hastings MD  Referring Provider: No ref. provider found    SUBJECTIVE     Chief Complaint   Patient presents with    Follow-up     Harry Hastings MD-02/13/2024-PT WILL BE HERE /4 DAY [facility]U [facility]-pt state she is here today for malorie ingrown toe nails-pt reports very mild pain on left great toe     HPI: Liset Wright, a 73 y.o.female, comes to clinic as a(n) established patient complaining of ingrown toenails. Patient has h/o Acid Reflux, Alpha-1-Antitrypsin Deficiency, Arrhythmia, Arthritis, Asthma, Leukemia, HTN, Low Back Pain, Palpitations, and Polyneuropathy . Patient is non-diabetic. Today she is requesting a PNA to the bilateral great toes. She reports IGTNs have been an ongoing issue for several years now. She reports they are often very painful especially when they are in her shoes and pressure is applied to the areas of ingrown nail.  Admits pain at 4/10 level today. Denies previous treatment. Denies any constitutional symptoms. No other pedal complaints at this time.    Past Medical History:   Diagnosis Date    Acid reflux     Alpha-1-antitrypsin deficiency     Arrhythmia     Arthritis     Asthma     Cancer     leukemia    Hypertension     Ingrown toenail     Low back pain     Palpitations      Past Surgical History:   Procedure Laterality Date    CARDIAC CATHETERIZATION      CHOLECYSTECTOMY      COLONOSCOPY  10/16/2015    normal    COLONOSCOPY N/A 10/19/2020    Procedure: COLONOSCOPY WITH ANESTHESIA;  Surgeon: Grant Kaur DO;  Location: [facility] ENDOSCOPY;  Service: Gastroenterology;  Laterality: N/A;  pre: screening  post: normal  Harry Hastings MD    ENDOSCOPY N/A 11/23/2016    normal    ENDOSCOPY N/A 12/11/2018    Procedure: ESOPHAGOGASTRODUODENOSCOPY WITH ANESTHESIA;  Surgeon: Grant Kaur DO;  Location: [facility] ENDOSCOPY;   Service: Gastroenterology    ENDOSCOPY N/A 11/25/2020    Procedure: ESOPHAGOGASTRODUODENOSCOPY WITH ANESTHESIA;  Surgeon: Grant Kaur DO;  Location: [facility] ENDOSCOPY;  Service: Gastroenterology;  Laterality: N/A;  preop; chest pain  postop; normal   PCP Harry Hastings     ENDOSCOPY N/A 11/11/2022    Procedure: ESOPHAGOGASTRODUODENOSCOPY WITH ANESTHESIA;  Surgeon: Grant Kaur DO;  Location: [facility] ENDOSCOPY;  Service: Gastroenterology;  Laterality: N/A;  Pre: Nausea  Post:normal  Harry Hastings MD    HYSTERECTOMY       Family History   Problem Relation Age of Onset    Cancer Mother     Cancer Father         lung    Cancer Paternal Grandmother         stomach    No Known Problems Brother     No Known Problems Maternal Aunt     No Known Problems Maternal Uncle     No Known Problems Paternal Aunt     No Known Problems Paternal Uncle     No Known Problems Maternal Grandmother     No Known Problems Maternal Grandfather     No Known Problems Paternal Grandfather     No Known Problems Other     Colon cancer Neg Hx     Esophageal cancer Neg Hx     Asthma Neg Hx     Diabetes Neg Hx     Emphysema Neg Hx     Heart failure Neg Hx     Hypertension Neg Hx     Colon polyps Neg Hx      Social History     Socioeconomic History    Marital status:    Tobacco Use    Smoking status: Never     Passive exposure: Current    Smokeless tobacco: Never   Vaping Use    Vaping status: Never Used   Substance and Sexual Activity    Alcohol use: No    Drug use: No    Sexual activity: Defer     Allergies   Allergen Reactions    Levofloxacin Paresthesia    Phenergan [Promethazine Hcl] Other (See Comments)     Jerky movements    Promethazine Unknown - High Severity    Ramipril Palpitations     Current Outpatient Medications   Medication Sig Dispense Refill    albuterol sulfate  (90 Base) MCG/ACT inhaler Inhale 2 puffs Every 4 (Four) Hours As Needed for Wheezing. 18 g 4    dicyclomine (BENTYL) 10 MG capsule Take 1 capsule by  mouth 2 (Two) Times a Day.      esomeprazole (nexIUM) 40 MG capsule Take 1 capsule by mouth 2 (Two) Times a Day. (Patient taking differently: Take 1 capsule by mouth Daily.) 60 capsule 11    metoprolol tartrate (LOPRESSOR) 100 MG tablet Take 1 tablet by mouth 3 (Three) Times a Day.      omeprazole (priLOSEC) 40 MG capsule Take 1 capsule by mouth Daily.      valsartan (DIOVAN) 160 MG tablet Take 1 tablet by mouth Daily. for blood pressure       No current facility-administered medications for this visit.     Review of Systems   Constitutional:  Negative for activity change and fever.   HENT:  Negative for congestion.    Respiratory:  Negative for shortness of breath.    Cardiovascular:  Negative for chest pain and leg swelling.   Gastrointestinal:  Negative for abdominal pain, constipation, diarrhea, nausea and vomiting.   Musculoskeletal:  Positive for arthralgias.        Bilateral foot pain from IGTNs   Skin:  Negative for color change and wound.   Neurological:  Negative for dizziness, weakness and numbness.   Psychiatric/Behavioral:  Negative for agitation, behavioral problems and confusion.        OBJECTIVE     Vitals:    03/04/24 0833   BP: 160/88   Pulse: 53   SpO2: 96%         PHYSICAL EXAM  GEN:   Accompanied by none.     Foot/Ankle Exam    GENERAL  Appearance:  appears stated age  Orientation:  AAOx3  Affect:  appropriate  Gait:  unimpaired  Assistance:  independent  Right shoe gear: casual shoe  Left shoe gear: casual shoe    VASCULAR     Right Foot Vascularity   Dorsalis pedis:  2+  Posterior tibial:  2+  Skin temperature:  warm  Edema grading:  None  CFT:  3  Pedal hair growth:  Present  Varicosities:  none     Left Foot Vascularity   Dorsalis pedis:  2+  Posterior tibial:  2+  Skin temperature:  warm  Edema grading:  None  CFT:  3  Pedal hair growth:  Present  Varicosities:  none     NEUROLOGIC     Right Foot Neurologic   Light touch sensation: diminished  Vibratory sensation: normal  Hot/Cold  sensation: normal     Left Foot Neurologic   Light touch sensation: diminished  Vibratory sensation: normal  Hot/Cold sensation:  normal    MUSCULOSKELETAL     Right Foot Musculoskeletal   Ecchymosis:  none  Tenderness:  toe 1 tenderness    Arch:  Normal     Left Foot Musculoskeletal   Ecchymosis:  none  Tenderness:  toe 1 tenderness  Arch:  Normal    MUSCLE STRENGTH     Right Foot Muscle Strength   Normal strength    Foot dorsiflexion:  5  Foot plantar flexion:  5  Foot inversion:  5  Foot eversion:  5     Left Foot Muscle Strength   Normal strength    Foot dorsiflexion:  5  Foot plantar flexion:  5  Foot inversion:  5  Foot eversion:  5    RANGE OF MOTION     Right Foot Range of Motion   Foot and ankle ROM within normal limits       Left Foot Range of Motion   Foot and ankle ROM within normal limits      DERMATOLOGIC      Right Foot Dermatologic   Skin  Right foot skin is intact.   Nails  1.  Positive for elongated, abnormal thickness and ingrown toenail.  2.  Normal.  3.  Normal.  4.  Normal.  5.  Normal.     Left Foot Dermatologic   Skin  Left foot skin is intact.   Nails  1.  Positive for elongated, abnormal thickness and ingrown toenail.  2.  Normal.  3.  Normal.  4.  Normal.  5.  Normal.      RADIOLOGY/NUCLEAR:  No results found.    LABORATORY/CULTURE RESULTS:      PATHOLOGY RESULTS:       ASSESSMENT/PLAN     Diagnoses and all orders for this visit:    1. Bilateral foot pain (Primary)    2. Ingrown nail of great toe of right foot  -     lidocaine (XYLOCAINE) 2% injection 5 mL  -     lidocaine (XYLOCAINE) 2% injection 5 mL  -     Nail Removal    3. Ingrown nail of great toe of left foot  -     lidocaine (XYLOCAINE) 2% injection 5 mL  -     lidocaine (XYLOCAINE) 2% injection 5 mL  -     Nail Removal    4. Polyneuropathy        Comprehensive lower extremity examination and evaluation was performed.  Discussed findings and treatment plan including risks, benefits, and treatment options with patient in detail.  Patient agreed with treatment plan.  Patient may maintain nails and calluses at home utilizing emery board or pumice stone between visits as needed  After written consent obtained, total/partial nail avulsion(s) performed as documented in procedure note. Post-procedure instructions given.   Recommended patient soak both toes in a warm epsom salt foot bath then apply antibiotic ointment and light bandage to areas of PNA about 2-3 times a day for the next couple weeks.  Educated on signs and symptoms of infection (including warmth/fever, purulent drainage, odor, redness, or pain). Instructed patient to call back and let us know if she is experiencing any of those symptoms.      An After Visit Summary was printed and given to the patient at discharge, including (if requested) any available informative/educational handouts regarding diagnosis, treatment, or medications. All questions were answered to patient/family satisfaction. Should symptoms fail to improve or worsen they agree to call or return to clinic or to go to the Emergency Department. Discussed the importance of following up with any needed screening tests/labs/specialist appointments and any requested follow-up recommended by me today. Importance of maintaining follow-up discussed and patient accepts that missed appointments can delay diagnosis and potentially lead to worsening of conditions.  Return in about 2 weeks (around 3/18/2024) for Recheck, Follow-up with APRN., or sooner if acute issues arise.    Nail Removal    Date/Time: 3/4/2024 12:09 PM    Performed by: Isabel Cantrell APRN  Authorized by: Isabel Cantrell APRN  Location: right foot  Location details: right big toe  Anesthesia: digital block    Anesthesia:  Local Anesthetic: lidocaine 2% without epinephrine  Anesthetic total: 5 mL    Sedation:  Patient sedated: no    Preparation: skin prepped with providone-iodine  Amount removed: partial  Side: lateral and medial  Wedge excision of skin  of nail fold: no  Nail bed sutured: no  Nail matrix removed: partial  Removed nail replaced and anchored: no  Dressing: 4x4, antibiotic ointment, gauze roll and dressing applied  Patient tolerance: patient tolerated the procedure well with no immediate complications  Comments: Flushed with alcohol, Silvadene and dressing applied.       Nail Removal    Date/Time: 3/4/2024 12:10 PM    Performed by: Isabel Cantrell APRN  Authorized by: Isabel Cantrell APRN  Location: left foot  Location details: left big toe  Anesthesia: digital block    Anesthesia:  Local Anesthetic: lidocaine 2% without epinephrine  Anesthetic total: 5 mL    Sedation:  Patient sedated: no    Preparation: skin prepped with providone-iodine and sterile field established  Amount removed: partial  Side: lateral and medial  Wedge excision of skin of nail fold: no  Nail bed sutured: no  Nail matrix removed: partial  Removed nail replaced and anchored: no  Dressing: 4x4, antibiotic ointment, gauze roll and dressing applied  Patient tolerance: patient tolerated the procedure well with no immediate complications  Comments: Flushed with Alcohol, Silvadene and dressing applied.         This document has been electronically signed by YENNY Hsieh on March 4, 2024 12:12 CST

## 2024-03-01 ENCOUNTER — TELEPHONE (OUTPATIENT)
Dept: PODIATRY | Facility: CLINIC | Age: 74
End: 2024-03-01
Payer: MEDICARE

## 2024-03-01 NOTE — TELEPHONE ENCOUNTER
Called patient regarding appt on 03/04/2024. Left message for patient to return call if any questions or concerns arise.

## 2024-03-04 ENCOUNTER — OFFICE VISIT (OUTPATIENT)
Dept: PODIATRY | Facility: CLINIC | Age: 74
End: 2024-03-04
Payer: MEDICARE

## 2024-03-04 VITALS
OXYGEN SATURATION: 96 % | SYSTOLIC BLOOD PRESSURE: 160 MMHG | BODY MASS INDEX: 22.99 KG/M2 | WEIGHT: 138 LBS | HEIGHT: 65 IN | DIASTOLIC BLOOD PRESSURE: 88 MMHG | HEART RATE: 53 BPM

## 2024-03-04 DIAGNOSIS — M79.672 BILATERAL FOOT PAIN: Primary | ICD-10-CM

## 2024-03-04 DIAGNOSIS — G62.9 POLYNEUROPATHY: ICD-10-CM

## 2024-03-04 DIAGNOSIS — L60.0 INGROWN NAIL OF GREAT TOE OF RIGHT FOOT: ICD-10-CM

## 2024-03-04 DIAGNOSIS — L60.0 INGROWN NAIL OF GREAT TOE OF LEFT FOOT: ICD-10-CM

## 2024-03-04 DIAGNOSIS — M79.671 BILATERAL FOOT PAIN: Primary | ICD-10-CM

## 2024-03-04 PROCEDURE — 3079F DIAST BP 80-89 MM HG: CPT

## 2024-03-04 PROCEDURE — 1160F RVW MEDS BY RX/DR IN RCRD: CPT

## 2024-03-04 PROCEDURE — 3077F SYST BP >= 140 MM HG: CPT

## 2024-03-04 PROCEDURE — 11750 EXCISION NAIL&NAIL MATRIX: CPT

## 2024-03-04 PROCEDURE — 1159F MED LIST DOCD IN RCRD: CPT

## 2024-03-04 PROCEDURE — 99213 OFFICE O/P EST LOW 20 MIN: CPT

## 2024-03-04 RX ORDER — LIDOCAINE HYDROCHLORIDE 20 MG/ML
5 INJECTION, SOLUTION INFILTRATION; PERINEURAL ONCE
Status: COMPLETED | OUTPATIENT
Start: 2024-03-04 | End: 2024-03-04

## 2024-03-04 RX ADMIN — LIDOCAINE HYDROCHLORIDE 5 ML: 20 INJECTION, SOLUTION INFILTRATION; PERINEURAL at 08:42

## 2024-03-11 NOTE — PROGRESS NOTES
[facility] - PODIATRY    Today's Date: 03/18/2024     Patient Name: Liset Wright  MRN: [id number]  CSN: [id number]  PCP: Harry Hastings MD  Referring Provider: No ref. provider found    SUBJECTIVE     Chief Complaint   Patient presents with    Follow-up     Harry Hastings MD-02/13/2024 2 WK RECHECK-pt is here today for recheck on nail avulsion-pt reports that are still causing pain-pt reports pain level at 4/10     HPI: Liset Wright, a 73 y.o.female, comes to clinic as a(n) established patient complaining of ingrown toenails. Patient has h/o Acid Reflux, Alpha-1-Antitrypsin Deficiency, Arrhythmia, Arthritis, Asthma, Leukemia, HTN, Low Back Pain, Palpitations, and Polyneuropathy . Patient is non-diabetic. Today she is returning for a 2 week recheck after bilateral great toenail PNA. She does report improvement to PNA sites but states they are still slightly painful especially if she puts closed toe shoes on. She states she only did the warm epsom salt foot baths occasionally over the last couple of weeks. Admits pain at 4/10 level today. Relates previous treatment(s) including bilateral PNA . Denies any constitutional symptoms. No other pedal complaints at this time.    Past Medical History:   Diagnosis Date    Acid reflux     Alpha-1-antitrypsin deficiency     Arrhythmia     Arthritis     Asthma     Cancer     leukemia    Hypertension     Ingrown toenail     Low back pain     Palpitations      Past Surgical History:   Procedure Laterality Date    CARDIAC CATHETERIZATION      CHOLECYSTECTOMY      COLONOSCOPY  10/16/2015    normal    COLONOSCOPY N/A 10/19/2020    Procedure: COLONOSCOPY WITH ANESTHESIA;  Surgeon: Grant Kaur DO;  Location: [facility] ENDOSCOPY;  Service: Gastroenterology;  Laterality: N/A;  pre: screening  post: normal  Harry Hastings MD    ENDOSCOPY N/A 11/23/2016    normal    ENDOSCOPY N/A 12/11/2018    Procedure: ESOPHAGOGASTRODUODENOSCOPY WITH ANESTHESIA;  Surgeon:  Grant Kaur DO;  Location: [facility] ENDOSCOPY;  Service: Gastroenterology    ENDOSCOPY N/A 11/25/2020    Procedure: ESOPHAGOGASTRODUODENOSCOPY WITH ANESTHESIA;  Surgeon: Grant Kaur DO;  Location: [facility] ENDOSCOPY;  Service: Gastroenterology;  Laterality: N/A;  preop; chest pain  postop; normal   PCP Harry Hastings     ENDOSCOPY N/A 11/11/2022    Procedure: ESOPHAGOGASTRODUODENOSCOPY WITH ANESTHESIA;  Surgeon: Grant Kaur DO;  Location: [facility] ENDOSCOPY;  Service: Gastroenterology;  Laterality: N/A;  Pre: Nausea  Post:normal  Harry Hastings MD    HYSTERECTOMY       Family History   Problem Relation Age of Onset    Cancer Mother     Cancer Father         lung    Cancer Paternal Grandmother         stomach    No Known Problems Brother     No Known Problems Maternal Aunt     No Known Problems Maternal Uncle     No Known Problems Paternal Aunt     No Known Problems Paternal Uncle     No Known Problems Maternal Grandmother     No Known Problems Maternal Grandfather     No Known Problems Paternal Grandfather     No Known Problems Other     Colon cancer Neg Hx     Esophageal cancer Neg Hx     Asthma Neg Hx     Diabetes Neg Hx     Emphysema Neg Hx     Heart failure Neg Hx     Hypertension Neg Hx     Colon polyps Neg Hx      Social History     Socioeconomic History    Marital status:    Tobacco Use    Smoking status: Never     Passive exposure: Current    Smokeless tobacco: Never   Vaping Use    Vaping status: Never Used   Substance and Sexual Activity    Alcohol use: No    Drug use: No    Sexual activity: Defer     Allergies   Allergen Reactions    Levofloxacin Paresthesia    Phenergan [Promethazine Hcl] Other (See Comments)     Jerky movements    Promethazine Unknown - High Severity    Ramipril Palpitations     Current Outpatient Medications   Medication Sig Dispense Refill    albuterol sulfate  (90 Base) MCG/ACT inhaler Inhale 2 puffs Every 4 (Four) Hours As Needed for Wheezing. 18 g 4     dicyclomine (BENTYL) 10 MG capsule Take 1 capsule by mouth 2 (Two) Times a Day.      esomeprazole (nexIUM) 40 MG capsule Take 1 capsule by mouth 2 (Two) Times a Day. (Patient taking differently: Take 1 capsule by mouth Daily.) 60 capsule 11    metoprolol tartrate (LOPRESSOR) 100 MG tablet Take 1 tablet by mouth 3 (Three) Times a Day.      omeprazole (priLOSEC) 40 MG capsule Take 1 capsule by mouth Daily.      valsartan (DIOVAN) 160 MG tablet Take 1 tablet by mouth Daily. for blood pressure      cephalexin (Keflex) 500 MG capsule Take 1 capsule by mouth 2 (Two) Times a Day for 7 days. 14 capsule 0     No current facility-administered medications for this visit.     Review of Systems   Constitutional:  Negative for activity change and fever.   HENT:  Negative for congestion.    Respiratory:  Negative for shortness of breath.    Cardiovascular:  Negative for chest pain and leg swelling.   Gastrointestinal:  Negative for abdominal pain, constipation, diarrhea, nausea and vomiting.   Musculoskeletal:  Positive for arthralgias.        Bilateral toe pain to PNA sites.   Skin:  Negative for color change and wound.   Neurological:  Negative for dizziness, weakness and numbness.   Psychiatric/Behavioral:  Negative for agitation, behavioral problems and confusion.        OBJECTIVE     Vitals:    03/18/24 0810   BP: 150/82   Pulse: 50   SpO2: 97%           PHYSICAL EXAM  GEN:   Accompanied by none.     Foot/Ankle Exam    GENERAL  Appearance:  appears stated age  Orientation:  AAOx3  Affect:  appropriate  Gait:  unimpaired  Assistance:  independent  Right shoe gear: casual shoe  Left shoe gear: casual shoe    VASCULAR     Right Foot Vascularity   Dorsalis pedis:  2+  Posterior tibial:  2+  Skin temperature:  warm  Edema grading:  None  CFT:  3  Pedal hair growth:  Present  Varicosities:  none     Left Foot Vascularity   Dorsalis pedis:  2+  Posterior tibial:  2+  Skin temperature:  warm  Edema grading:  None  CFT:  3  Pedal hair  growth:  Present  Varicosities:  none     NEUROLOGIC     Right Foot Neurologic   Light touch sensation: diminished  Vibratory sensation: normal  Hot/Cold sensation: normal     Left Foot Neurologic   Light touch sensation: diminished  Vibratory sensation: normal  Hot/Cold sensation:  normal    MUSCULOSKELETAL     Right Foot Musculoskeletal   Ecchymosis:  none  Tenderness:  toe 1 tenderness    Arch:  Normal     Left Foot Musculoskeletal   Ecchymosis:  none  Tenderness:  toe 1 tenderness  Arch:  Normal    MUSCLE STRENGTH     Right Foot Muscle Strength   Normal strength    Foot dorsiflexion:  5  Foot plantar flexion:  5  Foot inversion:  5  Foot eversion:  5     Left Foot Muscle Strength   Normal strength    Foot dorsiflexion:  5  Foot plantar flexion:  5  Foot inversion:  5  Foot eversion:  5    RANGE OF MOTION     Right Foot Range of Motion   Foot and ankle ROM within normal limits       Left Foot Range of Motion   Foot and ankle ROM within normal limits      DERMATOLOGIC      Right Foot Dermatologic   Skin  Right foot skin is intact.   Nails  1.  Positive for elongated, abnormal thickness and ingrown toenail.  2.  Normal.  3.  Normal.  4.  Normal.  5.  Normal.     Left Foot Dermatologic   Skin  Left foot skin is intact.   Nails  1.  Positive for elongated, abnormal thickness and ingrown toenail.  2.  Normal.  3.  Normal.  4.  Normal.  5.  Normal.     Right foot additional comments: Site of PNA healing well. Slight redness noted. Slight pain upon manipulation.     Left foot additional comments: Site of PNA healing well. Slight redness noted. Slight pain upon manipulation.  Patient reports this site had a small amount of drainage this morning when she went to put her socks on.       RADIOLOGY/NUCLEAR:  No results found.    LABORATORY/CULTURE RESULTS:      PATHOLOGY RESULTS:       ASSESSMENT/PLAN     Diagnoses and all orders for this visit:    1. Nail avulsion, toe, subsequent encounter (Primary)  -     cephalexin  (Keflex) 500 MG capsule; Take 1 capsule by mouth 2 (Two) Times a Day for 7 days.  Dispense: 14 capsule; Refill: 0    2. Toe pain, bilateral  -     cephalexin (Keflex) 500 MG capsule; Take 1 capsule by mouth 2 (Two) Times a Day for 7 days.  Dispense: 14 capsule; Refill: 0    3. Polyneuropathy          Comprehensive lower extremity examination and evaluation was performed.  Discussed findings and treatment plan including risks, benefits, and treatment options with patient in detail. Patient agreed with treatment plan.  Patient may maintain nails and calluses at home utilizing emery board or pumice stone between visits as needed  After written consent obtained, total/partial nail avulsion(s) performed as documented in procedure note. Post-procedure instructions given.   Overall sites of PNA healing well.   There is slight redness noted around bilateral great toenails and slight pain upon manipulation- ordered course of Keflex today.  Encouraged patient to continue with warm epsom salt soaks PRN.  Encouraged patient to call back if any other issues arise.      An After Visit Summary was printed and given to the patient at discharge, including (if requested) any available informative/educational handouts regarding diagnosis, treatment, or medications. All questions were answered to patient/family satisfaction. Should symptoms fail to improve or worsen they agree to call or return to clinic or to go to the Emergency Department. Discussed the importance of following up with any needed screening tests/labs/specialist appointments and any requested follow-up recommended by me today. Importance of maintaining follow-up discussed and patient accepts that missed appointments can delay diagnosis and potentially lead to worsening of conditions.  Return if symptoms worsen or fail to improve., or sooner if acute issues arise.    This document has been electronically signed by YENNY Hsieh on March 18, 2024 08:39 CDT

## 2024-03-15 ENCOUNTER — TELEPHONE (OUTPATIENT)
Dept: PODIATRY | Facility: CLINIC | Age: 74
End: 2024-03-15
Payer: MEDICARE

## 2024-03-15 NOTE — TELEPHONE ENCOUNTER
Called patient regarding appt on 03/18/2024. Left message for patient to return call if any questions or concerns arise.

## 2024-03-18 ENCOUNTER — OFFICE VISIT (OUTPATIENT)
Dept: PODIATRY | Facility: CLINIC | Age: 74
End: 2024-03-18
Payer: MEDICARE

## 2024-03-18 VITALS
HEART RATE: 50 BPM | OXYGEN SATURATION: 97 % | HEIGHT: 65 IN | SYSTOLIC BLOOD PRESSURE: 150 MMHG | DIASTOLIC BLOOD PRESSURE: 82 MMHG | WEIGHT: 138 LBS | BODY MASS INDEX: 22.99 KG/M2

## 2024-03-18 DIAGNOSIS — G62.9 POLYNEUROPATHY: ICD-10-CM

## 2024-03-18 DIAGNOSIS — M79.675 TOE PAIN, BILATERAL: ICD-10-CM

## 2024-03-18 DIAGNOSIS — M79.674 TOE PAIN, BILATERAL: ICD-10-CM

## 2024-03-18 DIAGNOSIS — S91.209D NAIL AVULSION, TOE, SUBSEQUENT ENCOUNTER: Primary | ICD-10-CM

## 2024-03-18 PROCEDURE — 1159F MED LIST DOCD IN RCRD: CPT

## 2024-03-18 PROCEDURE — 99213 OFFICE O/P EST LOW 20 MIN: CPT

## 2024-03-18 PROCEDURE — 1160F RVW MEDS BY RX/DR IN RCRD: CPT

## 2024-03-18 PROCEDURE — 3077F SYST BP >= 140 MM HG: CPT

## 2024-03-18 PROCEDURE — 3079F DIAST BP 80-89 MM HG: CPT

## 2024-03-18 RX ORDER — CEPHALEXIN 500 MG/1
500 CAPSULE ORAL 2 TIMES DAILY
Qty: 14 CAPSULE | Refills: 0 | Status: SHIPPED | OUTPATIENT
Start: 2024-03-18 | End: 2024-03-25

## 2024-04-03 ENCOUNTER — HOSPITAL ENCOUNTER (OUTPATIENT)
Dept: CT IMAGING | Age: 74
Discharge: HOME | End: 2024-04-03
Payer: MEDICARE

## 2024-04-03 DIAGNOSIS — R91.8 LUNG NODULES: ICD-10-CM

## 2024-04-03 PROCEDURE — 71250 CT THORAX DX C-: CPT

## 2024-04-09 ENCOUNTER — OFFICE VISIT (OUTPATIENT)
Dept: PULMONOLOGY | Age: 74
End: 2024-04-09
Payer: MEDICARE

## 2024-04-09 VITALS
HEART RATE: 61 BPM | TEMPERATURE: 97.5 F | RESPIRATION RATE: 18 BRPM | OXYGEN SATURATION: 100 % | HEIGHT: 65 IN | SYSTOLIC BLOOD PRESSURE: 153 MMHG | BODY MASS INDEX: 23.49 KG/M2 | DIASTOLIC BLOOD PRESSURE: 68 MMHG | WEIGHT: 141 LBS

## 2024-04-09 DIAGNOSIS — Z14.8 ALPHA-1-ANTITRYPSIN DEFICIENCY CARRIER: ICD-10-CM

## 2024-04-09 DIAGNOSIS — R91.8 LUNG NODULES: Primary | ICD-10-CM

## 2024-04-09 DIAGNOSIS — D83.9 CVID (COMMON VARIABLE IMMUNODEFICIENCY) (HCC): ICD-10-CM

## 2024-04-09 DIAGNOSIS — C91.10 CLL (CHRONIC LYMPHOCYTIC LEUKEMIA) (HCC): ICD-10-CM

## 2024-04-09 DIAGNOSIS — J18.9 PNEUMONIA OF RIGHT MIDDLE LOBE DUE TO INFECTIOUS ORGANISM: ICD-10-CM

## 2024-04-09 PROCEDURE — G8400 PT W/DXA NO RESULTS DOC: HCPCS | Performed by: INTERNAL MEDICINE

## 2024-04-09 PROCEDURE — 1090F PRES/ABSN URINE INCON ASSESS: CPT | Performed by: INTERNAL MEDICINE

## 2024-04-09 PROCEDURE — 99214 OFFICE O/P EST MOD 30 MIN: CPT | Performed by: INTERNAL MEDICINE

## 2024-04-09 PROCEDURE — 1036F TOBACCO NON-USER: CPT | Performed by: INTERNAL MEDICINE

## 2024-04-09 PROCEDURE — 1123F ACP DISCUSS/DSCN MKR DOCD: CPT | Performed by: INTERNAL MEDICINE

## 2024-04-09 PROCEDURE — G8420 CALC BMI NORM PARAMETERS: HCPCS | Performed by: INTERNAL MEDICINE

## 2024-04-09 PROCEDURE — 3017F COLORECTAL CA SCREEN DOC REV: CPT | Performed by: INTERNAL MEDICINE

## 2024-04-09 PROCEDURE — G8427 DOCREV CUR MEDS BY ELIG CLIN: HCPCS | Performed by: INTERNAL MEDICINE

## 2024-04-09 ASSESSMENT — ENCOUNTER SYMPTOMS
WHEEZING: 0
BACK PAIN: 0
SHORTNESS OF BREATH: 0
ANAL BLEEDING: 0
APNEA: 0
ABDOMINAL DISTENTION: 0
RHINORRHEA: 0
ABDOMINAL PAIN: 0
CHEST TIGHTNESS: 0
COUGH: 0

## 2024-04-09 NOTE — PROGRESS NOTES
Pulmonary and Sleep Medicine    Sophia Hidalgo (:  1950) is a 73 y.o. female,Established patient, here for evaluation of the following chief complaint(s):  Follow-up (6mth f/u chest CT. )      Referring physician:  No referring provider defined for this encounter.     ASSESSMENT/PLAN:  1. Lung nodules  -     CT CHEST WO CONTRAST; Future  2. Alpha-1-antitrypsin deficiency carrier  3. CVID (common variable immunodeficiency) ([facility])  4. Pneumonia of right middle lobe due to infectious organism  5. CLL (chronic lymphocytic leukemia) ([facility])        Will repeat CT of the chest in one year and follow up at that time. She is doing well. Not IVIG candidate per Dr. Liang.        Ara Lafleur MD, [facility], [facility]    Return in about 1 year (around 2025).    SUBJECTIVE/OBJECTIVE:        She is here for follow up on lung nodules and low IGG. She had a follow up CT of the chest that showed non specific tree in bud and further decrease in the size of the lung nodule. She denies any recent exacerbations or complaints since her last visit.         Prior to Visit Medications    Medication Sig Taking? Authorizing Provider   omeprazole (PRILOSEC) 40 MG delayed release capsule Take 1 capsule by mouth in the morning and at bedtime Yes Ara Lafleur MD   dicyclomine (BENTYL) 10 MG capsule Take 1 capsule by mouth 2 times daily Yes Provider, MD Dianne   metoprolol (LOPRESSOR) 100 MG tablet Take 1 tablet by mouth 2 times daily Yes Provider, MD Dianne   valsartan (DIOVAN) 320 MG tablet Take 1 tablet by mouth daily Yes Provider, MD Dianne   doxycycline hyclate (VIBRA-TABS) 100 MG tablet TAKE 1 TABLET BY MOUTH TWICE A DAY FOR INFECTION  Patient not taking: Reported on 2024  Dianne Hirsch MD   meloxicam (MOBIC) 7.5 MG tablet Take 1 tablet by mouth daily  Patient not taking: Reported on 2024  Dianne Hirsch MD   albuterol sulfate HFA (PROVENTIL;VENTOLIN;PROAIR) 108 (90 Base)

## 2024-04-11 ENCOUNTER — LAB (OUTPATIENT)
Dept: LAB | Facility: HOSPITAL | Age: 74
End: 2024-04-11
Payer: MEDICARE

## 2024-04-11 DIAGNOSIS — C91.10 CLL (CHRONIC LYMPHOCYTIC LEUKEMIA): ICD-10-CM

## 2024-04-11 LAB
ALBUMIN SERPL-MCNC: 4.3 G/DL (ref 3.5–5.2)
ALBUMIN/GLOB SERPL: 2.2 G/DL
ALP SERPL-CCNC: 84 U/L (ref 39–117)
ALT SERPL W P-5'-P-CCNC: 10 U/L (ref 1–33)
ANION GAP SERPL CALCULATED.3IONS-SCNC: 7 MMOL/L (ref 5–15)
AST SERPL-CCNC: 18 U/L (ref 1–32)
B2 MICROGLOB SERPL-MCNC: 1.9 MG/L (ref 0.8–2.2)
BILIRUB SERPL-MCNC: 0.6 MG/DL (ref 0–1.2)
BUN SERPL-MCNC: 13 MG/DL (ref 8–23)
BUN/CREAT SERPL: 17.1 (ref 7–25)
CALCIUM SPEC-SCNC: 9.2 MG/DL (ref 8.6–10.5)
CHLORIDE SERPL-SCNC: 103 MMOL/L (ref 98–107)
CO2 SERPL-SCNC: 30 MMOL/L (ref 22–29)
CREAT SERPL-MCNC: 0.76 MG/DL (ref 0.57–1)
DEPRECATED RDW RBC AUTO: 43.3 FL (ref 37–54)
EGFRCR SERPLBLD CKD-EPI 2021: 83.9 ML/MIN/1.73
EOSINOPHIL # BLD MANUAL: 0.12 10*3/MM3 (ref 0–0.4)
EOSINOPHIL NFR BLD MANUAL: 1 % (ref 0.3–6.2)
ERYTHROCYTE [DISTWIDTH] IN BLOOD BY AUTOMATED COUNT: 12.8 % (ref 12.3–15.4)
FERRITIN SERPL-MCNC: 109.2 NG/ML (ref 13–150)
GLOBULIN UR ELPH-MCNC: 2 GM/DL
GLUCOSE SERPL-MCNC: 109 MG/DL (ref 65–99)
HCT VFR BLD AUTO: 37.7 % (ref 34–46.6)
HGB BLD-MCNC: 11.9 G/DL (ref 12–15.9)
IGG1 SER-MCNC: 640 MG/DL (ref 700–1600)
IRON 24H UR-MRATE: 103 MCG/DL (ref 37–145)
IRON SATN MFR SERPL: 31 % (ref 20–50)
LDH SERPL-CCNC: 206 U/L (ref 135–214)
LYMPHOCYTES # BLD MANUAL: 9.1 10*3/MM3 (ref 0.7–3.1)
LYMPHOCYTES NFR BLD MANUAL: 3 % (ref 5–12)
MCH RBC QN AUTO: 29.2 PG (ref 26.6–33)
MCHC RBC AUTO-ENTMCNC: 31.6 G/DL (ref 31.5–35.7)
MCV RBC AUTO: 92.4 FL (ref 79–97)
MONOCYTES # BLD: 0.36 10*3/MM3 (ref 0.1–0.9)
NEUTROPHILS # BLD AUTO: 2.28 10*3/MM3 (ref 1.7–7)
NEUTROPHILS NFR BLD MANUAL: 19.2 % (ref 42.7–76)
PLAT MORPH BLD: NORMAL
PLATELET # BLD AUTO: 220 10*3/MM3 (ref 140–450)
PMV BLD AUTO: 9.4 FL (ref 6–12)
POTASSIUM SERPL-SCNC: 4.4 MMOL/L (ref 3.5–5.2)
PROT SERPL-MCNC: 6.3 G/DL (ref 6–8.5)
RBC # BLD AUTO: 4.08 10*6/MM3 (ref 3.77–5.28)
RBC MORPH BLD: NORMAL
SMUDGE CELLS BLD QL SMEAR: ABNORMAL
SODIUM SERPL-SCNC: 140 MMOL/L (ref 136–145)
TIBC SERPL-MCNC: 332 MCG/DL (ref 298–536)
TRANSFERRIN SERPL-MCNC: 223 MG/DL (ref 200–360)
VARIANT LYMPHS NFR BLD MANUAL: 2 % (ref 0–5)
VARIANT LYMPHS NFR BLD MANUAL: 74.7 % (ref 19.6–45.3)
WBC NRBC COR # BLD AUTO: 11.87 10*3/MM3 (ref 3.4–10.8)

## 2024-04-11 PROCEDURE — 82784 ASSAY IGA/IGD/IGG/IGM EACH: CPT

## 2024-04-11 PROCEDURE — 36415 COLL VENOUS BLD VENIPUNCTURE: CPT

## 2024-04-11 PROCEDURE — 82728 ASSAY OF FERRITIN: CPT

## 2024-04-11 PROCEDURE — 85007 BL SMEAR W/DIFF WBC COUNT: CPT

## 2024-04-11 PROCEDURE — 80053 COMPREHEN METABOLIC PANEL: CPT

## 2024-04-11 PROCEDURE — 83615 LACTATE (LD) (LDH) ENZYME: CPT

## 2024-04-11 PROCEDURE — 82232 ASSAY OF BETA-2 PROTEIN: CPT

## 2024-04-11 PROCEDURE — 84466 ASSAY OF TRANSFERRIN: CPT

## 2024-04-11 PROCEDURE — 85025 COMPLETE CBC W/AUTO DIFF WBC: CPT

## 2024-04-11 PROCEDURE — 83540 ASSAY OF IRON: CPT

## 2024-04-13 NOTE — PROGRESS NOTES
MGW ONC [facility] GROUP HEMATOLOGY AND ONCOLOGY  [address]  [place] [address] [phone]    Patient Name: Liset Wright  Encounter Date: 04/18/2024  YOB: 1950  Patient Number: [id number]     REASON FOR VISIT:  Liset Wright is a 73-year-old female who returns in follow-up of stage 0 chronic lymphocytic leukemia (B-CLL). She is seen 62.5 months since her initial visit on 1/28/19. She remains in observation. She is here alone.    I have reviewed the HPI and verified with the patient the accuracy of it. No changes to interval history since the information was documented. Jay Cedeno MD 04/18/24      DIAGNOSTIC ABNORMALITIES:   On 11/28/2018, labs showed a WBC of 13.9 with 78% lymphocytes (ALC 10.9), ANC 2.4, and was otherwise normal. Hemoglobin 12, hematocrit 36.7, MCV 87, platelets 261,000. CMP was normal in its entirety to include a BUN of 11, creatinine 0.78, GFR 79, calcium 9.2, total protein 6.8, and normal liver enzymes.  On 01/11/2019, CT of the abdomen and pelvis with IV contrast at [facility] showed no acute pathology in the abdomen or pelvis (no masses or any abnormalities to account for her left upper quadrant pain with no adenopathy and normal spleen).  On 01/16/2019, Ainsley-Barr virus titers showed an IgG level of 239 (0 - 17.9), Ainsley-Barr virus nuclear antigen IgG 118 (0 - 17.9), but IgM of less than 36 and early antigen of less than 9 (indicating prior infection).  On 01/10/2019, repeat CBC showed a hemoglobin of 11.3, hematocrit 35.5, MCV 91.3, platelets 249,000, WBC 13.29.   On 01/14/2019, blood smear (manual) review showed atypical lymphocytosis, moderate smudge cells present. RBC morphology showed normocytic, normochromic anemia without significant morphologic abnormality. Platelets normal morphology.  On 01/17/2019, peripheral blood was sent for flow cytometry ([facility] [employer]). This  revealed chronic lymphocytic leukemia (54% of total cells). Monoclonal B cells have a CLL immunophenotype and are negative for both CD38 and ZAP-70 associated with standard risk disease. PCR for IGVH and p53 mutation and FISH for CLL may provide additional prognostic information. Virtually all of the B cells are monoclonal and express CD19 (dim), CD20 (dim), CD5, CD23, CD11c, and dim surface kappa light chain. They are negative for CD10, CD38, FMC-7, ZAP-70, and surface lambda light chain.   Labs, 01/28/2019: Hemoglobin 13.1, hematocrit 39.5, MCV 90.5, platelets 283,000, WBC 12.2 with 19.3 segs (ANC 2.4), 75.8 lymphocytes (ALC 9.2). CMP normal. GFR 85, calcium 9.9, total protein 7.1, and normal liver enzymes.  (265 - 665). Beta 2 microglobulin 1.3. Serum iron 101, TIBC 333, iron saturation 30%, B12 of 431, folate 15 (each within reference range). Ferritin 23.7 (depressed). HIV screen negative. IgG 742.  Bone marrow biopsy/aspirate, 02/01/2019: PERIPHERAL BLOOD: B cell chronic lymphocytic leukemia. BONE MARROW, UNSPECIFIED SITE, SMEARS, CLOT AND BIOPSY: Involved by B cell chronic lymphocytic leukemia (30%). CLL panel: Positive for a deletion of DLEUI, DILEU2 on chromosome 13 at 04 (58,0% of cells), consistent with CLL. Isolated del 1304.3 is associated with a favorable clinical course. Three of the twenty mitotic cells examined were characterized by loss of one copy of the X chromosome and a deletion in the long arm of chromosome 13.   Stools OB x 3, 02/25/2019. Negative x 3    PREVIOUS INTERVENTIONS:   Observation        Problem List Items Addressed This Visit          Other    CLL (chronic lymphocytic leukemia) - Primary    Relevant Orders    Beta 2 Microglobulin, Serum    CBC & Differential    Comprehensive Metabolic Panel    Lactate Dehydrogenase    Ferritin    Iron Profile           Oncology/Hematology History    No history exists.       PAST MEDICAL HISTORY:  ALLERGIES:  Allergies   Allergen Reactions     Levofloxacin Paresthesia    Phenergan [Promethazine Hcl] Other (See Comments)     Jerky movements    Promethazine Unknown - High Severity    Ramipril Palpitations     CURRENT MEDICATIONS:  Outpatient Encounter Medications as of 4/18/2024   Medication Sig Dispense Refill    albuterol sulfate  (90 Base) MCG/ACT inhaler Inhale 2 puffs Every 4 (Four) Hours As Needed for Wheezing. 18 g 4    dicyclomine (BENTYL) 10 MG capsule Take 1 capsule by mouth 2 (Two) Times a Day.      esomeprazole (nexIUM) 40 MG capsule Take 1 capsule by mouth 2 (Two) Times a Day. (Patient taking differently: Take 1 capsule by mouth Daily.) 60 capsule 11    metoprolol tartrate (LOPRESSOR) 100 MG tablet Take 1 tablet by mouth 3 (Three) Times a Day.      omeprazole (priLOSEC) 40 MG capsule Take 1 capsule by mouth Daily.      valsartan (DIOVAN) 160 MG tablet Take 1 tablet by mouth Daily. for blood pressure       No facility-administered encounter medications on file as of 4/18/2024.     ADULT ILLNESSES:   Chronic lymphocytic leukemia ( ICD-10:C91.10 ;Chronic lymphocytic leukemia of B-cell type not having achieved remission   Abdominal pain ( ICD-10:R10.12 ;Left upper quadrant pain   Asthma ( Dr. Johnson; ICD-10:J45.909 ;Unspecified asthma, uncomplicated )   Cervical degenerative disk disease ( x-ray 11/2017; ICD-10:M50.20 ;Other cervical disc displacement, unspecified cervical region )   Erosive gastritis ( Dr. Kaur; ICD-10:K29.60 ;Other gastritis without bleeding )   Gastroesophageal reflux disease ( GERD, Dr. Kaur; ICD-10:K21.9 ;Gastro-esophageal reflux disease without esophagitis )   Gastroparesis ( ICD-10:K31.84 ;Gastroparesis   Hypertension ( ICD-10:I10 ;Essential (primary) hypertension   Irritable bowel syndrome ( ICD-10:K58.9 ;Irritable bowel syndrome without diarrhea   Migraines ( ICD-10:G43.909 ;Migraine, unspecified, not intractable, without status migrainosus   Normocytic anemia ( ICD-10:D64.9 ;Anemia, unspecified   Palpitations (  "normal heart cath, 02/2003, bilateral ultrasound showed no significant stenosis, 06/2015, stress echo 05/2018 normal; ICD-10:R00.2 ;Palpitations )   Schatzki esophageal ring ( erosive gastritis/gastroesophageal reflux disease, Dr. Kaur; ICD-10:K22.2 ;Esophageal obstruction )  Right hip fracture following a fall, 04/13/2021.  Non-surgical management      SURGERIES:   Cholecystectomy   Cardiac catheterization, 02/2003   Colonoscopy, 2017. \"No polyps.\" 5 years. Dr. Kaur   EGD (upper endoscopy), 2018, normal, Dr. Kaur   Hysterectomy, total      ADULT ILLNESSES:  Patient Active Problem List   Diagnosis Code    Palpitations R00.2    PVCs (premature ventricular contractions) I49.3    Essential hypertension I10    Epigastric pain R10.13    CLL (chronic lymphocytic leukemia) C91.10    Bronchitis J40    Restrictive lung disease J98.4    Encounter for screening for malignant neoplasm of colon Z12.11    Right ventricular dilation I51.7    Chest pain R07.9    Closed fracture of pelvis with routine healing S32.9XXD    Degeneration of lumbar or lumbosacral intervertebral disc M51.37    Non-smoker Z78.9    Body mass index (BMI) of 23.0 to 23.9 in adult Z68.23    Gastroesophageal reflux disease K21.9    Leukemia C95.90    Nausea R11.0    Alpha-1-antitrypsin deficiency carrier Z14.8    Low serum IgG for age R76.8    Polyneuropathy G62.9     SURGERIES:  Past Surgical History:   Procedure Laterality Date    CARDIAC CATHETERIZATION      CHOLECYSTECTOMY      COLONOSCOPY  10/16/2015    normal    COLONOSCOPY N/A 10/19/2020    Procedure: COLONOSCOPY WITH ANESTHESIA;  Surgeon: Grant Kaur DO;  Location: [facility] ENDOSCOPY;  Service: Gastroenterology;  Laterality: N/A;  pre: screening  post: normal  Harry Hastings MD    ENDOSCOPY N/A 11/23/2016    normal    ENDOSCOPY N/A 12/11/2018    Procedure: ESOPHAGOGASTRODUODENOSCOPY WITH ANESTHESIA;  Surgeon: Grant Kaur DO;  Location: [facility] ENDOSCOPY;  Service: Gastroenterology    " ENDOSCOPY N/A 11/25/2020    Procedure: ESOPHAGOGASTRODUODENOSCOPY WITH ANESTHESIA;  Surgeon: Grant Kaur DO;  Location: [facility] ENDOSCOPY;  Service: Gastroenterology;  Laterality: N/A;  preop; chest pain  postop; normal   PCP Harry Hastings     ENDOSCOPY N/A 11/11/2022    Procedure: ESOPHAGOGASTRODUODENOSCOPY WITH ANESTHESIA;  Surgeon: Grant Kaur DO;  Location: [facility] ENDOSCOPY;  Service: Gastroenterology;  Laterality: N/A;  Pre: Nausea  Post:normal  Harry Hastings MD    HYSTERECTOMY       HEALTH MAINTENANCE ITEMS:  Health Maintenance Due   Topic Date Due    TDAP/TD VACCINES (1 - Tdap) Never done    ZOSTER VACCINE (1 of 2) Never done    RSV Vaccine - Adults (1 - 1-dose 60+ series) Never done    HEPATITIS C SCREENING  Never done    DXA SCAN  01/14/2023    COVID-19 Vaccine (7 - 2023-24 season) 03/07/2024       <no information>  Last Completed Colonoscopy            COLORECTAL CANCER SCREENING (COLONOSCOPY - Every 5 Years) Next due on 10/19/2025      10/19/2020  Surgical Procedure: COLONOSCOPY    10/19/2020  COLONOSCOPY    02/25/2019  Occult Blood X 3, Stool - Stool, Per Rectum    10/16/2015  SCANNED - COLONOSCOPY    06/17/2011  SCANNED - COLONOSCOPY    Only the first 5 history entries have been loaded, but more history exists.                  Immunization History   Administered Date(s) Administered    COVID-19 (PFIZER) BIVALENT 12+YRS 02/08/2023    COVID-19 (PFIZER) Purple Cap Monovalent 01/15/2021, 02/05/2021, 08/26/2021    COVID-19 F23 (PFIZER) 12YRS+ (COMIRNATY) 01/11/2024    Covid-19 (Pfizer) Gray Cap Monovalent 05/19/2022    FLUAD TRI 65YR+ 10/06/2017, 10/09/2018    Fluzone (or Fluarix & Flulaval for VFC) >6mos 11/01/2019    Fluzone High Dose =>65 Years (Vaxcare ONLY) 11/11/2021    Fluzone Quad >6mos (Multi-dose) 10/01/2018    Hep B, Unspecified 01/10/2002, 02/15/2002, 05/16/2002    Influenza Quad Vaccine (Inpatient) 10/01/2018    Influenza, Unspecified 11/02/2020    Pneumococcal Conjugate 13-Valent  "(PCV13) 10/06/2017    Pneumococcal Polysaccharide (PPSV23) 10/01/2018     Last Completed Mammogram            MAMMOGRAM (Every 2 Years) Next due on 1/19/2025 01/19/2023  Outside Claim: HC MAMMOGRAM SCREENING BILAT DIGITAL W CAD    01/19/2023  Outside Claim: CHG SCREENING DIGITAL BREAST TOMOSYNTHESIS BI    01/17/2022  Outside Claim: HC MAMMOGRAM SCREENING BILAT DIGITAL W CAD    01/17/2022  Outside Claim: CHG SCREENING DIGITAL BREAST TOMOSYNTHESIS BI    01/14/2021  Outside Claim: HC MAMMOGRAM SCREENING BILAT DIGITAL W CAD    Only the first 5 history entries have been loaded, but more history exists.                      FAMILY HISTORY:  Family History   Problem Relation Age of Onset    Cancer Mother     Cancer Father         lung    Cancer Paternal Grandmother         stomach    No Known Problems Brother     No Known Problems Maternal Aunt     No Known Problems Maternal Uncle     No Known Problems Paternal Aunt     No Known Problems Paternal Uncle     No Known Problems Maternal Grandmother     No Known Problems Maternal Grandfather     No Known Problems Paternal Grandfather     No Known Problems Other     Colon cancer Neg Hx     Esophageal cancer Neg Hx     Asthma Neg Hx     Diabetes Neg Hx     Emphysema Neg Hx     Heart failure Neg Hx     Hypertension Neg Hx     Colon polyps Neg Hx      SOCIAL HISTORY:  Social History     Socioeconomic History    Marital status:    Tobacco Use    Smoking status: Never     Passive exposure: Current    Smokeless tobacco: Never   Vaping Use    Vaping status: Never Used   Substance and Sexual Activity    Alcohol use: No    Drug use: No    Sexual activity: Defer       REVIEW OF SYSTEMS:  Constitutional:   The patient's appetite is good, \"good.\"  Her energy is variable, \"same some days better than others but been good overall.\" She manages her ADLs including chores, errands.  She still drives.  She has lost 2 lb (had gained 4 lb at her prior visit) since her last visit.  She " "has no fevers, chills, or drenching night sweats.  Her sleep habits seem appropriate.  Ear/Nose/Throat/Mouth:   She reports no ear pains, sinus symptoms, sore throat, nosebleeds, or sore tongue. She has migraine headaches. She denies any hoarseness.   Ocular:   She reports no eye pain, significant change in visual acuity, double vision, or blurry vision.  Respiratory:   Says she is prone to respiratory infections due to alpha 1 antitrypsin deficiency.  No lung infections since last visit, 10/2023 and 01/2024.  Says that through it all her home O2 sats run 97-99% on RA.  No cough.  She has some exertional dyspnea from possible asthma but no chronic cough, significant shortness of breathing at rest or unexplained chest wall pain. Says prior PFTs suggest \"asthma\".  Is followed by [facility]  Cardiovascular:   She reports no exertional chest pain, chest pressure, or chest heaviness. She reports no claudication. She reports occasional palpitations but denies symptomatic orthostasis.  Gastrointestinal:   She reports no dysphagia, nausea, vomiting, postprandial abdominal pain, bloating, cramping, change in bowel habits, with dark (OTC iron) discoloration of the stool. She reports no rectal bleeding. She reports occasional but chronic constipation requiring stool softeners, lifelong. She has no diarrhea.  Genitourinary:   She reports no urinary burning, frequency, dribbling, or discoloration. She reports no difficulty controlling her bladder. She has no need to urinate frequently through the night.   Musculoskeletal:          She reports no unexplained arthralgias, myalgias, or nighttime leg cramping.  Says she sustained a right hip fracture following a fall, 04/13/2021.  Non-surgical management.  Has only intermittent residual pains, \"sometimes.\".  Extremities:   She reports no trouble with fluid retention or significant leg swelling.  Endocrine:   She reports no problems with excess thirst, excessive urination, " "vasomotor instability, or unexplained fatigue.  Heme/Lymphatic:   She reports easy bruising but no unexplained bleeding, petechial rashes, or swollen glands.  Skin:   She reports no itching, rashes, or lesions which won't heal.  Neuro:   She reports no loss of consciousness, seizures, fainting spells, or dizziness. She reports no weakness of face, arms, or legs. She has no difficulty with speech. She has no tremors. She admits to occasional paresthesias of the hands.   Psych:   She seems generally satisfied with life. She denies depression. She reports no mood swings.       VITAL SIGNS: /88   Pulse 53   Temp 97.3 °F (36.3 °C) (Temporal)   Resp 18   Ht 165.1 cm (65\")   Wt 63.3 kg (139 lb 8 oz)   LMP  (LMP Unknown)   SpO2 98%   BMI 23.21 kg/m² Body surface area is 1.7 meters squared.  Pain Score    04/18/24 0955   PainSc: 0-No pain       I have reexamined the patient and the results are consistent with the previously documented exam. Jay Cedeno MD        PHYSICAL EXAMINATION:   General:   She is a pleasant, cooperative, slender but otherwise well-developed, well-nourished, and modestly-kept elderly female who is comfortable at rest. She arrived in the exam room ambulatory. She appears to be her stated age. Her skin color is normal. ECOG 0  Head/Neck:   The patient is anicteric and atraumatic. The trachea is midline. The neck is supple without evidence of jugular venous distention or cervical adenopathy. Prominent, non-tender right sternoclavicular joint.  Eyes:   The pupils are equal, round, and reactive to light. The extraocular movements are full. There is no scleral jaundice or erythema.   Chest:   The respiratory efforts are normal and unhindered. There are soft scattered wheezes, but no rales, rhonchi or asymmetry of breath sounds.  Cardiovascular:   The patient has a regular cardiac rate and rhythm without murmurs, rubs, or gallops. The peripheral pulses are equal and full.  Abdomen:   The " belly is soft and flat. There is no rebound or guarding. There is no organomegaly, mass-effect, or tenderness. Bowel sounds are active and of normal character.  Extremities:   There is no evidence of cyanosis, clubbing, or edema.  Rheumatologic:   There is no overt evidence of rheumatoid deformities of the hands. There is no sausaging of the fingers. There is no sign of active synovitis. The gait is normal.  Cutaneous:   There are no overt rashes, disseminated lesions, purpura, or petechiae.   Lymphatics:   There is no evidence of adenopathy in the cervical, supraclavicular, axillary, inguinal, or femoral areas. There is no splenomegaly.  Neurologic:   The patient is alert, oriented, cooperative, and pleasant. She is appropriately conversant. She ambulated into the exam room without assistance and transferred from chair to exam table unaided. There is no overt dysfunction of the motor, sensory, cerebellar systems.  Psych:   Mood and affect are appropriate for circumstance. Eye contact is appropriate. Normal judgement and decision making.         LABS    Lab Results - Last 18 Months   Lab Units 04/11/24  0759 12/12/23  0914 10/11/23  0849 07/21/23  0920 01/16/23  0810 12/27/22  1009   HEMOGLOBIN g/dL 11.9* 11.6* 11.4* 11.6* 11.1* 12.3   HEMATOCRIT % 37.7 36.5 36.3 36.9 35.7 39.1   MCV fL 92.4 92.6 93.1 94.1 97.3* 98.2   WBC 10*3/mm3 11.87* 14.63* 15.25* 12.26* 17.80* 19.1*   RDW % 12.8 13.2 13.0 13.1 14.4 14.8*   MPV fL 9.4 9.6 9.3 9.1 9.2 9.2*   PLATELETS 10*3/mm3 220 221 259 315 241 275   NEUTROS ABS 10*3/mm3 2.28 3.80 2.93 2.79 6.00 3.2   LYMPHS ABS 10*3/mm3  --   --   --  8.89*  --  15.1*   MONOS ABS 10*3/mm3  --   --   --  0.42  --  0.40   EOS ABS 10*3/mm3 0.12 0.29  --  0.08 0.18 0.19   BASOS ABS 10*3/mm3  --  0.15  --  0.05  --  0.20   IMMATURE GRANS (ABS) K/uL  --   --   --   --   --  0.1   NEUTROPHIL % % 19.2* 26.0* 19.2*  --  33.7*  --    MONOCYTES % % 3.0* 3.0* 1.0*  --  1.0*  --    BASOPHIL % %  --  1.0   --   --   --   --    ATYP LYMPH % % 2.0 8.0*  --   --  10.2*  --    ANISOCYTOSIS   --  Slight/1+  --   --   --   --        Lab Results - Last 18 Months   Lab Units 24  0759 23  0914 10/11/23  0849 23  0920   GLUCOSE mg/dL 109* 132* 113* 108*   SODIUM mmol/L 140 139 141 138   POTASSIUM mmol/L 4.4 4.4 4.2 4.3   CO2 mmol/L 30.0* 31.0* 28.0 29.0   CHLORIDE mmol/L 103 100 104 101   ANION GAP mmol/L 7.0 8.0 9.0 8.0   CREATININE mg/dL 0.76 0.80 0.68 0.69   BUN mg/dL 13 10 16 15   BUN / CREAT RATIO  17.1 12.5 23.5 21.7   CALCIUM mg/dL 9.2 9.0 9.3 9.2   ALK PHOS U/L 84 88 105 105   TOTAL PROTEIN g/dL 6.3 6.2 6.5 6.2   ALT (SGPT) U/L 10 11 11 9   AST (SGOT) U/L 18 17 16 14   BILIRUBIN mg/dL 0.6 0.7 0.6 0.4   ALBUMIN g/dL 4.3 4.1 4.3 4.0   GLOBULIN gm/dL 2.0 2.1 2.2 2.2       Lab Results - Last 18 Months   Lab Units 24  0759 23  0914 10/11/23  0849 23  0920 23  0810   LDH U/L 206 221* 224* 197 213       Lab Results - Last 18 Months   Lab Units 24  0759 23  0914 10/11/23  0849 23  0810   IRON mcg/dL 103 114 78 78   TIBC mcg/dL 332 320 308 332   IRON SATURATION (TSAT) % 31 36 25 23   FERRITIN ng/mL 109.20 138.40 136.80 111.90       ASSESSMENT:   1. B cell chronic lymphocytic leukemia (B-CLL):   Stage: 0   Tumor [place]: Lymphocytosis.   Complications of Tumor: None.   Tumor Status: Untreated.   IVGH: Pending.   CD38: Negative.   ZAP-70: Negative.   Isolated del 13q14.3 (favorable)   LDH: 206, 24 (prior: 197 - 599)   B2M: 1.9, 24 (prior: 1.3 - 2.1)   Ig, 24 (prior: 517 - 742)  24- WBC 11.87; ALC 9.1  (range: WBC 13.0-23.99; ALC 8.64-15.25)  2. Normocytic anemia.    --Repleted ferritin -138, 2022 (from 136; 111; 184; 133; 116; 159, 145.4, 131.5; 53.2; 35; 23).   --Hgb 11.9; MCV 92.4, 24 (prior: Hgb 11.3 - 13.3; MCV 87 - 98.2)   3. Irritable bowel syndrome. On Bentyl  4. Gastroparesis. On omeprazole  5. Migraines. On Excedrin  migraines  6. Cervical degenerative disk disease.  No meds        7.  Alpha 1 antitrypsin deficiency. Dr. Johnson.  Now followed by Dr. Vasquez        8.  Palpitations with echo, 07/24/2020 showing grade II diastolic dysfunction but EF > 70%.  Dr. Hawk follows        9.  Prominent right sterno-clavicular joint. Asymptomatic         --09/02/2020-right sternoclavicular joint x-ray.  Mild arthritic changes of the inferior proximal right clavicle adjacent to the sternoclavicular joint with appropriate alignment.         10. Received COVID # 4 vaccination, 05/2022       11. Recurrent pulmonary infections.  Followed by pulmonary in [facility]- Dr. Russell  --1/24/2023- Visit with Dr. VasquezRdtttxlv-bucown-ru on recurrent respiratory infections and immunoglobulin G deficiency. She reports that recently she had again symptoms of upper respiratory tract infection. Her CT of the chest showed areas of groundglass opacities that are scattered and calcified granulomas.  The patient's recurrent upper respiratory infections symptoms could be related to common variable immune deficiency secondary to low IgG versus immune deficiency secondary to CLL or both. She might benefit from a trial of IVIG. We will proceed with IVIG.  --7/25/23- CT chest- Multiple nodular densities within the lungs bilaterally which have developed in the interval.  PET imaging recommended. Unexpected findings.  Multiple new pulmonary masses.   --9/25/23 - CT chest.  Stable nonaggressive nodules.  Improving bronchiolitis.  Remote granulomatous disease.  No new abnormality.  --10/23/23- follow-up Dr. Vasquez.  A/P:  Lung nodules- CT CHEST WO CONTRAST; Future.  Alpha-1-antitrypsin deficiency carrier.  CVID (common variable immunodeficiency) ([facility]).  Pneumonia of right middle lobe due to infectious organism.  CLL (chronic lymphocytic leukemia) ([facility]).  OHARA (dyspnea on exertion).  We reviewed the CT images with the patient. There is definite clearing of the prior  infiltrate. She does have low IgG level. She also has CLL which predisposes her to recurrent infections. We discussed starting IVIG. I will discuss this with Dr. Dumont her hematologist at [facility].  We will repeat CT of the chest in 6 months to assure stability of her nodules.   --11/2/23- Consult with immunology, Dr. Simon, 11/22/23-A/P: Recurrent infections-likely borderline IgGs due to abnormal B cells.  Do not suspect common variable hypogammaglobulinemia.  At this time I do not feel she would benefit from immunoglobulin replacement.  That might change if she has documented recurrent pneumonitis with infiltrates by chest x-ray (reports pending) and strong evidence of lack of immune function (vaccine response) and has a low IgA/or IgM.  In the past she has received antibiotics and steroids.  Would definitely avoid the latter if at all possible.  It may be she has higher than average need for antibiotics, however documented bacterial infection by x-rays or culture or highly suspected by labs given her diagnosis of CLL.  Draw labs (diphtheria IgG antibody, IgA, IgG, IgM, lymphocyte subset, pneumococcal IgG 23 serotypes.  Tetanus IgG antibody.  Follow-up 5-6 weeks.  --4/9/24- Follow-up Dr. Vasquez-ASSESSMENT/PLAN: 1. Lung nodules - CT CHEST WO CONTRAST; Future  2. Alpha-1-antitrypsin deficiency carrier; 3. CVID (common variable immunodeficiency) ([facility]); 4. Pneumonia of right middle lobe due to infectious organism; 5. CLL (chronic lymphocytic leukemia) ([facility]). Will repeat CT of the chest in one year and follow up at that time. She is doing well. Not IVIG candidate per Dr. Cedeno.       RECOMMENDATIONS:   1.   Apprised of labs from 4/11/24, with normal CMP, stable leukocytosis/lymphocytosis, normal ANC, stable anemia, normal platelets, repleted iron levels, normal (1.9) B2M,  (prior peak: 299), adequate IgG 640 (prior: 517-742).    2.   Again review consult, 11/22/23 from Dr. Simon of  "immunology.  A/P;  Does not feel IVIG warranted (see above).  3.  Reviewed follow-up Dr. Vasquez, 4/9/24-(above). CT chest 1 year  4.  Previously discussed the bone marrow biopsy, 02/01 (above). Confirms B-CLL with del 13q14 (favorable)   5.  B- CLL again previously discussed. I had explained that standard therapy has not appreciably altered the nature history of CLL and survival curves demonstrate that CLL is an incurable disease. Randomized studies have failed to show a survival advantage of immediate treatment over delayed treatment for asymptomatic patients with early stage disease. As a result, treatment is generally reserved until the patient has active disease defined as the presence of disease-related symptoms: Weight loss greater than 10%; extreme fatigue; fever greater than 100.5 for 2 weeks with night sweats without evidence of infection (\"B\" symptoms); worsening cytopenias (HGB less than 11; platelets less than 100,000); unexplained recurrent infections; worsening adenopathy, or splenomegaly. She is aware to call should she develop any of these symptoms.      6.  Return to the [place] office in 24 weeks with pre-office serum iron, Fe sat, ferritin, CBC and differential, IgG, LDH, B2M.      I spent ~32 minutes caring for Liset on this date of service. This time includes time spent by me in the following activities: preparing for the visit, reviewing tests, performing a medically appropriate examination and/or evaluation, counseling and educating the patient/family/caregiver, ordering medications, tests, or procedures and documenting information in the medical record.       cc: Harry Hastings MD     "

## 2024-04-18 ENCOUNTER — OFFICE VISIT (OUTPATIENT)
Dept: ONCOLOGY | Facility: CLINIC | Age: 74
End: 2024-04-18
Payer: MEDICARE

## 2024-04-18 VITALS
DIASTOLIC BLOOD PRESSURE: 88 MMHG | HEART RATE: 53 BPM | WEIGHT: 139.5 LBS | OXYGEN SATURATION: 98 % | TEMPERATURE: 97.3 F | HEIGHT: 65 IN | BODY MASS INDEX: 23.24 KG/M2 | SYSTOLIC BLOOD PRESSURE: 146 MMHG | RESPIRATION RATE: 18 BRPM

## 2024-04-18 DIAGNOSIS — C91.10 CLL (CHRONIC LYMPHOCYTIC LEUKEMIA): Primary | ICD-10-CM

## 2024-08-16 ENCOUNTER — OFFICE VISIT (OUTPATIENT)
Age: 74
End: 2024-08-16
Payer: MEDICARE

## 2024-08-16 VITALS
OXYGEN SATURATION: 98 % | HEART RATE: 55 BPM | BODY MASS INDEX: 23.66 KG/M2 | HEIGHT: 65 IN | DIASTOLIC BLOOD PRESSURE: 84 MMHG | SYSTOLIC BLOOD PRESSURE: 166 MMHG | WEIGHT: 142 LBS

## 2024-08-16 DIAGNOSIS — L60.0 INGROWN NAIL OF GREAT TOE OF LEFT FOOT: ICD-10-CM

## 2024-08-16 DIAGNOSIS — M79.675 TOE PAIN, BILATERAL: Primary | ICD-10-CM

## 2024-08-16 DIAGNOSIS — L60.0 INGROWN NAIL OF GREAT TOE OF RIGHT FOOT: ICD-10-CM

## 2024-08-16 DIAGNOSIS — M79.674 TOE PAIN, BILATERAL: Primary | ICD-10-CM

## 2024-10-10 ENCOUNTER — LAB (OUTPATIENT)
Dept: LAB | Facility: HOSPITAL | Age: 74
End: 2024-10-10
Payer: MEDICARE

## 2024-10-10 ENCOUNTER — TELEPHONE (OUTPATIENT)
Dept: ONCOLOGY | Facility: CLINIC | Age: 74
End: 2024-10-10
Payer: MEDICARE

## 2024-10-10 DIAGNOSIS — R79.89 LOW SERUM SODIUM: ICD-10-CM

## 2024-10-10 DIAGNOSIS — R79.89 LOW SERUM SODIUM: Primary | ICD-10-CM

## 2024-10-10 DIAGNOSIS — C91.10 CLL (CHRONIC LYMPHOCYTIC LEUKEMIA): ICD-10-CM

## 2024-10-10 LAB
ALBUMIN SERPL-MCNC: 4.1 G/DL (ref 3.5–5.2)
ALBUMIN/GLOB SERPL: 1.4 G/DL
ALP SERPL-CCNC: 133 U/L (ref 39–117)
ALT SERPL W P-5'-P-CCNC: 17 U/L (ref 1–33)
ANION GAP SERPL CALCULATED.3IONS-SCNC: 10 MMOL/L (ref 5–15)
ANION GAP SERPL CALCULATED.3IONS-SCNC: 8 MMOL/L (ref 5–15)
ANISOCYTOSIS BLD QL: ABNORMAL
AST SERPL-CCNC: 24 U/L (ref 1–32)
B2 MICROGLOB SERPL-MCNC: 1.8 MG/L (ref 0.8–2.2)
BASOPHILS # BLD MANUAL: 0 10*3/MM3 (ref 0–0.2)
BASOPHILS NFR BLD MANUAL: 0 % (ref 0–1.5)
BILIRUB SERPL-MCNC: 0.7 MG/DL (ref 0–1.2)
BUN SERPL-MCNC: 13 MG/DL (ref 8–23)
BUN SERPL-MCNC: 13 MG/DL (ref 8–23)
BUN/CREAT SERPL: 17.1 (ref 7–25)
BUN/CREAT SERPL: 18.3 (ref 7–25)
CALCIUM SPEC-SCNC: 9.4 MG/DL (ref 8.6–10.5)
CALCIUM SPEC-SCNC: 9.4 MG/DL (ref 8.6–10.5)
CHLORIDE SERPL-SCNC: 91 MMOL/L (ref 98–107)
CHLORIDE SERPL-SCNC: 93 MMOL/L (ref 98–107)
CO2 SERPL-SCNC: 29 MMOL/L (ref 22–29)
CO2 SERPL-SCNC: 32 MMOL/L (ref 22–29)
CREAT SERPL-MCNC: 0.71 MG/DL (ref 0.57–1)
CREAT SERPL-MCNC: 0.76 MG/DL (ref 0.57–1)
DEPRECATED RDW RBC AUTO: 41.9 FL (ref 37–54)
EGFRCR SERPLBLD CKD-EPI 2021: 83.4 ML/MIN/1.73
EGFRCR SERPLBLD CKD-EPI 2021: 90.5 ML/MIN/1.73
EOSINOPHIL # BLD MANUAL: 0.17 10*3/MM3 (ref 0–0.4)
EOSINOPHIL NFR BLD MANUAL: 1 % (ref 0.3–6.2)
ERYTHROCYTE [DISTWIDTH] IN BLOOD BY AUTOMATED COUNT: 12.6 % (ref 12.3–15.4)
FERRITIN SERPL-MCNC: 237.9 NG/ML (ref 13–150)
GLOBULIN UR ELPH-MCNC: 2.9 GM/DL
GLUCOSE SERPL-MCNC: 106 MG/DL (ref 65–99)
GLUCOSE SERPL-MCNC: 131 MG/DL (ref 65–99)
HCT VFR BLD AUTO: 36.7 % (ref 34–46.6)
HGB BLD-MCNC: 12 G/DL (ref 12–15.9)
HYPOCHROMIA BLD QL: ABNORMAL
IRON 24H UR-MRATE: 41 MCG/DL (ref 37–145)
IRON SATN MFR SERPL: 13 % (ref 20–50)
LDH SERPL-CCNC: 218 U/L (ref 135–214)
LYMPHOCYTES # BLD MANUAL: 10.7 10*3/MM3 (ref 0.7–3.1)
LYMPHOCYTES NFR BLD MANUAL: 4 % (ref 5–12)
MCH RBC QN AUTO: 29.9 PG (ref 26.6–33)
MCHC RBC AUTO-ENTMCNC: 32.7 G/DL (ref 31.5–35.7)
MCV RBC AUTO: 91.5 FL (ref 79–97)
MONOCYTES # BLD: 0.68 10*3/MM3 (ref 0.1–0.9)
NEUTROPHILS # BLD AUTO: 5.53 10*3/MM3 (ref 1.7–7)
NEUTROPHILS NFR BLD MANUAL: 29.3 % (ref 42.7–76)
NEUTS BAND NFR BLD MANUAL: 3 % (ref 0–5)
PLAT MORPH BLD: NORMAL
PLATELET # BLD AUTO: 287 10*3/MM3 (ref 140–450)
PMV BLD AUTO: 8.8 FL (ref 6–12)
POIKILOCYTOSIS BLD QL SMEAR: ABNORMAL
POTASSIUM SERPL-SCNC: 3.6 MMOL/L (ref 3.5–5.2)
POTASSIUM SERPL-SCNC: 3.7 MMOL/L (ref 3.5–5.2)
PROT SERPL-MCNC: 7 G/DL (ref 6–8.5)
RBC # BLD AUTO: 4.01 10*6/MM3 (ref 3.77–5.28)
SODIUM SERPL-SCNC: 130 MMOL/L (ref 136–145)
SODIUM SERPL-SCNC: 133 MMOL/L (ref 136–145)
STOMATOCYTES BLD QL SMEAR: ABNORMAL
TIBC SERPL-MCNC: 328 MCG/DL (ref 298–536)
TRANSFERRIN SERPL-MCNC: 220 MG/DL (ref 200–360)
VARIANT LYMPHS NFR BLD MANUAL: 4 % (ref 0–5)
VARIANT LYMPHS NFR BLD MANUAL: 58.6 % (ref 19.6–45.3)
WBC MORPH BLD: NORMAL
WBC NRBC COR # BLD AUTO: 17.1 10*3/MM3 (ref 3.4–10.8)

## 2024-10-10 PROCEDURE — 85025 COMPLETE CBC W/AUTO DIFF WBC: CPT

## 2024-10-10 PROCEDURE — 83615 LACTATE (LD) (LDH) ENZYME: CPT

## 2024-10-10 PROCEDURE — 82232 ASSAY OF BETA-2 PROTEIN: CPT

## 2024-10-10 PROCEDURE — 36415 COLL VENOUS BLD VENIPUNCTURE: CPT

## 2024-10-10 PROCEDURE — 82728 ASSAY OF FERRITIN: CPT

## 2024-10-10 PROCEDURE — 83540 ASSAY OF IRON: CPT

## 2024-10-10 PROCEDURE — 84466 ASSAY OF TRANSFERRIN: CPT

## 2024-10-10 PROCEDURE — 85007 BL SMEAR W/DIFF WBC COUNT: CPT

## 2024-10-10 PROCEDURE — 80053 COMPREHEN METABOLIC PANEL: CPT

## 2024-10-10 NOTE — TELEPHONE ENCOUNTER
----- Message from Jay Cedeno sent at 10/10/2024  9:50 AM CDT -----  Sodium 130-repeat BMP  ----- Message -----  From: Lab, Background User  Sent: 10/10/2024   9:34 AM CDT  To: Jay Cedeno MD

## 2024-10-10 NOTE — PROGRESS NOTES
[facility] ON[facility] GROUP HEMATOLOGY AND ONCOLOGY  [address]  [place] [address] [phone]    Patient Name: Liset Wright  Encounter Date: 10/17/2024  YOB: 1950  Patient Number: [id number]     REASON FOR VISIT:  Liset Wright is a 74-year-old female who returns in follow-up of stage 0 chronic lymphocytic leukemia (B-CLL). She is seen 68.5 months since her initial visit on 1/28/19. She remains in observation. She is here alone.    I have reviewed the HPI and verified with the patient the accuracy of it. No changes to interval history since the information was documented. Jay Cedeno MD 10/17/24      DIAGNOSTIC ABNORMALITIES:   On 11/28/2018, labs showed a WBC of 13.9 with 78% lymphocytes (ALC 10.9), ANC 2.4, and was otherwise normal. Hemoglobin 12, hematocrit 36.7, MCV 87, platelets 261,000. CMP was normal in its entirety to include a BUN of 11, creatinine 0.78, GFR 79, calcium 9.2, total protein 6.8, and normal liver enzymes.  On 01/11/2019, CT of the abdomen and pelvis with IV contrast at [facility] showed no acute pathology in the abdomen or pelvis (no masses or any abnormalities to account for her left upper quadrant pain with no adenopathy and normal spleen).  On 01/16/2019, Ainsley-Barr virus titers showed an IgG level of 239 (0 - 17.9), Ainsley-Barr virus nuclear antigen IgG 118 (0 - 17.9), but IgM of less than 36 and early antigen of less than 9 (indicating prior infection).  On 01/10/2019, repeat CBC showed a hemoglobin of 11.3, hematocrit 35.5, MCV 91.3, platelets 249,000, WBC 13.29.   On 01/14/2019, blood smear (manual) review showed atypical lymphocytosis, moderate smudge cells present. RBC morphology showed normocytic, normochromic anemia without significant morphologic abnormality. Platelets normal morphology.  On 01/17/2019, peripheral blood was sent for flow cytometry ([facility]). This  revealed chronic lymphocytic leukemia (54% of total cells). Monoclonal B cells have a CLL immunophenotype and are negative for both CD38 and ZAP-70 associated with standard risk disease. PCR for IGVH and p53 mutation and FISH for CLL may provide additional prognostic information. Virtually all of the B cells are monoclonal and express CD19 (dim), CD20 (dim), CD5, CD23, CD11c, and dim surface kappa light chain. They are negative for CD10, CD38, FMC-7, ZAP-70, and surface lambda light chain.   Labs, 01/28/2019: Hemoglobin 13.1, hematocrit 39.5, MCV 90.5, platelets 283,000, WBC 12.2 with 19.3 segs (ANC 2.4), 75.8 lymphocytes (ALC 9.2). CMP normal. GFR 85, calcium 9.9, total protein 7.1, and normal liver enzymes.  (265 - 665). Beta 2 microglobulin 1.3. Serum iron 101, TIBC 333, iron saturation 30%, B12 of 431, folate 15 (each within reference range). Ferritin 23.7 (depressed). HIV screen negative. IgG 742.  Bone marrow biopsy/aspirate, 02/01/2019: PERIPHERAL BLOOD: B cell chronic lymphocytic leukemia. BONE MARROW, UNSPECIFIED SITE, SMEARS, CLOT AND BIOPSY: Involved by B cell chronic lymphocytic leukemia (30%). CLL panel: Positive for a deletion of DLEUI, DILEU2 on chromosome 13 at 04 (58,0% of cells), consistent with CLL. Isolated del 1304.3 is associated with a favorable clinical course. Three of the twenty mitotic cells examined were characterized by loss of one copy of the X chromosome and a deletion in the long arm of chromosome 13.   Stools OB x 3, 02/25/2019. Negative x 3    PREVIOUS INTERVENTIONS:   Observation        Problem List Items Addressed This Visit    None            Oncology/Hematology History    No history exists.       PAST MEDICAL HISTORY:  ALLERGIES:  Allergies   Allergen Reactions    Levofloxacin Paresthesia    Phenergan [Promethazine Hcl] Other (See Comments)     Jerky movements    Promethazine Unknown - High Severity    Ramipril Palpitations     CURRENT MEDICATIONS:  Outpatient Encounter  Medications as of 10/17/2024   Medication Sig Dispense Refill    albuterol sulfate  (90 Base) MCG/ACT inhaler Inhale 2 puffs Every 4 (Four) Hours As Needed for Wheezing. 18 g 4    amLODIPine (NORVASC) 10 MG tablet Take 1 tablet by mouth Daily.      dicyclomine (BENTYL) 10 MG capsule Take 1 capsule by mouth 2 (Two) Times a Day.      losartan (COZAAR) 50 MG tablet Take 2 tablets by mouth Daily.      metoprolol tartrate (LOPRESSOR) 100 MG tablet Take 1 tablet by mouth 3 (Three) Times a Day.      omeprazole (priLOSEC) 40 MG capsule Take 1 capsule by mouth Daily.      [DISCONTINUED] esomeprazole (nexIUM) 40 MG capsule Take 1 capsule by mouth 2 (Two) Times a Day. (Patient not taking: Reported on 10/17/2024) 60 capsule 11    [DISCONTINUED] valsartan (DIOVAN) 160 MG tablet Take 1 tablet by mouth Daily. for blood pressure (Patient not taking: Reported on 10/17/2024)       No facility-administered encounter medications on file as of 10/17/2024.     ADULT ILLNESSES:   Chronic lymphocytic leukemia ( ICD-10:C91.10 ;Chronic lymphocytic leukemia of B-cell type not having achieved remission   Abdominal pain ( ICD-10:R10.12 ;Left upper quadrant pain   Asthma ( Dr. Johnson; ICD-10:J45.909 ;Unspecified asthma, uncomplicated )   Cervical degenerative disk disease ( x-ray 11/2017; ICD-10:M50.20 ;Other cervical disc displacement, unspecified cervical region )   Erosive gastritis ( Dr. Kaur; ICD-10:K29.60 ;Other gastritis without bleeding )   Gastroesophageal reflux disease ( GERD, Dr. Kaur; ICD-10:K21.9 ;Gastro-esophageal reflux disease without esophagitis )   Gastroparesis ( ICD-10:K31.84 ;Gastroparesis   Hypertension ( ICD-10:I10 ;Essential (primary) hypertension   Irritable bowel syndrome ( ICD-10:K58.9 ;Irritable bowel syndrome without diarrhea   Migraines ( ICD-10:G43.909 ;Migraine, unspecified, not intractable, without status migrainosus   Normocytic anemia ( ICD-10:D64.9 ;Anemia, unspecified   Palpitations ( normal heart  "cath, 02/2003, bilateral ultrasound showed no significant stenosis, 06/2015, stress echo 05/2018 normal; ICD-10:R00.2 ;Palpitations )   Schatzki esophageal ring ( erosive gastritis/gastroesophageal reflux disease, Dr. Kaur; ICD-10:K22.2 ;Esophageal obstruction )  Right hip fracture following a fall, 04/13/2021.  Non-surgical management      SURGERIES:   Cholecystectomy   Cardiac catheterization, 02/2003   Colonoscopy, 2017. \"No polyps.\" 5 years. Dr. Kaur   EGD (upper endoscopy), 2018, normal, Dr. Kaur   Hysterectomy, total      ADULT ILLNESSES:  Patient Active Problem List   Diagnosis Code    Palpitations R00.2    PVCs (premature ventricular contractions) I49.3    Essential hypertension I10    Epigastric pain R10.13    CLL (chronic lymphocytic leukemia) C91.10    Bronchitis J40    Restrictive lung disease J98.4    Encounter for screening for malignant neoplasm of colon Z12.11    Right ventricular dilation I51.7    Chest pain R07.9    Closed fracture of pelvis with routine healing S32.9XXD    Degeneration of lumbar or lumbosacral intervertebral disc M51.379    Non-smoker Z78.9    Body mass index (BMI) of 23.0 to 23.9 in adult Z68.23    Gastroesophageal reflux disease K21.9    Leukemia C95.90    Nausea R11.0    Alpha-1-antitrypsin deficiency carrier Z14.8    Low serum IgG for age R76.8    Polyneuropathy G62.9     SURGERIES:  Past Surgical History:   Procedure Laterality Date    CARDIAC CATHETERIZATION      CHOLECYSTECTOMY      COLONOSCOPY  10/16/2015    normal    COLONOSCOPY N/A 10/19/2020    Procedure: COLONOSCOPY WITH ANESTHESIA;  Surgeon: Grant Kaur DO;  Location: [facility] ENDOSCOPY;  Service: Gastroenterology;  Laterality: N/A;  pre: screening  post: normal  Harry Hastings MD    ENDOSCOPY N/A 11/23/2016    normal    ENDOSCOPY N/A 12/11/2018    Procedure: ESOPHAGOGASTRODUODENOSCOPY WITH ANESTHESIA;  Surgeon: Grant Kaur DO;  Location: [facility] ENDOSCOPY;  Service: Gastroenterology    ENDOSCOPY " N/A 11/25/2020    Procedure: ESOPHAGOGASTRODUODENOSCOPY WITH ANESTHESIA;  Surgeon: Grant Kaur DO;  Location:  [facility] ENDOSCOPY;  Service: Gastroenterology;  Laterality: N/A;  preop; chest pain  postop; normal   PCP Harry Hastings     ENDOSCOPY N/A 11/11/2022    Procedure: ESOPHAGOGASTRODUODENOSCOPY WITH ANESTHESIA;  Surgeon: Grant Kaur DO;  Location:  [facility] ENDOSCOPY;  Service: Gastroenterology;  Laterality: N/A;  Pre: Nausea  Post:normal  Harry Hastings MD    HYSTERECTOMY       HEALTH MAINTENANCE ITEMS:  Health Maintenance Due   Topic Date Due    TDAP/TD VACCINES (1 - Tdap) Never done    ZOSTER VACCINE (1 of 2) Never done    HEPATITIS C SCREENING  Never done    DXA SCAN  01/14/2023    INFLUENZA VACCINE  08/01/2024    COVID-19 Vaccine (7 - 2023-24 season) 09/01/2024    LIPID PANEL  11/14/2024    ANNUAL WELLNESS VISIT  11/16/2024       <no information>  Last Completed Colonoscopy            COLORECTAL CANCER SCREENING (COLONOSCOPY - Every 5 Years) Next due on 10/19/2025      10/19/2020  Surgical Procedure: COLONOSCOPY    10/19/2020  COLONOSCOPY    10/01/2020  Outside Procedure: COLONOSCOPY    02/25/2019  Occult Blood X 3, Stool - Stool, Per Rectum    10/16/2015  SCANNED - COLONOSCOPY    Only the first 5 history entries have been loaded, but more history exists.                  Immunization History   Administered Date(s) Administered    COVID-19 (PFIZER) 12YRS+ (COMIRNATY) 01/11/2024    COVID-19 (PFIZER) BIVALENT 12+YRS 02/08/2023    COVID-19 (PFIZER) Purple Cap Monovalent 01/15/2021, 02/05/2021, 08/26/2021    Covid-19 (Pfizer) Gray Cap Monovalent 05/19/2022    FLUAD TRI 65YR+ 10/06/2017, 10/09/2018    Fluzone  >6mos 10/01/2018    Fluzone (or Fluarix & Flulaval for VFC) >6mos 11/01/2019    Fluzone High-Dose 65+YRS 11/11/2021    Fluzone Quad >6mos (Multi-dose) 10/01/2018    Hep B, Unspecified 01/10/2002, 02/15/2002, 05/16/2002    Influenza, Unspecified 11/02/2020    Pneumococcal Conjugate 13-Valent  "(PCV13) 10/06/2017    Pneumococcal Polysaccharide (PPSV23) 10/01/2018     Last Completed Mammogram            MAMMOGRAM (Every 2 Years) Next due on 1/19/2025 01/19/2023  Outside Claim: HC MAMMOGRAM SCREENING BILAT DIGITAL W CAD    01/19/2023  Outside Claim: CHG SCREENING DIGITAL BREAST TOMOSYNTHESIS BI    01/17/2022  Outside Claim: HC MAMMOGRAM SCREENING BILAT DIGITAL W CAD    01/17/2022  Outside Claim: CHG SCREENING DIGITAL BREAST TOMOSYNTHESIS BI    01/14/2021  Outside Claim: HC MAMMOGRAM SCREENING BILAT DIGITAL W CAD    Only the first 5 history entries have been loaded, but more history exists.                      FAMILY HISTORY:  Family History   Problem Relation Age of Onset    Cancer Mother     Cancer Father         lung    Cancer Paternal Grandmother         stomach    No Known Problems Brother     No Known Problems Maternal Aunt     No Known Problems Maternal Uncle     No Known Problems Paternal Aunt     No Known Problems Paternal Uncle     No Known Problems Maternal Grandmother     No Known Problems Maternal Grandfather     No Known Problems Paternal Grandfather     No Known Problems Other     Colon cancer Neg Hx     Esophageal cancer Neg Hx     Asthma Neg Hx     Diabetes Neg Hx     Emphysema Neg Hx     Heart failure Neg Hx     Hypertension Neg Hx     Colon polyps Neg Hx      SOCIAL HISTORY:  Social History     Socioeconomic History    Marital status:    Tobacco Use    Smoking status: Never     Passive exposure: Current    Smokeless tobacco: Never   Vaping Use    Vaping status: Never Used   Substance and Sexual Activity    Alcohol use: No    Drug use: No    Sexual activity: Defer       REVIEW OF SYSTEMS:  Constitutional:   The patient's appetite is good, \"good.\"  Her energy is variable, \"still some days better than others but ok overall.\" She manages her ADLs including chores, errands.  She still drives.  She has lost 2 lb (in addition to 2 lb at her prior visit) since her last visit.  She has " "no fevers, chills, or drenching night sweats.  Her sleep habits seem appropriate.  Ear/Nose/Throat/Mouth:   She reports no ear pains, sinus symptoms, sore throat, nosebleeds, or sore tongue. She has migraine headaches. She denies any hoarseness.   Ocular:   She reports no eye pain, significant change in visual acuity, double vision, or blurry vision.  Respiratory:   Says she is prone to respiratory infections due to alpha 1 antitrypsin deficiency.  No lung infections since last visit, 10/2023 and 01/2024.  Says that through it all her home O2 sats run 97-98% on RA.  No cough.  She has some exertional dyspnea from possible asthma but no chronic cough, significant shortness of breathing at rest or unexplained chest wall pain. Says prior PFTs suggest \"asthma\".  Is followed by [facility]  Cardiovascular:   She reports no exertional chest pain, chest pressure, or chest heaviness. She reports no claudication. She reports occasional palpitations but denies symptomatic orthostasis.  Gastrointestinal:   She reports no dysphagia, nausea, vomiting, postprandial abdominal pain, bloating, cramping, change in bowel habits, with dark (OTC iron) discoloration of the stool. She reports no rectal bleeding. She reports occasional but chronic constipation requiring stool softeners, lifelong. She has no diarrhea.  Genitourinary:   She reports no urinary burning, frequency, dribbling, or discoloration. She reports no difficulty controlling her bladder. She has no need to urinate frequently through the night.   Musculoskeletal:          She reports no unexplained arthralgias, myalgias, or nighttime leg cramping.  Says she sustained a right hip fracture following a fall, 04/13/2021.  Non-surgical management.  Extremities:   She reports no trouble with fluid retention or significant leg swelling.  Endocrine:   She reports no problems with excess thirst, excessive urination, vasomotor instability, or unexplained " "fatigue.  Heme/Lymphatic:   She reports easy bruising but no unexplained bleeding, petechial rashes, or swollen glands.  Skin:   She reports no itching, rashes, or lesions which won't heal.  Neuro:   She reports no loss of consciousness, seizures, fainting spells, or dizziness. She reports no weakness of face, arms, or legs. She has no difficulty with speech. She has no tremors. She admits to occasional paresthesias of the hands.   Psych:   She seems generally satisfied with life. She denies depression. She reports no mood swings.       VITAL SIGNS: /74   Pulse 55   Temp 96.6 °F (35.9 °C) (Temporal)   Resp 18   Ht 165.1 cm (65\")   Wt 62.2 kg (137 lb 3.2 oz)   LMP  (LMP Unknown)   SpO2 99%   BMI 22.83 kg/m² Body surface area is 1.68 meters squared.  Pain Score    10/17/24 0909   PainSc: 0-No pain         PHYSICAL EXAMINATION:   General:   She is a pleasant, cooperative, slender but otherwise well-developed, well-nourished, and modestly-kept elderly female who is comfortable at rest. She arrived in the exam room ambulatory. She appears to be her stated age. Her skin color is normal. ECOG 0  Head/Neck:   The patient is anicteric and atraumatic. The trachea is midline. The neck is supple without evidence of jugular venous distention or cervical adenopathy. Prominent, non-tender right sternoclavicular joint.  Eyes:   The pupils are equal, round, and reactive to light. The extraocular movements are full. There is no scleral jaundice or erythema.   Chest:   The respiratory efforts are normal and unhindered. There are soft scattered wheezes, but no rales, rhonchi or asymmetry of breath sounds.  Cardiovascular:   The patient has a regular cardiac rate and rhythm without murmurs, rubs, or gallops. The peripheral pulses are equal and full.  Abdomen:   The belly is soft and flat. There is no rebound or guarding. There is no organomegaly, mass-effect, or tenderness. Bowel sounds are active and of normal " character.  Extremities:   There is no evidence of cyanosis, clubbing, or edema.  Rheumatologic:   There is no overt evidence of rheumatoid deformities of the hands. There is no sausaging of the fingers. There is no sign of active synovitis. The gait is normal.  Cutaneous:   There are no overt rashes, disseminated lesions, purpura, or petechiae.   Lymphatics:   There is no evidence of adenopathy in the cervical, supraclavicular, axillary, inguinal, or femoral areas. There is no overt splenomegaly.  Neurologic:   The patient is alert, oriented, cooperative, and pleasant. She is appropriately conversant. She ambulated into the exam room without assistance and transferred from chair to exam table unaided. There is no overt dysfunction of the motor, sensory, cerebellar systems.  Psych:   Mood and affect are appropriate for circumstance. Eye contact is appropriate. Normal judgement and decision making.         LABS    Lab Results - Last 18 Months   Lab Units 10/10/24  0917 04/11/24  0759 12/12/23  0914 10/11/23  0849 07/21/23  0920   HEMOGLOBIN g/dL 12.0 11.9* 11.6* 11.4* 11.6*   HEMATOCRIT % 36.7 37.7 36.5 36.3 36.9   MCV fL 91.5 92.4 92.6 93.1 94.1   WBC 10*3/mm3 17.10* 11.87* 14.63* 15.25* 12.26*   RDW % 12.6 12.8 13.2 13.0 13.1   MPV fL 8.8 9.4 9.6 9.3 9.1   PLATELETS 10*3/mm3 287 220 221 259 315   NEUTROS ABS 10*3/mm3 5.53 2.28 3.80 2.93 2.79   LYMPHS ABS 10*3/mm3  --   --   --   --  8.89*   MONOS ABS 10*3/mm3  --   --   --   --  0.42   EOS ABS 10*3/mm3 0.17 0.12 0.29  --  0.08   BASOS ABS 10*3/mm3 0.00  --  0.15  --  0.05   NEUTROPHIL % % 29.3* 19.2* 26.0* 19.2*  --    MONOCYTES % % 4.0* 3.0* 3.0* 1.0*  --    BASOPHIL % % 0.0  --  1.0  --   --    ATYP LYMPH % % 4.0 2.0 8.0*  --   --    ANISOCYTOSIS  Slight/1+  --  Slight/1+  --   --        Lab Results - Last 18 Months   Lab Units 10/10/24  1442 10/10/24  0917 04/11/24  0759 12/12/23  0914 10/11/23  0849 07/21/23  0920   GLUCOSE mg/dL 106* 131* 109* 132* 113* 108*    SODIUM mmol/L 133* 130* 140 139 141 138   POTASSIUM mmol/L 3.7 3.6 4.4 4.4 4.2 4.3   CO2 mmol/L 32.0* 29.0 30.0* 31.0* 28.0 29.0   CHLORIDE mmol/L 93* 91* 103 100 104 101   ANION GAP mmol/L 8.0 10.0 7.0 8.0 9.0 8.0   CREATININE mg/dL 0.71 0.76 0.76 0.80 0.68 0.69   BUN mg/dL 13 13 13 10 16 15   BUN / CREAT RATIO  18.3 17.1 17.1 12.5 23.5 21.7   CALCIUM mg/dL 9.4 9.4 9.2 9.0 9.3 9.2   ALK PHOS U/L  --  133* 84 88 105 105   TOTAL PROTEIN g/dL  --  7.0 6.3 6.2 6.5 6.2   ALT (SGPT) U/L  --  17 10 11 11 9   AST (SGOT) U/L  --  24 18 17 16 14   BILIRUBIN mg/dL  --  0.7 0.6 0.7 0.6 0.4   ALBUMIN g/dL  --  4.1 4.3 4.1 4.3 4.0   GLOBULIN gm/dL  --  2.9 2.0 2.1 2.2 2.2       Lab Results - Last 18 Months   Lab Units 10/10/24  0924  0759 23  0914 10/11/23  0849 23  0920   LDH U/L 218* 206 221* 224* 197       Lab Results - Last 18 Months   Lab Units 10/10/24  0924  0759 23  0914 10/11/23  0849   IRON mcg/dL 41 103 114 78   TIBC mcg/dL 328 332 320 308   IRON SATURATION (TSAT) % 13* 31 36 25   FERRITIN ng/mL 237.90* 109.20 138.40 136.80       ASSESSMENT:   1. B cell chronic lymphocytic leukemia (B-CLL):   Stage: 0   Tumor [place]: Lymphocytosis.   Complications of Tumor: None.   Tumor Status: Untreated.   IVGH: Pending.   CD38: Negative.   ZAP-70: Negative.   Isolated del 13q14.3 (favorable)   LDH: 218, 10/10/2024 (prior: 197 - 599)   B2M: 1.8, 10/10/24 (prior: 1.3 - 2.1)   Ig, 24 (prior: 517 - 742)  10/10/2024-WBC 17.1; ALC 10.7 (range: WBC 13.0-23.99; ALC 8.64-15.25)  2. Normocytic anemia.    --Repleted ferritin - 237, 10/10/2024 (from 109; 136; 111; 184; 133; 116; 159, 145.4, 131.5; 53.2; 35; 23).   --Hgb 12; MCV 91.5, 10/10/2024(prior: Hgb 11.3 - 13.3; MCV 87 - 98.2)   3. Irritable bowel syndrome. On Bentyl  4. Gastroparesis. On omeprazole  5. Migraines. On Excedrin migraines  6. Cervical degenerative disk disease.  No meds        7.  Alpha 1 antitrypsin deficiency. Dr. Johnson.   Now followed by Dr. Vasquez        8.  Palpitations with echo, 07/24/2020 showing grade II diastolic dysfunction but EF > 70%.  Dr. Hawk follows        9.  Prominent right sterno-clavicular joint. Asymptomatic         --09/02/2020-right sternoclavicular joint x-ray.  Mild arthritic changes of the inferior proximal right clavicle adjacent to the sternoclavicular joint with appropriate alignment.         10. Received COVID # 4 vaccination, 05/2022       11. Recurrent pulmonary infections.  Followed by pulmonary in [facility]- Dr. Russell  --1/24/2023- Visit with Dr. [id number] on recurrent respiratory infections and immunoglobulin G deficiency. She reports that recently she had again symptoms of upper respiratory tract infection. Her CT of the chest showed areas of groundglass opacities that are scattered and calcified granulomas.  The patient's recurrent upper respiratory infections symptoms could be related to common variable immune deficiency secondary to low IgG versus immune deficiency secondary to CLL or both. She might benefit from a trial of IVIG. We will proceed with IVIG.  --7/25/23- CT chest- Multiple nodular densities within the lungs bilaterally which have developed in the interval.  PET imaging recommended. Unexpected findings.  Multiple new pulmonary masses.   --9/25/23 - CT chest.  Stable nonaggressive nodules.  Improving bronchiolitis.  Remote granulomatous disease.  No new abnormality.  --10/23/23- follow-up Dr. Vasquez.  A/P:  Lung nodules- CT CHEST WO CONTRAST; Future.  Alpha-1-antitrypsin deficiency carrier.  CVID (common variable immunodeficiency) (HCC).  Pneumonia of right middle lobe due to infectious organism.  CLL (chronic lymphocytic leukemia) (HCC).  OHARA (dyspnea on exertion).  We reviewed the CT images with the patient. There is definite clearing of the prior infiltrate. She does have low IgG level. She also has CLL which predisposes her to recurrent infections. We discussed  starting IVIG. I will discuss this with Dr. Dumont her hematologist at [facility].  We will repeat CT of the chest in 6 months to assure stability of her nodules.   --11/2/23- Consult with immunology, Dr. Simon, 11/22/23-A/P: Recurrent infections-likely borderline IgGs due to abnormal B cells.  Do not suspect common variable hypogammaglobulinemia.  At this time I do not feel she would benefit from immunoglobulin replacement.  That might change if she has documented recurrent pneumonitis with infiltrates by chest x-ray (reports pending) and strong evidence of lack of immune function (vaccine response) and has a low IgA/or IgM.  In the past she has received antibiotics and steroids.  Would definitely avoid the latter if at all possible.  It may be she has higher than average need for antibiotics, however documented bacterial infection by x-rays or culture or highly suspected by labs given her diagnosis of CLL.  Draw labs (diphtheria IgG antibody, IgA, IgG, IgM, lymphocyte subset, pneumococcal IgG 23 serotypes.  Tetanus IgG antibody.  Follow-up 5-6 weeks.  --4/9/24- Follow-up Dr. Vasquez-ASSESSMENT/PLAN: 1. Lung nodules - CT CHEST WO CONTRAST; Future  2. Alpha-1-antitrypsin deficiency carrier; 3. CVID (common variable immunodeficiency) (HCC); 4. Pneumonia of right middle lobe due to infectious organism; 5. CLL (chronic lymphocytic leukemia) (HCC). Will repeat CT of the chest in one year and follow up at that time. She is doing well. Not IVIG candidate per Dr. Cedeno.     12.  Mild hyponatremia.  That HCTZ recently added to her BP regimen.      RECOMMENDATIONS:   1.   Apprised of labs from 4/11/24-10/10/24, with sodium 130 (repeat 133), glucose 131 (repeat 106), otherwise stable CMP, stable leukocytosis/lymphocytosis, normal ANC, stable anemia, normal platelets, normal (1.8) B2M,  (prior peak: 299), iron 41, ferritin >200, adequate IgG 640 (prior: 517-742).    2.   Again review consult, 11/22/23  "from Dr. Simon of immunology.  A/P:  Does not feel IVIG warranted (see above).  3.  Previously reviewed follow-up Dr. Vasqeuz, 4/9/24-(above). CT chest 1 year  4.  Previously discussed the bone marrow biopsy, 02/01 (above). Confirms B-CLL with del 13q14 (favorable)   5.  B- CLL again previously discussed. I had explained that standard therapy has not appreciably altered the nature history of CLL and survival curves demonstrate that CLL is an incurable disease. Randomized studies have failed to show a survival advantage of immediate treatment over delayed treatment for asymptomatic patients with early stage disease. As a result, treatment is generally reserved until the patient has active disease defined as the presence of disease-related symptoms: Weight loss greater than 10%; extreme fatigue; fever greater than 100.5 for 2 weeks with night sweats without evidence of infection (\"B\" symptoms); worsening cytopenias (HGB less than 11; platelets less than 100,000); unexplained recurrent infections; worsening adenopathy, or splenomegaly. She is aware to call should she develop any of these symptoms.      6.  Return to the [place] office in 24 weeks with pre-office serum iron, Fe sat, ferritin, CBC and differential, IgG, LDH, B2M.      I spent ~40 minutes caring for Liset on this date of service. This time includes time spent by me in the following activities: preparing for the visit, reviewing tests, performing a medically appropriate examination and/or evaluation, counseling and educating the patient/family/caregiver, ordering medications, tests, or procedures and documenting information in the medical record.       cc: Harry Hastings MD     "

## 2024-10-17 ENCOUNTER — OFFICE VISIT (OUTPATIENT)
Dept: ONCOLOGY | Facility: CLINIC | Age: 74
End: 2024-10-17
Payer: MEDICARE

## 2024-10-17 VITALS
OXYGEN SATURATION: 99 % | TEMPERATURE: 96.6 F | BODY MASS INDEX: 22.86 KG/M2 | DIASTOLIC BLOOD PRESSURE: 74 MMHG | HEIGHT: 65 IN | HEART RATE: 55 BPM | SYSTOLIC BLOOD PRESSURE: 124 MMHG | RESPIRATION RATE: 18 BRPM | WEIGHT: 137.2 LBS

## 2024-10-17 DIAGNOSIS — C91.10 CLL (CHRONIC LYMPHOCYTIC LEUKEMIA): Primary | ICD-10-CM

## 2024-10-17 RX ORDER — LOSARTAN POTASSIUM 50 MG/1
100 TABLET ORAL DAILY
COMMUNITY

## 2024-10-17 RX ORDER — AMLODIPINE BESYLATE 10 MG/1
1 TABLET ORAL DAILY
COMMUNITY
Start: 2024-10-04

## 2024-11-14 NOTE — PROGRESS NOTES
[facility] - PODIATRY    Today's Date: 11/18/2024     Patient Name: Liset Wright  MRN: [id number]  CSN: [id number]  PCP: Harry Hastings MD  Referring Provider: No ref. provider found    SUBJECTIVE     Chief Complaint   Patient presents with    Follow-up     Harry Hastings MD-02/13/2024 3 MTH F/U-pt states she is here today for nail/foot care-slight discomfort if pressure is applied to right greater toenail.     HPI: Liset Wright, a 74 y.o.female, comes to clinic as a(n) established patient complaining of ingrown toenails. Patient has h/o Acid Reflux, Alpha-1-Antitrypsin Deficiency, Arrhythmia, Arthritis, Asthma, Leukemia, HTN, Low Back Pain, Palpitations, and Polyneuropathy . Patient is non-diabetic. Here today with complaints of elongated, thickened toenails that are in need of care.  Relays that her right greater toenail feels like it is ingrown again as it is slightly uncomfortable when pressure is applied.  She denies drainage.  Is concerned about the appearance of bilateral greater toenails after having an avulsion several months ago.  Denies open wounds or sores today.  Admits pain at 2/10 level today. Relates previous treatment(s) including bilateral PNA . Denies any constitutional symptoms. No other pedal complaints at this time.    Past Medical History:   Diagnosis Date    Acid reflux     Alpha-1-antitrypsin deficiency     Arrhythmia     Arthritis     Asthma     Cancer     leukemia    Hypertension     Ingrown toenail     Low back pain     Palpitations      Past Surgical History:   Procedure Laterality Date    CARDIAC CATHETERIZATION      CHOLECYSTECTOMY      COLONOSCOPY  10/16/2015    normal    COLONOSCOPY N/A 10/19/2020    Procedure: COLONOSCOPY WITH ANESTHESIA;  Surgeon: Grant Kaur DO;  Location: [facility] ENDOSCOPY;  Service: Gastroenterology;  Laterality: N/A;  pre: screening  post: normal  Harry Hastings MD    ENDOSCOPY N/A 11/23/2016    normal    ENDOSCOPY N/A  12/11/2018    Procedure: ESOPHAGOGASTRODUODENOSCOPY WITH ANESTHESIA;  Surgeon: Grant Kaur DO;  Location: [facility] ENDOSCOPY;  Service: Gastroenterology    ENDOSCOPY N/A 11/25/2020    Procedure: ESOPHAGOGASTRODUODENOSCOPY WITH ANESTHESIA;  Surgeon: Grant Kaur DO;  Location: [facility] ENDOSCOPY;  Service: Gastroenterology;  Laterality: N/A;  preop; chest pain  postop; normal   PCP Harry Hastings     ENDOSCOPY N/A 11/11/2022    Procedure: ESOPHAGOGASTRODUODENOSCOPY WITH ANESTHESIA;  Surgeon: Grant Kaur DO;  Location: [facility] ENDOSCOPY;  Service: Gastroenterology;  Laterality: N/A;  Pre: Nausea  Post:normal  Harry Hastings MD    HYSTERECTOMY       Family History   Problem Relation Age of Onset    Cancer Mother     Cancer Father         lung    Cancer Paternal Grandmother         stomach    No Known Problems Brother     No Known Problems Maternal Aunt     No Known Problems Maternal Uncle     No Known Problems Paternal Aunt     No Known Problems Paternal Uncle     No Known Problems Maternal Grandmother     No Known Problems Maternal Grandfather     No Known Problems Paternal Grandfather     No Known Problems Other     Colon cancer Neg Hx     Esophageal cancer Neg Hx     Asthma Neg Hx     Diabetes Neg Hx     Emphysema Neg Hx     Heart failure Neg Hx     Hypertension Neg Hx     Colon polyps Neg Hx      Social History     Socioeconomic History    Marital status:    Tobacco Use    Smoking status: Never     Passive exposure: Current    Smokeless tobacco: Never   Vaping Use    Vaping status: Never Used   Substance and Sexual Activity    Alcohol use: No    Drug use: No    Sexual activity: Defer     Allergies   Allergen Reactions    Levofloxacin Paresthesia    Phenergan [Promethazine Hcl] Other (See Comments)     Jerky movements    Promethazine Unknown - High Severity    Ramipril Palpitations     Current Outpatient Medications   Medication Sig Dispense Refill    albuterol sulfate  (90 Base) MCG/ACT  inhaler Inhale 2 puffs Every 4 (Four) Hours As Needed for Wheezing. 18 g 4    amLODIPine (NORVASC) 10 MG tablet Take 1 tablet by mouth Daily.      dicyclomine (BENTYL) 10 MG capsule Take 1 capsule by mouth 2 (Two) Times a Day.      losartan (COZAAR) 50 MG tablet Take 2 tablets by mouth Daily.      metoprolol tartrate (LOPRESSOR) 100 MG tablet Take 1 tablet by mouth 3 (Three) Times a Day.      omeprazole (priLOSEC) 40 MG capsule Take 1 capsule by mouth Daily.       No current facility-administered medications for this visit.     Review of Systems   Constitutional:  Negative for activity change and fever.   HENT:  Negative for congestion.    Respiratory:  Negative for shortness of breath.    Cardiovascular:  Negative for chest pain and leg swelling.   Gastrointestinal:  Negative for abdominal pain, constipation, diarrhea, nausea and vomiting.   Musculoskeletal:  Positive for arthralgias.        Pain around right greater toenail   Skin:  Negative for color change and wound.        Possibly ingrown right greater toenail.  Irregular looking greater toenails.   Neurological:  Negative for dizziness, weakness and numbness.   Psychiatric/Behavioral:  Negative for agitation, behavioral problems and confusion.        OBJECTIVE     Vitals:    11/18/24 0808   BP: 138/72   Pulse: 51   SpO2: 97%               PHYSICAL EXAM  GEN:   Accompanied by none.     Foot/Ankle Exam    GENERAL  Appearance:  appears stated age  Orientation:  AAOx3  Affect:  appropriate  Gait:  unimpaired  Assistance:  independent  Right shoe gear: casual shoe  Left shoe gear: casual shoe    VASCULAR     Right Foot Vascularity   Dorsalis pedis:  2+  Posterior tibial:  2+  Skin temperature:  warm  Edema grading:  None  CFT:  3  Pedal hair growth:  Present  Varicosities:  none     Left Foot Vascularity   Dorsalis pedis:  2+  Posterior tibial:  2+  Skin temperature:  warm  Edema grading:  None  CFT:  3  Pedal hair growth:  Present  Varicosities:  none      NEUROLOGIC     Right Foot Neurologic   Light touch sensation: diminished  Vibratory sensation: normal  Hot/Cold sensation: normal     Left Foot Neurologic   Light touch sensation: diminished  Vibratory sensation: normal  Hot/Cold sensation:  normal    MUSCULOSKELETAL     Right Foot Musculoskeletal   Ecchymosis:  none  Tenderness:  toe 1 tenderness    Arch:  Normal     Left Foot Musculoskeletal   Ecchymosis:  none  Tenderness:  toe 1 tenderness  Arch:  Normal    MUSCLE STRENGTH     Right Foot Muscle Strength   Normal strength    Foot dorsiflexion:  5  Foot plantar flexion:  5  Foot inversion:  5  Foot eversion:  5     Left Foot Muscle Strength   Normal strength    Foot dorsiflexion:  5  Foot plantar flexion:  5  Foot inversion:  5  Foot eversion:  5    RANGE OF MOTION     Right Foot Range of Motion   Foot and ankle ROM within normal limits       Left Foot Range of Motion   Foot and ankle ROM within normal limits      DERMATOLOGIC      Right Foot Dermatologic   Skin  Right foot skin is intact. Negative for drainage.   Nails  1.  Positive for elongated, abnormal thickness, dystrophic nail and ingrown toenail. Negative for paronychia.  2.  Positive for elongated and abnormal thickness.  3.  Positive for elongated.  4.  Positive for elongated.  5.  Positive for elongated.     Left Foot Dermatologic   Skin  Left foot skin is intact. Negative for drainage.   Nails  1.  Positive for elongated, abnormal thickness, dystrophic nail and ingrown toenail. Negative for paronychia.  2.  Positive for elongated and abnormal thickness.  3.  Positive for elongated.  4.  Positive for elongated.  5.  Positive for elongated.     Right foot additional comments: Slight pain noted upon application of pressure to greater toenail.      RADIOLOGY/NUCLEAR:  No results found.    LABORATORY/CULTURE RESULTS:      PATHOLOGY RESULTS:       ASSESSMENT/PLAN     Diagnoses and all orders for this visit:    1. Pain around toenail, right foot  (Primary)    2. Ingrown nail of great toe of left foot    3. Dystrophic nail    4. Thickened nails    5. Polyneuropathy        Comprehensive lower extremity examination and evaluation was performed.  Discussed findings and treatment plan including risks, benefits, and treatment options with patient in detail. Patient agreed with treatment plan.  After verbal consent obtained, nail(s) x10 debrided of length and thickness with nail nipper without incidence  After verbal consent obtained, nail(s) x2 debrided of offending borders with nail nipper without incidence  Patient may maintain nails and calluses at home utilizing emery board or pumice stone between visits as needed  Discussed patient may benefit from revisional avulsion to bilateral greater toenails in the future if continued to be bothersome.  Discussed that mid to distal portion of nail may fall off at any given time however does feel firmly attached today.  Educated that avulsions may cause damage to the nails which can contribute  to thickness and irregularity.  Encouraged patient to continue with warm epsom salt soaks PRN to keep hard dead skin from building up in the edges.  Encouraged patient to call back if any other issues arise.  Otherwise patient to return in 3 months for recheck.     An After Visit Summary was printed and given to the patient at discharge, including (if requested) any available informative/educational handouts regarding diagnosis, treatment, or medications. All questions were answered to patient/family satisfaction. Should symptoms fail to improve or worsen they agree to call or return to clinic or to go to the Emergency Department. Discussed the importance of following up with any needed screening tests/labs/specialist appointments and any requested follow-up recommended by me today. Importance of maintaining follow-up discussed and patient accepts that missed appointments can delay diagnosis and potentially lead to worsening of  conditions.  Return in about 3 months (around 2/18/2025) for Follow-up with YENNY, Follow-up in [facility]., or sooner if acute issues arise.    I spent 12 minutes caring for Liset on this date of service. This time includes time spent by me in the following activities: preparing for the visit, performing a medically appropriate examination and/or evaluation, counseling and educating the patient/family/caregiver, and documenting information in the medical record    I spent an additional 8 minutes caring for Liset on this date of service.  This time includes time spent by me in the following activities: Nail debridement, ingrown toenail offending border(s) debridement.      This document has been electronically signed by YENNY Hsieh on November 18, 2024 09:06 CST

## 2024-11-18 ENCOUNTER — OFFICE VISIT (OUTPATIENT)
Age: 74
End: 2024-11-18
Payer: MEDICARE

## 2024-11-18 VITALS
BODY MASS INDEX: 22.49 KG/M2 | DIASTOLIC BLOOD PRESSURE: 72 MMHG | OXYGEN SATURATION: 97 % | WEIGHT: 135 LBS | HEART RATE: 51 BPM | SYSTOLIC BLOOD PRESSURE: 138 MMHG | HEIGHT: 65 IN

## 2024-11-18 DIAGNOSIS — M79.674 PAIN AROUND TOENAIL, RIGHT FOOT: Primary | ICD-10-CM

## 2024-11-18 DIAGNOSIS — L60.0 INGROWN NAIL OF GREAT TOE OF LEFT FOOT: ICD-10-CM

## 2024-11-18 DIAGNOSIS — L60.2 THICKENED NAILS: ICD-10-CM

## 2024-11-18 DIAGNOSIS — G62.9 POLYNEUROPATHY: ICD-10-CM

## 2024-11-18 DIAGNOSIS — L60.3 DYSTROPHIC NAIL: ICD-10-CM

## 2024-11-18 PROCEDURE — 11721 DEBRIDE NAIL 6 OR MORE: CPT

## 2024-11-18 PROCEDURE — 1160F RVW MEDS BY RX/DR IN RCRD: CPT

## 2024-11-18 PROCEDURE — 1159F MED LIST DOCD IN RCRD: CPT

## 2024-11-18 PROCEDURE — 3075F SYST BP GE 130 - 139MM HG: CPT

## 2024-11-18 PROCEDURE — 99212 OFFICE O/P EST SF 10 MIN: CPT

## 2024-11-18 PROCEDURE — 3078F DIAST BP <80 MM HG: CPT

## 2025-02-21 NOTE — PROGRESS NOTES
[facility] - PODIATRY    Today's Date: 02/25/2025     Patient Name: Liset Wright  MRN: [id number]  CSN: [id number]  PCP: Harry Hastings MD  Referring Provider: No ref. provider found    SUBJECTIVE     Chief Complaint   Patient presents with    Follow-up     Harry Hastings MD 04/09/24   Return in about 3 months (around 2/18/2025) for Follow-up with APRN, Follow-up in [facility].// RESCHEDULED   Pt states she is here today for foot/nail care.  Mild discomfort around greater toenails     HPI: Liset Wright, a 74 y.o.female, comes to clinic as a(n) established patient complaining of ingrown toenails. Patient has h/o Acid Reflux, Alpha-1-Antitrypsin Deficiency, Arrhythmia, Arthritis, Asthma, Leukemia, HTN, Low Back Pain, Palpitations, and Polyneuropathy . Patient is non-diabetic. Notes toenails are in need of care as they are elongated, thickened, and greater toenails are irregular.  Relays mild discomfort around bilateral greater toenails with application of pressure. She denies drainage. Denies open wounds or sores today.   Admits to mild discomfort however did not place number on pain scale  today. Relates previous treatment(s) including bilateral PNA . Denies any constitutional symptoms. No other pedal complaints at this time.    Past Medical History:   Diagnosis Date    Acid reflux     Alpha-1-antitrypsin deficiency     Arrhythmia     Arthritis     Asthma     Cancer     leukemia    Hypertension     Ingrown toenail     Low back pain     Palpitations      Past Surgical History:   Procedure Laterality Date    CARDIAC CATHETERIZATION      CHOLECYSTECTOMY      COLONOSCOPY  10/16/2015    normal    COLONOSCOPY N/A 10/19/2020    Procedure: COLONOSCOPY WITH ANESTHESIA;  Surgeon: Grant Kaur DO;  Location: [facility] ENDOSCOPY;  Service: Gastroenterology;  Laterality: N/A;  pre: screening  post: normal  Harry Hastings MD    ENDOSCOPY N/A 11/23/2016    normal    ENDOSCOPY N/A 12/11/2018     Procedure: ESOPHAGOGASTRODUODENOSCOPY WITH ANESTHESIA;  Surgeon: Grant Kaur DO;  Location: [facility] ENDOSCOPY;  Service: Gastroenterology    ENDOSCOPY N/A 11/25/2020    Procedure: ESOPHAGOGASTRODUODENOSCOPY WITH ANESTHESIA;  Surgeon: Grant Kaur DO;  Location: [facility] ENDOSCOPY;  Service: Gastroenterology;  Laterality: N/A;  preop; chest pain  postop; normal   PCP Harry Hastings     ENDOSCOPY N/A 11/11/2022    Procedure: ESOPHAGOGASTRODUODENOSCOPY WITH ANESTHESIA;  Surgeon: Grant Kaur DO;  Location: [facility] ENDOSCOPY;  Service: Gastroenterology;  Laterality: N/A;  Pre: Nausea  Post:normal  Harry Hastings MD    HYSTERECTOMY       Family History   Problem Relation Age of Onset    Cancer Mother     Cancer Father         lung    Cancer Paternal Grandmother         stomach    No Known Problems Brother     No Known Problems Maternal Aunt     No Known Problems Maternal Uncle     No Known Problems Paternal Aunt     No Known Problems Paternal Uncle     No Known Problems Maternal Grandmother     No Known Problems Maternal Grandfather     No Known Problems Paternal Grandfather     No Known Problems Other     Colon cancer Neg Hx     Esophageal cancer Neg Hx     Asthma Neg Hx     Diabetes Neg Hx     Emphysema Neg Hx     Heart failure Neg Hx     Hypertension Neg Hx     Colon polyps Neg Hx      Social History     Socioeconomic History    Marital status:    Tobacco Use    Smoking status: Never     Passive exposure: Current    Smokeless tobacco: Never   Vaping Use    Vaping status: Never Used   Substance and Sexual Activity    Alcohol use: No    Drug use: No    Sexual activity: Defer     Allergies   Allergen Reactions    Levofloxacin Paresthesia    Phenergan [Promethazine Hcl] Other (See Comments)     Jerky movements    Promethazine Unknown - High Severity    Ramipril Palpitations     Current Outpatient Medications   Medication Sig Dispense Refill    albuterol sulfate  (90 Base) MCG/ACT inhaler Inhale 2  puffs Every 4 (Four) Hours As Needed for Wheezing. 18 g 4    amLODIPine (NORVASC) 10 MG tablet Take 1 tablet by mouth Daily.      dicyclomine (BENTYL) 10 MG capsule Take 1 capsule by mouth 2 (Two) Times a Day.      losartan (COZAAR) 50 MG tablet Take 2 tablets by mouth Daily.      metoprolol tartrate (LOPRESSOR) 100 MG tablet Take 1 tablet by mouth 3 (Three) Times a Day.      omeprazole (priLOSEC) 40 MG capsule Take 1 capsule by mouth Daily.       No current facility-administered medications for this visit.     Review of Systems   Constitutional:  Negative for activity change and fever.   HENT:  Negative for congestion.    Respiratory:  Negative for shortness of breath.    Cardiovascular:  Negative for chest pain and leg swelling.   Gastrointestinal:  Negative for abdominal pain, constipation, diarrhea, nausea and vomiting.   Musculoskeletal:  Positive for arthralgias.        Pain around greater toenails   Skin:  Negative for color change and wound.        Thickened, elongated toenails.  Irregular looking greater toenails.   Neurological:  Negative for dizziness, weakness and numbness.   Psychiatric/Behavioral:  Negative for agitation, behavioral problems and confusion.        OBJECTIVE     Vitals:    02/25/25 0847   BP: 110/62   Pulse: 53   SpO2: 99%                 PHYSICAL EXAM  GEN:   Accompanied by none.     Foot/Ankle Exam    GENERAL  Appearance:  appears stated age  Orientation:  AAOx3  Affect:  appropriate  Gait:  unimpaired  Assistance:  independent  Right shoe gear: casual shoe  Left shoe gear: casual shoe    VASCULAR     Right Foot Vascularity   Dorsalis pedis:  2+  Posterior tibial:  2+  Skin temperature:  warm  Edema grading:  None  CFT:  3  Pedal hair growth:  Present  Varicosities:  none     Left Foot Vascularity   Dorsalis pedis:  2+  Posterior tibial:  2+  Skin temperature:  warm  Edema grading:  None  CFT:  3  Pedal hair growth:  Present  Varicosities:  none     NEUROLOGIC     Right Foot Neurologic    Light touch sensation: diminished  Vibratory sensation: normal  Hot/Cold sensation: normal     Left Foot Neurologic   Light touch sensation: diminished  Vibratory sensation: normal  Hot/Cold sensation:  normal    MUSCULOSKELETAL     Right Foot Musculoskeletal   Ecchymosis:  none  Tenderness:  toe 1 tenderness    Arch:  Normal     Left Foot Musculoskeletal   Ecchymosis:  none  Tenderness:  toe 1 tenderness  Arch:  Normal    MUSCLE STRENGTH     Right Foot Muscle Strength   Normal strength    Foot dorsiflexion:  5  Foot plantar flexion:  5  Foot inversion:  5  Foot eversion:  5     Left Foot Muscle Strength   Normal strength    Foot dorsiflexion:  5  Foot plantar flexion:  5  Foot inversion:  5  Foot eversion:  5    RANGE OF MOTION     Right Foot Range of Motion   Foot and ankle ROM within normal limits       Left Foot Range of Motion   Foot and ankle ROM within normal limits      DERMATOLOGIC      Right Foot Dermatologic   Skin  Right foot skin is intact. Negative for drainage.   Nails  1.  Positive for elongated, abnormal thickness, dystrophic nail and ingrown toenail. Negative for paronychia.  2.  Positive for elongated and abnormal thickness.  3.  Positive for elongated.  4.  Positive for elongated.  5.  Positive for elongated.     Left Foot Dermatologic   Skin  Left foot skin is intact. Negative for drainage.   Nails  1.  Positive for elongated, abnormal thickness and dystrophic nail. Negative for ingrown toenail and paronychia.  2.  Positive for elongated and abnormal thickness.  3.  Positive for elongated.  4.  Positive for elongated.  5.  Positive for elongated.      RADIOLOGY/NUCLEAR:  No results found.    LABORATORY/CULTURE RESULTS:      PATHOLOGY RESULTS:       ASSESSMENT/PLAN     Diagnoses and all orders for this visit:    1. Dystrophic nail (Primary)    2. Thickened nails    3. Pain around toenail    4. Ingrown right greater toenail    5. Polyneuropathy          Comprehensive lower extremity examination  and evaluation was performed.  Discussed findings and treatment plan including risks, benefits, and treatment options with patient in detail. Patient agreed with treatment plan.  After verbal consent obtained, nail(s) x10 debrided of length and thickness with nail nipper without incidence  After verbal consent obtained, nail(s) x1 debrided of offending borders with nail nipper without incidence  Patient may maintain nails and calluses at home utilizing emery board or pumice stone between visits as needed  May benefit from revisional PNA versus TNA to bilateral greater toenails in the future if continued to be bothersome.  Again discussed that avulsions may cause damage to the nails which can contribute  to thickness and irregularity.  Otherwise patient scheduled to return in 3 months.  Encouraged to call sooner with any questions or concerns.     An After Visit Summary was printed and given to the patient at discharge, including (if requested) any available informative/educational handouts regarding diagnosis, treatment, or medications. All questions were answered to patient/family satisfaction. Should symptoms fail to improve or worsen they agree to call or return to clinic or to go to the Emergency Department. Discussed the importance of following up with any needed screening tests/labs/specialist appointments and any requested follow-up recommended by me today. Importance of maintaining follow-up discussed and patient accepts that missed appointments can delay diagnosis and potentially lead to worsening of conditions.  Return in about 3 months (around 5/25/2025) for Follow-up with APRN, Follow-up in [facility]., or sooner if acute issues arise.  I spent 10 minutes caring for Liset on this date of service. This time includes time spent by me in the following activities: preparing for the visit, performing a medically appropriate examination and/or evaluation, counseling and educating the  patient/family/caregiver, and documenting information in the medical record  I spent 5 minutes on the separately reported service of toenail debridement. This time is not included in the time used to support the E/M service also reported today.        I spent an additional 8 minutes caring for Liset on this date of service.  This time includes time spent by me in the following activities: Nail debridement, ingrown toenail offending border(s) debridement.      This document has been electronically signed by YENNY Hsieh on February 25, 2025 10:13 CST

## 2025-02-25 ENCOUNTER — OFFICE VISIT (OUTPATIENT)
Age: 75
End: 2025-02-25
Payer: MEDICARE

## 2025-02-25 VITALS
HEIGHT: 65 IN | SYSTOLIC BLOOD PRESSURE: 110 MMHG | OXYGEN SATURATION: 99 % | BODY MASS INDEX: 22.49 KG/M2 | DIASTOLIC BLOOD PRESSURE: 62 MMHG | HEART RATE: 53 BPM | WEIGHT: 135 LBS

## 2025-02-25 DIAGNOSIS — M79.676 PAIN AROUND TOENAIL: ICD-10-CM

## 2025-02-25 DIAGNOSIS — L60.0 INGROWN RIGHT GREATER TOENAIL: ICD-10-CM

## 2025-02-25 DIAGNOSIS — G62.9 POLYNEUROPATHY: ICD-10-CM

## 2025-02-25 DIAGNOSIS — L60.3 DYSTROPHIC NAIL: Primary | ICD-10-CM

## 2025-02-25 DIAGNOSIS — L60.2 THICKENED NAILS: ICD-10-CM

## 2025-04-01 ENCOUNTER — HOSPITAL ENCOUNTER (OUTPATIENT)
Dept: CT IMAGING | Age: 75
Discharge: HOME | End: 2025-04-01
Payer: MEDICARE

## 2025-04-01 DIAGNOSIS — R91.8 LUNG NODULES: ICD-10-CM

## 2025-04-01 PROCEDURE — 71250 CT THORAX DX C-: CPT

## 2025-04-08 ENCOUNTER — OFFICE VISIT (OUTPATIENT)
Dept: CARDIOLOGY | Facility: CLINIC | Age: 75
End: 2025-04-08
Payer: MEDICARE

## 2025-04-08 VITALS
BODY MASS INDEX: 23.66 KG/M2 | HEART RATE: 52 BPM | SYSTOLIC BLOOD PRESSURE: 145 MMHG | DIASTOLIC BLOOD PRESSURE: 66 MMHG | OXYGEN SATURATION: 98 % | WEIGHT: 142 LBS | HEIGHT: 65 IN

## 2025-04-08 DIAGNOSIS — I10 PRIMARY HYPERTENSION: Primary | ICD-10-CM

## 2025-04-08 DIAGNOSIS — I49.3 PVCS (PREMATURE VENTRICULAR CONTRACTIONS): ICD-10-CM

## 2025-04-08 DIAGNOSIS — R60.0 BILATERAL LEG EDEMA: ICD-10-CM

## 2025-04-08 PROCEDURE — 99214 OFFICE O/P EST MOD 30 MIN: CPT | Performed by: INTERNAL MEDICINE

## 2025-04-08 PROCEDURE — 3077F SYST BP >= 140 MM HG: CPT | Performed by: INTERNAL MEDICINE

## 2025-04-08 PROCEDURE — 3078F DIAST BP <80 MM HG: CPT | Performed by: INTERNAL MEDICINE

## 2025-04-08 RX ORDER — LOSARTAN POTASSIUM AND HYDROCHLOROTHIAZIDE 12.5; 5 MG/1; MG/1
1 TABLET ORAL DAILY
COMMUNITY
Start: 2024-11-19

## 2025-04-08 NOTE — PROGRESS NOTES
"Chief Complaint  PVCs (premature ventricular contractions)  (1 year follow up-Establish care with Dr.C Loo.), Hypertension, and Palpitations    Subjective      Liset Wright presents to [facility] CARDIOLOGY  Hypertension  Associated symptoms include palpitations.   Palpitations     Liset Wright is a former patient of Dr. Ray Hawk.  She has been followed by Caitie RAMON.  The patient has a history of hypertension and palpitations felt due to chronic ventricular ectopy.  She also has a history of chronic lower extremity edema and exertional dyspnea.  Currently, she is doing fairly well.  Her edema is at a minimum as are her palpitations and dyspnea.  She has had no syncope or presyncope.  There has been no PND or orthopnea.      Objective   Vital Signs:  /66 (BP Location: Left arm, Patient Position: Sitting, Cuff Size: Adult)   Pulse 52   Ht 165.1 cm (65\")   Wt 64.4 kg (142 lb)   SpO2 98%   BMI 23.63 kg/m²   Estimated body mass index is 23.63 kg/m² as calculated from the following:    Height as of this encounter: 165.1 cm (65\").    Weight as of this encounter: 64.4 kg (142 lb).    BMI is within normal parameters. No other follow-up for BMI required.      Physical Exam  A 74-year-old female in no apparent distress.  She is awake, alert and oriented x 3.  HEENT: No scleral icterus.  Neck: No carotid bruits or jugular venous distention.  Lungs: Clear to auscultation.  Normal respiratory effort.  Heart: Regular rhythm without audible murmur or gallop sound.  Extremities: Trace pretibial edema.  Pedal pulses intact.  Neurologic: No obvious focal abnormalities noted.    Result Review :                   Assessment and Plan   Diagnoses and all orders for this visit:    1. Primary hypertension (Primary)    2. PVCs (premature ventricular contractions)    3. Bilateral leg edema    1.  Hypertension.  Current blood pressure is 145/66.  Continue amlodipine 10 mg daily and " metoprolol tartrate 100 mg 3 times daily as well as Hyzaar 50-12.5 daily.    2.  Chronic ventricular ectopy and palpitations.  Minimally symptomatic at this time.  Today's electrocardiogram did reveal 1 PAC.    3.  Lower extremity edema.  Likely due to amlodipine 10 mg daily as well as other factors.  It is minimal at the present time.    All questions are answered to the best of my ability.  A return visit is scheduled in a year.  The patient is encouraged to contact us for any further questions or concerns.    As always, I appreciate the opportunity to be of service in the care of your patients.      There are no Patient Instructions on file for this visit.       Follow Up   Return in about 1 year (around 4/8/2026) for Next scheduled follow up.  Patient was given instructions and counseling regarding her condition or for health maintenance advice. Please see specific information pulled into the AVS if appropriate.

## 2025-04-10 ENCOUNTER — LAB (OUTPATIENT)
Dept: LAB | Facility: HOSPITAL | Age: 75
End: 2025-04-10
Payer: MEDICARE

## 2025-04-10 DIAGNOSIS — C91.10 CLL (CHRONIC LYMPHOCYTIC LEUKEMIA): ICD-10-CM

## 2025-04-10 LAB
ALBUMIN SERPL-MCNC: 4.3 G/DL (ref 3.5–5.2)
ALBUMIN/GLOB SERPL: 1.8 G/DL
ALP SERPL-CCNC: 79 U/L (ref 39–117)
ALT SERPL W P-5'-P-CCNC: 12 U/L (ref 1–33)
ANION GAP SERPL CALCULATED.3IONS-SCNC: 11 MMOL/L (ref 5–15)
AST SERPL-CCNC: 18 U/L (ref 1–32)
B2 MICROGLOB SERPL-MCNC: 2 MG/L (ref 0.8–2.2)
BASOPHILS # BLD AUTO: 0.04 10*3/MM3 (ref 0–0.2)
BASOPHILS NFR BLD AUTO: 0.3 % (ref 0–1.5)
BILIRUB SERPL-MCNC: 0.6 MG/DL (ref 0–1.2)
BUN SERPL-MCNC: 17 MG/DL (ref 8–23)
BUN/CREAT SERPL: 18.5 (ref 7–25)
CALCIUM SPEC-SCNC: 9.1 MG/DL (ref 8.6–10.5)
CHLORIDE SERPL-SCNC: 93 MMOL/L (ref 98–107)
CO2 SERPL-SCNC: 31 MMOL/L (ref 22–29)
CREAT SERPL-MCNC: 0.92 MG/DL (ref 0.57–1)
DEPRECATED RDW RBC AUTO: 42.9 FL (ref 37–54)
EGFRCR SERPLBLD CKD-EPI 2021: 66.3 ML/MIN/1.73
EOSINOPHIL # BLD AUTO: 0.05 10*3/MM3 (ref 0–0.4)
EOSINOPHIL NFR BLD AUTO: 0.3 % (ref 0.3–6.2)
ERYTHROCYTE [DISTWIDTH] IN BLOOD BY AUTOMATED COUNT: 12.9 % (ref 12.3–15.4)
FERRITIN SERPL-MCNC: 140.1 NG/ML (ref 13–150)
GLOBULIN UR ELPH-MCNC: 2.4 GM/DL
GLUCOSE SERPL-MCNC: 112 MG/DL (ref 65–99)
HCT VFR BLD AUTO: 34.3 % (ref 34–46.6)
HGB BLD-MCNC: 11.6 G/DL (ref 12–15.9)
IGG1 SER-MCNC: 619 MG/DL (ref 700–1600)
IMM GRANULOCYTES # BLD AUTO: 0.02 10*3/MM3 (ref 0–0.05)
IMM GRANULOCYTES NFR BLD AUTO: 0.1 % (ref 0–0.5)
IRON 24H UR-MRATE: 70 MCG/DL (ref 37–145)
IRON SATN MFR SERPL: 19 % (ref 20–50)
LYMPHOCYTES # BLD AUTO: 11.56 10*3/MM3 (ref 0.7–3.1)
LYMPHOCYTES NFR BLD AUTO: 74.8 % (ref 19.6–45.3)
MCH RBC QN AUTO: 30.7 PG (ref 26.6–33)
MCHC RBC AUTO-ENTMCNC: 33.8 G/DL (ref 31.5–35.7)
MCV RBC AUTO: 90.7 FL (ref 79–97)
MONOCYTES # BLD AUTO: 0.53 10*3/MM3 (ref 0.1–0.9)
MONOCYTES NFR BLD AUTO: 3.4 % (ref 5–12)
NEUTROPHILS NFR BLD AUTO: 21.1 % (ref 42.7–76)
NEUTROPHILS NFR BLD AUTO: 3.25 10*3/MM3 (ref 1.7–7)
PLATELET # BLD AUTO: 294 10*3/MM3 (ref 140–450)
PMV BLD AUTO: 9.1 FL (ref 6–12)
POTASSIUM SERPL-SCNC: 3.8 MMOL/L (ref 3.5–5.2)
PROT SERPL-MCNC: 6.7 G/DL (ref 6–8.5)
RBC # BLD AUTO: 3.78 10*6/MM3 (ref 3.77–5.28)
SODIUM SERPL-SCNC: 135 MMOL/L (ref 136–145)
TIBC SERPL-MCNC: 370 MCG/DL (ref 298–536)
TRANSFERRIN SERPL-MCNC: 248 MG/DL (ref 200–360)
WBC NRBC COR # BLD AUTO: 15.45 10*3/MM3 (ref 3.4–10.8)

## 2025-04-10 PROCEDURE — 84466 ASSAY OF TRANSFERRIN: CPT

## 2025-04-10 PROCEDURE — 80053 COMPREHEN METABOLIC PANEL: CPT

## 2025-04-10 PROCEDURE — 82784 ASSAY IGA/IGD/IGG/IGM EACH: CPT

## 2025-04-10 PROCEDURE — 82728 ASSAY OF FERRITIN: CPT

## 2025-04-10 PROCEDURE — 36415 COLL VENOUS BLD VENIPUNCTURE: CPT

## 2025-04-10 PROCEDURE — 83540 ASSAY OF IRON: CPT

## 2025-04-10 PROCEDURE — 85025 COMPLETE CBC W/AUTO DIFF WBC: CPT

## 2025-04-10 PROCEDURE — 82232 ASSAY OF BETA-2 PROTEIN: CPT

## 2025-04-11 NOTE — PROGRESS NOTES
[facility] HEMATOLOGY AND ONCOLOGY  [address]  [facility] [address] [phone]    Patient Name: Liset Wright  Encounter Date: 04/17/2025  YOB: 1950  Patient Number: [id number]       REASON FOR VISIT:  Liset Wright is a 74-year-old female who returns in follow-up of stage 0 chronic lymphocytic leukemia (B-CLL). She is seen 74.5 months since her initial visit on 1/28/19. She remains in observation. She is here alone.    I have reviewed the HPI and verified with the patient the accuracy of it. No changes to interval history since the information was documented. Jay Cedeno MD 04/17/25      DIAGNOSTIC ABNORMALITIES:   On 11/28/2018, labs showed a WBC of 13.9 with 78% lymphocytes (ALC 10.9), ANC 2.4, and was otherwise normal. Hemoglobin 12, hematocrit 36.7, MCV 87, platelets 261,000. CMP was normal in its entirety to include a BUN of 11, creatinine 0.78, GFR 79, calcium 9.2, total protein 6.8, and normal liver enzymes.  On 01/11/2019, CT of the abdomen and pelvis with IV contrast at [facility] showed no acute pathology in the abdomen or pelvis (no masses or any abnormalities to account for her left upper quadrant pain with no adenopathy and normal spleen).  On 01/16/2019, Ainsley-Barr virus titers showed an IgG level of 239 (0 - 17.9), Ainsley-Barr virus nuclear antigen IgG 118 (0 - 17.9), but IgM of less than 36 and early antigen of less than 9 (indicating prior infection).  On 01/10/2019, repeat CBC showed a hemoglobin of 11.3, hematocrit 35.5, MCV 91.3, platelets 249,000, WBC 13.29.   On 01/14/2019, blood smear (manual) review showed atypical lymphocytosis, moderate smudge cells present. RBC morphology showed normocytic, normochromic anemia without significant morphologic abnormality. Platelets normal morphology.  On 01/17/2019, peripheral blood was sent for flow cytometry ([facility]). This  revealed chronic lymphocytic leukemia (54% of total cells). Monoclonal B cells have a CLL immunophenotype and are negative for both CD38 and ZAP-70 associated with standard risk disease. PCR for IGVH and p53 mutation and FISH for CLL may provide additional prognostic information. Virtually all of the B cells are monoclonal and express CD19 (dim), CD20 (dim), CD5, CD23, CD11c, and dim surface kappa light chain. They are negative for CD10, CD38, FMC-7, ZAP-70, and surface lambda light chain.   Labs, 01/28/2019: Hemoglobin 13.1, hematocrit 39.5, MCV 90.5, platelets 283,000, WBC 12.2 with 19.3 segs (ANC 2.4), 75.8 lymphocytes (ALC 9.2). CMP normal. GFR 85, calcium 9.9, total protein 7.1, and normal liver enzymes.  (265 - 665). Beta 2 microglobulin 1.3. Serum iron 101, TIBC 333, iron saturation 30%, B12 of 431, folate 15 (each within reference range). Ferritin 23.7 (depressed). HIV screen negative. IgG 742.  Bone marrow biopsy/aspirate, 02/01/2019: PERIPHERAL BLOOD: B cell chronic lymphocytic leukemia. BONE MARROW, UNSPECIFIED SITE, SMEARS, CLOT AND BIOPSY: Involved by B cell chronic lymphocytic leukemia (30%). CLL panel: Positive for a deletion of DLEUI, DILEU2 on chromosome 13 at 04 (58,0% of cells), consistent with CLL. Isolated del 1304.3 is associated with a favorable clinical course. Three of the twenty mitotic cells examined were characterized by loss of one copy of the X chromosome and a deletion in the long arm of chromosome 13.   Stools OB x 3, 02/25/2019. Negative x 3    PREVIOUS INTERVENTIONS:   Observation        Problem List Items Addressed This Visit          Other    CLL (chronic lymphocytic leukemia) - Primary    Relevant Orders    CBC & Differential    Comprehensive Metabolic Panel    Ferritin    Iron Profile    Lactate Dehydrogenase    Beta 2 Microglobulin, Serum    IgG             Oncology/Hematology History    No history exists.       PAST MEDICAL HISTORY:  ALLERGIES:  Allergies   Allergen  Reactions    Levofloxacin Paresthesia    Promethazine Unknown - High Severity    Phenergan [Promethazine Hcl] Other (See Comments)     Jerky movements    Ramipril Palpitations     CURRENT MEDICATIONS:  Outpatient Encounter Medications as of 4/17/2025   Medication Sig Dispense Refill    albuterol sulfate  (90 Base) MCG/ACT inhaler Inhale 2 puffs Every 4 (Four) Hours As Needed for Wheezing. 18 g 4    amLODIPine (NORVASC) 10 MG tablet Take 1 tablet by mouth Daily.      dicyclomine (BENTYL) 10 MG capsule Take 1 capsule by mouth 2 (Two) Times a Day.      losartan-hydrochlorothiazide (HYZAAR) 50-12.5 MG per tablet Take 1 tablet by mouth Daily.      metoprolol tartrate (LOPRESSOR) 100 MG tablet Take 1 tablet by mouth 3 (Three) Times a Day.      omeprazole (priLOSEC) 40 MG capsule Take 1 capsule by mouth Daily.       No facility-administered encounter medications on file as of 4/17/2025.     ADULT ILLNESSES:   Chronic lymphocytic leukemia ( ICD-10:C91.10 ;Chronic lymphocytic leukemia of B-cell type not having achieved remission   Abdominal pain ( ICD-10:R10.12 ;Left upper quadrant pain   Asthma ( Dr. Johnson; ICD-10:J45.909 ;Unspecified asthma, uncomplicated )   Cervical degenerative disk disease ( x-ray 11/2017; ICD-10:M50.20 ;Other cervical disc displacement, unspecified cervical region )   Erosive gastritis ( Dr. Kaur; ICD-10:K29.60 ;Other gastritis without bleeding )   Gastroesophageal reflux disease ( GERD, Dr. Kaur; ICD-10:K21.9 ;Gastro-esophageal reflux disease without esophagitis )   Gastroparesis ( ICD-10:K31.84 ;Gastroparesis   Hypertension ( ICD-10:I10 ;Essential (primary) hypertension   Irritable bowel syndrome ( ICD-10:K58.9 ;Irritable bowel syndrome without diarrhea   Migraines ( ICD-10:G43.909 ;Migraine, unspecified, not intractable, without status migrainosus   Normocytic anemia ( ICD-10:D64.9 ;Anemia, unspecified   Palpitations ( normal heart cath, 02/2003, bilateral ultrasound showed no  "significant stenosis, 06/2015, stress echo 05/2018 normal; ICD-10:R00.2 ;Palpitations )   Schatzki esophageal ring ( erosive gastritis/gastroesophageal reflux disease, Dr. Kaur; ICD-10:K22.2 ;Esophageal obstruction )  Right hip fracture following a fall, 04/13/2021.  Non-surgical management      SURGERIES:   Cholecystectomy   Cardiac catheterization, 02/2003   Colonoscopy, 2017. \"No polyps.\" 5 years. Dr. Kaur   EGD (upper endoscopy), 2018, normal, Dr. Kaur   Hysterectomy, total      ADULT ILLNESSES:  Patient Active Problem List   Diagnosis Code    Palpitations R00.2    PVCs (premature ventricular contractions) I49.3    Essential hypertension I10    Epigastric pain R10.13    CLL (chronic lymphocytic leukemia) C91.10    Bronchitis J40    Restrictive lung disease J98.4    Encounter for screening for malignant neoplasm of colon Z12.11    Right ventricular dilation I51.7    Chest pain R07.9    Closed fracture of pelvis with routine healing S32.9XXD    Degeneration of lumbar or lumbosacral intervertebral disc M51.379    Non-smoker Z78.9    Body mass index (BMI) of 23.0 to 23.9 in adult Z68.23    Gastroesophageal reflux disease K21.9    Leukemia C95.90    Nausea R11.0    Alpha-1-antitrypsin deficiency carrier Z14.8    Low serum IgG for age R76.8    Polyneuropathy G62.9     SURGERIES:  Past Surgical History:   Procedure Laterality Date    CARDIAC CATHETERIZATION      CHOLECYSTECTOMY      COLONOSCOPY  10/16/2015    normal    COLONOSCOPY N/A 10/19/2020    Procedure: COLONOSCOPY WITH ANESTHESIA;  Surgeon: Grant Kaur DO;  Location: [facility] ENDOSCOPY;  Service: Gastroenterology;  Laterality: N/A;  pre: screening  post: normal  Harry Hastings MD    ENDOSCOPY N/A 11/23/2016    normal    ENDOSCOPY N/A 12/11/2018    Procedure: ESOPHAGOGASTRODUODENOSCOPY WITH ANESTHESIA;  Surgeon: Grant Kaur DO;  Location: [facility] ENDOSCOPY;  Service: Gastroenterology    ENDOSCOPY N/A 11/25/2020    Procedure: " ESOPHAGOGASTRODUODENOSCOPY WITH ANESTHESIA;  Surgeon: Grant Kaur DO;  Location: [facility] ENDOSCOPY;  Service: Gastroenterology;  Laterality: N/A;  preop; chest pain  postop; normal   PCP Harry Hastings     ENDOSCOPY N/A 11/11/2022    Procedure: ESOPHAGOGASTRODUODENOSCOPY WITH ANESTHESIA;  Surgeon: Grant Kaur DO;  Location:  [facility] ENDOSCOPY;  Service: Gastroenterology;  Laterality: N/A;  Pre: Nausea  Post:normal  Harry Hastings MD    HYSTERECTOMY       HEALTH MAINTENANCE ITEMS:  Health Maintenance Due   Topic Date Due    TDAP/TD VACCINES (1 - Tdap) Never done    ZOSTER VACCINE (1 of 2) Never done    HEPATITIS C SCREENING  Never done    DXA SCAN  01/14/2023    COVID-19 Vaccine (7 - 2024-25 season) 09/01/2024    MAMMOGRAM  01/19/2025       <no information>  Last Completed Colonoscopy            Upcoming       COLORECTAL CANCER SCREENING (COLONOSCOPY - Every 5 Years) Next due on 10/19/2025      10/19/2020  COLONOSCOPY    10/19/2020  Surgical Procedure: COLONOSCOPY    10/01/2020  Outside Procedure: COLONOSCOPY    02/25/2019  Occult Blood X 3, Stool - Stool, Per Rectum    10/16/2015  SCANNED - COLONOSCOPY     Only the first 5 history entries have been loaded, but more history exists.                        Immunization History   Administered Date(s) Administered    COVID-19 (PFIZER) 12YRS+ (COMIRNATY) 01/11/2024    COVID-19 (PFIZER) BIVALENT 12+YRS 02/08/2023    COVID-19 (PFIZER) Purple Cap Monovalent 01/15/2021, 02/05/2021, 08/26/2021    Covid-19 (Pfizer) Gray Cap Monovalent 05/19/2022    FLUAD TRI 65YR+ 10/06/2017, 10/09/2018    Fluzone  >6mos 10/01/2018    Fluzone (or Fluarix & Flulaval for VFC) >6mos 11/01/2019    Fluzone High-Dose 65+YRS 11/11/2021    Fluzone Quad >6mos (Multi-dose) 10/01/2018    Hep B, Unspecified 01/10/2002, 02/15/2002, 05/16/2002    Influenza, Unspecified 11/02/2020    Pneumococcal Conjugate 13-Valent (PCV13) 10/06/2017    Pneumococcal Polysaccharide (PPSV23) 10/01/2018     Last  "Completed Mammogram            Current Care Gaps       MAMMOGRAM (Every 2 Years) Due since 1/19/2025 01/19/2023  Outside Claim: CHG SCREENING DIGITAL BREAST TOMOSYNTHESIS BI    01/19/2023  Outside Claim: HC MAMMOGRAM SCREENING BILAT DIGITAL W CAD    01/17/2022  Outside Claim: CHG SCREENING DIGITAL BREAST TOMOSYNTHESIS BI    01/17/2022  Outside Claim: HC MAMMOGRAM SCREENING BILAT DIGITAL W CAD    01/14/2021  Outside Claim: HC MAMMOGRAM SCREENING BILAT DIGITAL W CAD      Only the first 5 history entries have been loaded, but more history exists.                              FAMILY HISTORY:  Family History   Problem Relation Age of Onset    Cancer Mother     Cancer Father         lung    Cancer Paternal Grandmother         stomach    No Known Problems Brother     No Known Problems Maternal Aunt     No Known Problems Maternal Uncle     No Known Problems Paternal Aunt     No Known Problems Paternal Uncle     No Known Problems Maternal Grandmother     No Known Problems Maternal Grandfather     No Known Problems Paternal Grandfather     No Known Problems Other     Colon cancer Neg Hx     Esophageal cancer Neg Hx     Asthma Neg Hx     Diabetes Neg Hx     Emphysema Neg Hx     Heart failure Neg Hx     Hypertension Neg Hx     Colon polyps Neg Hx      SOCIAL HISTORY:  Social History     Socioeconomic History    Marital status:    Tobacco Use    Smoking status: Never     Passive exposure: Current    Smokeless tobacco: Never   Vaping Use    Vaping status: Never Used   Substance and Sexual Activity    Alcohol use: No    Drug use: No    Sexual activity: Defer       REVIEW OF SYSTEMS:  Constitutional:   The patient's appetite is good, \"good.\"  Her energy is variable, \"still some days better than others but tired sometimes.\" She manages her ADLs including chores, errands.  She still drives.  \"I drove today.\" She has regained 3 lb (had lost 2 lb at her prior visit) since her last visit.  She has no fevers, chills, or " "drenching night sweats.  Her sleep habits seem appropriate.  Ear/Nose/Throat/Mouth:   She reports no ear pains, sinus symptoms, sore throat, nosebleeds, or sore tongue. She has migraine headaches. She denies any hoarseness.   Ocular:   She reports no eye pain, significant change in visual acuity, double vision, or blurry vision.  Respiratory:   Says she is prone to respiratory infections due to alpha 1 antitrypsin deficiency.  No lung infections since last visit, 10/2023 and 01/2024.  Says that through it all her home O2 sats run 97-98% on RA.  No cough.  She has some exertional dyspnea from possible asthma but no chronic cough, significant shortness of breathing at rest or unexplained chest wall pain. Says prior PFTs suggest \"asthma\".  Is followed by [facility]  Cardiovascular:   She reports no exertional chest pain, chest pressure, or chest heaviness. She reports no claudication. She reports occasional palpitations but denies symptomatic orthostasis.  Gastrointestinal:   She reports no dysphagia, nausea, vomiting, postprandial abdominal pain, bloating, cramping, change in bowel habits, with dark (OTC iron) discoloration of the stool. She reports no rectal bleeding. She reports occasional but chronic constipation requiring stool softeners, lifelong. She has no diarrhea.  Genitourinary:   She reports no urinary burning, frequency, dribbling, or discoloration. She reports no difficulty controlling her bladder. She has no need to urinate frequently through the night.   Musculoskeletal:          She reports no unexplained arthralgias, myalgias, or nighttime leg cramping.  Says she sustained a right hip fracture following a fall, 04/13/2021.  Non-surgical management.  Extremities:   She reports no trouble with fluid retention or significant leg swelling.  Endocrine:   She reports no problems with excess thirst, excessive urination, vasomotor instability, or unexplained fatigue.  Heme/Lymphatic:   She reports easy " "bruising but no unexplained bleeding, petechial rashes, or swollen glands.  Skin:   She reports no itching, rashes, or lesions which won't heal.  Neuro:   She reports no loss of consciousness, seizures, fainting spells, or dizziness. She reports no weakness of face, arms, or legs. She has no difficulty with speech. She has no tremors. She admits to occasional paresthesias of the hands.   Psych:   She seems generally satisfied with life. She denies depression. She reports no mood swings.       VITAL SIGNS: /62   Pulse 50   Temp 96.8 °F (36 °C) (Temporal)   Resp 18   Ht 165.1 cm (65\")   Wt 63.5 kg (140 lb)   LMP  (LMP Unknown)   SpO2 98%   BMI 23.30 kg/m² Body surface area is 1.7 meters squared.  Pain Score    04/17/25 0908   PainSc: 0-No pain       I have reexamined the patient and the results are consistent with the previously documented exam. Jay Cedeno MD      PHYSICAL EXAMINATION:   General:   She is a pleasant, cooperative, slender but otherwise well-developed, well-nourished, and modestly-kept elderly female who is comfortable at rest. She arrived in the exam room ambulatory. She appears to be her stated age. Her skin color is normal. ECOG 0  Head/Neck:   The patient is anicteric and atraumatic. The trachea is midline. The neck is supple without evidence of jugular venous distention or cervical adenopathy. Prominent, non-tender right sternoclavicular joint.  Eyes:   The pupils are equal, round, and reactive to light. The extraocular movements are full. There is no scleral jaundice or erythema.   Chest:   The respiratory efforts are normal and unhindered. There are soft scattered wheezes, but no rales, rhonchi or asymmetry of breath sounds.  Cardiovascular:   The patient has a regular cardiac rate and rhythm without murmurs, rubs, or gallops. The peripheral pulses are equal and full.  Abdomen:   The belly is soft and flat. There is no rebound or guarding. There is no organomegaly, " mass-effect, or tenderness. Bowel sounds are active and of normal character.  Extremities:   There is no evidence of cyanosis, clubbing, or edema.  Rheumatologic:   There is no overt evidence of rheumatoid deformities of the hands. There is no sausaging of the fingers. There is no sign of active synovitis. The gait is normal.  Cutaneous:   There are no overt rashes, disseminated lesions, purpura, or petechiae.   Lymphatics:   There is no evidence of adenopathy in the cervical, supraclavicular, axillary, inguinal, or femoral areas. There is no overt splenomegaly.  Neurologic:   The patient is alert, oriented, cooperative, and pleasant. She is appropriately conversant. She ambulated into the exam room without assistance and transferred from chair to exam table unaided. There is no overt dysfunction of the motor, sensory, cerebellar systems.  Psych:   Mood and affect are appropriate for circumstance. Eye contact is appropriate. Normal judgement and decision making.         LABS    Lab Results - Last 18 Months   Lab Units 04/10/25  0910 10/10/24  0917 04/11/24  0759 12/12/23  0914   HEMOGLOBIN g/dL 11.6* 12.0 11.9* 11.6*   HEMATOCRIT % 34.3 36.7 37.7 36.5   MCV fL 90.7 91.5 92.4 92.6   WBC 10*3/mm3 15.45* 17.10* 11.87* 14.63*   RDW % 12.9 12.6 12.8 13.2   MPV fL 9.1 8.8 9.4 9.6   PLATELETS 10*3/mm3 294 287 220 221   IMM GRAN % % 0.1  --   --   --    NEUTROS ABS 10*3/mm3 3.25 5.53 2.28 3.80   LYMPHS ABS 10*3/mm3 11.56*  --   --   --    MONOS ABS 10*3/mm3 0.53  --   --   --    EOS ABS 10*3/mm3 0.05 0.17 0.12 0.29   BASOS ABS 10*3/mm3 0.04 0.00  --  0.15   IMMATURE GRANS (ABS) 10*3/mm3 0.02  --   --   --    NEUTROPHIL % %  --  29.3* 19.2* 26.0*   MONOCYTES % %  --  4.0* 3.0* 3.0*   BASOPHIL % %  --  0.0  --  1.0   ATYP LYMPH % %  --  4.0 2.0 8.0*   ANISOCYTOSIS   --  Slight/1+  --  Slight/1+       Lab Results - Last 18 Months   Lab Units 04/10/25  0910 10/10/24  1442 10/10/24  0917 04/11/24  0759 12/12/23  0914   GLUCOSE  mg/dL 112* 106* 131* 109* 132*   SODIUM mmol/L 135* 133* 130* 140 139   POTASSIUM mmol/L 3.8 3.7 3.6 4.4 4.4   CO2 mmol/L 31.0* 32.0* 29.0 30.0* 31.0*   CHLORIDE mmol/L 93* 93* 91* 103 100   ANION GAP mmol/L 11.0 8.0 10.0 7.0 8.0   CREATININE mg/dL 0.92 0.71 0.76 0.76 0.80   BUN mg/dL 17 13 13 13 10   BUN / CREAT RATIO  18.5 18.3 17.1 17.1 12.5   CALCIUM mg/dL 9.1 9.4 9.4 9.2 9.0   ALK PHOS U/L 79  --  133* 84 88   TOTAL PROTEIN g/dL 6.7  --  7.0 6.3 6.2   ALT (SGPT) U/L 12  --  17 10 11   AST (SGOT) U/L 18  --  24 18 17   BILIRUBIN mg/dL 0.6  --  0.7 0.6 0.7   ALBUMIN g/dL 4.3  --  4.1 4.3 4.1   GLOBULIN gm/dL 2.4  --  2.9 2.0 2.1       Lab Results - Last 18 Months   Lab Units 10/10/24  0917 24  0759 23  0914   LDH U/L 218* 206 221*       Lab Results - Last 18 Months   Lab Units 04/10/25  0910 10/10/24  0917 24  0759 23  0914   IRON mcg/dL 70 41 103 114   TIBC mcg/dL 370 328 332 320   IRON SATURATION (TSAT) % 19* 13* 31 36   FERRITIN ng/mL 140.10 237.90* 109.20 138.40       ASSESSMENT:   1. B cell chronic lymphocytic leukemia (B-CLL):   Stage: 0   Tumor [place]: Lymphocytosis.   Complications of Tumor: None.   Tumor Status: Untreated.   IVGH: Pending.   CD38: Negative.   ZAP-70: Negative.   Isolated del 13q14.3 (favorable)   LDH: 218, 10/10/2024 (prior: 197 - 599)   B2M: 2, 4/10/25 (prior: 1.3 - 2.1)   Ig, 4/10/25 (prior: 517 - 742)  4/10/2025-WBC 15.45; ALC 11.56 (range: WBC 13.0-23.99; ALC 8.64-15.25)  2. Normocytic anemia.    --Repleted ferritin - 140, 4/10/2025 (from 237; 109; 136; 111; 184; 133; 116; 159, 145.4, 131.5; 53.2; 35; 23).   --Hgb 11.6; MCV 90.7, 4/10/2025(prior: Hgb 11.3 - 13.3; MCV 87 - 98.2)   3. Irritable bowel syndrome. On Bentyl  4. Gastroparesis. On omeprazole  5. Migraines. On Excedrin migraines  6. Cervical degenerative disk disease.  No meds        7.  Alpha 1 antitrypsin deficiency. Dr. Johnson.  Now followed by Dr. Vasquez        8.  Palpitations with echo,  07/24/2020 showing grade II diastolic dysfunction but EF > 70%.  Dr. Hawk follows        9.  Prominent right sterno-clavicular joint. Asymptomatic         --09/02/2020-right sternoclavicular joint x-ray.  Mild arthritic changes of the inferior proximal right clavicle adjacent to the sternoclavicular joint with appropriate alignment.         10. Received COVID # 4 vaccination, 05/2022       11. Recurrent pulmonary infections.  Followed by pulmonary in [facility]- Dr. Russell  --1/24/2023- Visit with Dr. [id number] on recurrent respiratory infections and immunoglobulin G deficiency. She reports that recently she had again symptoms of upper respiratory tract infection. Her CT of the chest showed areas of groundglass opacities that are scattered and calcified granulomas.  The patient's recurrent upper respiratory infections symptoms could be related to common variable immune deficiency secondary to low IgG versus immune deficiency secondary to CLL or both. She might benefit from a trial of IVIG. We will proceed with IVIG.  --7/25/23- CT chest- Multiple nodular densities within the lungs bilaterally which have developed in the interval.  PET imaging recommended. Unexpected findings.  Multiple new pulmonary masses.   --9/25/23 - CT chest.  Stable nonaggressive nodules.  Improving bronchiolitis.  Remote granulomatous disease.  No new abnormality.  --10/23/23- follow-up Dr. Vasquze.  A/P:  Lung nodules- CT CHEST WO CONTRAST; Future.  Alpha-1-antitrypsin deficiency carrier.  CVID (common variable immunodeficiency) ([facility]).  Pneumonia of right middle lobe due to infectious organism.  CLL (chronic lymphocytic leukemia) ([facility]).  OHARA (dyspnea on exertion).  We reviewed the CT images with the patient. There is definite clearing of the prior infiltrate. She does have low IgG level. She also has CLL which predisposes her to recurrent infections. We discussed starting IVIG. I will discuss this with Dr. Dumont her  hematologist at [facility].  We will repeat CT of the chest in 6 months to assure stability of her nodules.   --11/2/23- Consult with immunology, Dr. Simon, 11/22/23-A/P: Recurrent infections-likely borderline IgGs due to abnormal B cells.  Do not suspect common variable hypogammaglobulinemia.  At this time I do not feel she would benefit from immunoglobulin replacement.  That might change if she has documented recurrent pneumonitis with infiltrates by chest x-ray (reports pending) and strong evidence of lack of immune function (vaccine response) and has a low IgA/or IgM.  In the past she has received antibiotics and steroids.  Would definitely avoid the latter if at all possible.  It may be she has higher than average need for antibiotics, however documented bacterial infection by x-rays or culture or highly suspected by labs given her diagnosis of CLL.  Draw labs (diphtheria IgG antibody, IgA, IgG, IgM, lymphocyte subset, pneumococcal IgG 23 serotypes.  Tetanus IgG antibody.  Follow-up 5-6 weeks.  --4/9/24- Follow-up Dr. Vasquez-ASSESSMENT/PLAN: 1. Lung nodules - CT CHEST WO CONTRAST; Future  2. Alpha-1-antitrypsin deficiency carrier; 3. CVID (common variable immunodeficiency) ([facility]); 4. Pneumonia of right middle lobe due to infectious organism; 5. CLL (chronic lymphocytic leukemia) ([facility]). Will repeat CT of the chest in one year and follow up at that time. She is doing well. Not IVIG candidate per Dr. Cedeno.     12.  Mild hyponatremia.   HCTZ recently added to her BP regimen.      RECOMMENDATIONS:   1.   Apprised of labs from 10/10/24-4/10/25, with sodium 135 (chely: 130), glucose 112, otherwise stable CMP, stable leukocytosis/lymphocytosis, normal ANC, stable anemia, normal platelets, normal (2.0) B2M, LDH p (prior peak: 299), iron 70, ferritin 140, adequate IgG 619 (prior: 517-742).    2.   Again review consult, 11/22/23 from Dr. Simon of immunology.  A/P:  Does not feel IVIG warranted (see  "above).  3.  Previously reviewed follow-up Dr. Vasquez, 4/9/24-(above). CT chest 1 year  4.  Previously discussed the bone marrow biopsy, 02/01 (above). Confirms B-CLL with del 13q14 (favorable)   5.  B- CLL again previously discussed. I had explained that standard therapy has not appreciably altered the nature history of CLL and survival curves demonstrate that CLL is an incurable disease. Randomized studies have failed to show a survival advantage of immediate treatment over delayed treatment for asymptomatic patients with early stage disease. As a result, treatment is generally reserved until the patient has active disease defined as the presence of disease-related symptoms: Weight loss greater than 10%; extreme fatigue; fever greater than 100.5 for 2 weeks with night sweats without evidence of infection (\"B\" symptoms); worsening cytopenias (HGB less than 11; platelets less than 100,000); unexplained recurrent infections; worsening adenopathy, or splenomegaly. She is aware to call should she develop any of these symptoms.      6.  Return to the [place] office in 24 weeks with pre-office serum iron, Fe sat, ferritin, CBC and differential, IgG, LDH, B2M.      I spent ~33 minutes caring for Liset on this date of service. This time includes time spent by me in the following activities: preparing for the visit, reviewing tests, performing a medically appropriate examination and/or evaluation, counseling and educating the patient/family/caregiver, ordering medications, tests, or procedures and documenting information in the medical record.       cc: Harry Hastings MD     "

## 2025-04-17 ENCOUNTER — OFFICE VISIT (OUTPATIENT)
Dept: ONCOLOGY | Facility: CLINIC | Age: 75
End: 2025-04-17
Payer: MEDICARE

## 2025-04-17 VITALS
DIASTOLIC BLOOD PRESSURE: 62 MMHG | TEMPERATURE: 96.8 F | BODY MASS INDEX: 23.32 KG/M2 | HEIGHT: 65 IN | RESPIRATION RATE: 18 BRPM | WEIGHT: 140 LBS | HEART RATE: 50 BPM | SYSTOLIC BLOOD PRESSURE: 118 MMHG | OXYGEN SATURATION: 98 %

## 2025-04-17 DIAGNOSIS — C91.10 CLL (CHRONIC LYMPHOCYTIC LEUKEMIA): Primary | ICD-10-CM

## 2025-05-08 ENCOUNTER — OFFICE VISIT (OUTPATIENT)
Dept: PULMONOLOGY | Age: 75
End: 2025-05-08
Payer: MEDICARE

## 2025-05-08 VITALS
TEMPERATURE: 97.7 F | WEIGHT: 140.6 LBS | BODY MASS INDEX: 23.43 KG/M2 | SYSTOLIC BLOOD PRESSURE: 163 MMHG | HEIGHT: 65 IN | HEART RATE: 47 BPM | RESPIRATION RATE: 20 BRPM | OXYGEN SATURATION: 97 % | DIASTOLIC BLOOD PRESSURE: 73 MMHG

## 2025-05-08 DIAGNOSIS — D83.9 CVID (COMMON VARIABLE IMMUNODEFICIENCY) (HCC): ICD-10-CM

## 2025-05-08 DIAGNOSIS — C91.10 CLL (CHRONIC LYMPHOCYTIC LEUKEMIA) (HCC): ICD-10-CM

## 2025-05-08 DIAGNOSIS — R91.8 LUNG NODULES: Primary | ICD-10-CM

## 2025-05-08 DIAGNOSIS — Z14.8 ALPHA-1-ANTITRYPSIN DEFICIENCY CARRIER: ICD-10-CM

## 2025-05-08 PROCEDURE — 99214 OFFICE O/P EST MOD 30 MIN: CPT | Performed by: INTERNAL MEDICINE

## 2025-05-08 PROCEDURE — G8400 PT W/DXA NO RESULTS DOC: HCPCS | Performed by: INTERNAL MEDICINE

## 2025-05-08 PROCEDURE — 1123F ACP DISCUSS/DSCN MKR DOCD: CPT | Performed by: INTERNAL MEDICINE

## 2025-05-08 PROCEDURE — G8427 DOCREV CUR MEDS BY ELIG CLIN: HCPCS | Performed by: INTERNAL MEDICINE

## 2025-05-08 PROCEDURE — 1159F MED LIST DOCD IN RCRD: CPT | Performed by: INTERNAL MEDICINE

## 2025-05-08 PROCEDURE — 3017F COLORECTAL CA SCREEN DOC REV: CPT | Performed by: INTERNAL MEDICINE

## 2025-05-08 PROCEDURE — G8420 CALC BMI NORM PARAMETERS: HCPCS | Performed by: INTERNAL MEDICINE

## 2025-05-08 PROCEDURE — 1090F PRES/ABSN URINE INCON ASSESS: CPT | Performed by: INTERNAL MEDICINE

## 2025-05-08 PROCEDURE — 1036F TOBACCO NON-USER: CPT | Performed by: INTERNAL MEDICINE

## 2025-05-08 RX ORDER — AMLODIPINE BESYLATE 10 MG/1
10 TABLET ORAL DAILY
COMMUNITY

## 2025-05-08 RX ORDER — LOSARTAN POTASSIUM AND HYDROCHLOROTHIAZIDE 25; 100 MG/1; MG/1
1 TABLET ORAL DAILY
COMMUNITY

## 2025-05-08 ASSESSMENT — ENCOUNTER SYMPTOMS
ABDOMINAL PAIN: 0
SHORTNESS OF BREATH: 1
RHINORRHEA: 0
CHEST TIGHTNESS: 0
ANAL BLEEDING: 0
APNEA: 0
BACK PAIN: 0
WHEEZING: 0
ABDOMINAL DISTENTION: 0

## 2025-05-08 NOTE — PROGRESS NOTES
Pulmonary and Sleep Medicine    Sophia Hidalgo (:  1950) is a 74 y.o. female,Established patient, here for evaluation of the following chief complaint(s):  Follow-up (1 year follow up- Lung Nodules/Chest Ct-2025./)      Referring physician:  No referring provider defined for this encounter.     ASSESSMENT/PLAN:  1. Lung nodules  2. Alpha-1-antitrypsin deficiency carrier  3. CVID (common variable immunodeficiency) (HCC)  4. CLL (chronic lymphocytic leukemia) (HCC)        Lung nodule subsided.  Likely infectious/inflammatory.  CVID seems to be stable.  Has never been on IVIG supplement.  Doing well at this time.  We will see as needed.       Ara Lafleur MD, [facility], [facility]    Return if symptoms worsen or fail to improve.    SUBJECTIVE/OBJECTIVE:        Patient is here for follow-up on lung nodules and tree-in-bud formation and low IgG.  She has not been on supplemental IVIG.  She had a CT of the chest for follow-up on tree-in-bud and nodular infiltrates that according to my interpretation showed complete resolution of the prior infiltrates.  Denies any new complaints at this time.          Continue the following medications as reported by the patient:    Prior to Visit Medications    Medication Sig Taking? Authorizing Provider   amLODIPine (NORVASC) 10 MG tablet Take 1 tablet by mouth daily for blood pressure Yes Dianne Hirsch MD   losartan-hydroCHLOROthiazide (HYZAAR) 100-25 MG per tablet Take 1 tablet by mouth daily Yes Dianne Hirsch MD   albuterol sulfate HFA (PROVENTIL;VENTOLIN;PROAIR) 108 (90 Base) MCG/ACT inhaler Inhale 2 puffs into the lungs every 4 hours as needed Yes Dianne Hirsch MD   omeprazole (PRILOSEC) 40 MG delayed release capsule Take 1 capsule by mouth in the morning and at bedtime Yes Ara Lafleur MD   dicyclomine (BENTYL) 10 MG capsule Take 1 capsule by mouth 2 times daily Yes Dianne Hirsch MD   metoprolol (LOPRESSOR) 100 MG tablet

## 2025-07-24 NOTE — PROGRESS NOTES
[facility] - PODIATRY    Today's Date: 08/01/2025     Patient Name: Liset Wright  MRN: [id number]  CSN: [id number]  PCP: Harry Hastings MD  Referring Provider: No ref. provider found    SUBJECTIVE     Chief Complaint   Patient presents with    Follow-up     PCP: Harry Hastings MD 05/08/25  Return in about 3 months (around 5/25/2025) for Follow-up with APRN, Follow[facility]up in [facility].   Pt is here today for foot/nail care. Pt denies pain.      HPI: Liset Wright, a 74 y.o.female, comes to clinic as a(n) established patient complaining of ingrown toenails. Patient has h/o Acid Reflux, Alpha-1-Antitrypsin Deficiency, Arrhythmia, Arthritis, Asthma, Leukemia, HTN, Low Back Pain, Palpitations, and Polyneuropathy. Patient is non-diabetic.  Toenails are thick, long, irregular and in need of care.  Reports right greater toenail has been causing mild discomfort.  Denies drainage.  Reports nail is still irregular looking likely from previous avulsion.  Notes thickened, skin buildup around the nail.  Denies SOI. Denies open wounds or sores today.Relates previous treatment(s) including bilateral PNA. Denies any constitutional symptoms. No other pedal complaints at this time.    Past Medical History:   Diagnosis Date    Acid reflux     Alpha-1-antitrypsin deficiency     Arrhythmia     Arthritis     Asthma     Cancer     leukemia    Hypertension     Ingrown toenail     Low back pain     Palpitations      Past Surgical History:   Procedure Laterality Date    CARDIAC CATHETERIZATION      CHOLECYSTECTOMY      COLONOSCOPY  10/16/2015    normal    COLONOSCOPY N/A 10/19/2020    Procedure: COLONOSCOPY WITH ANESTHESIA;  Surgeon: Grant Kaur DO;  Location: [facility] ENDOSCOPY;  Service: Gastroenterology;  Laterality: N/A;  pre: screening  post: normal  Harry Hastings MD    ENDOSCOPY N/A 11/23/2016    normal    ENDOSCOPY N/A 12/11/2018    Procedure: ESOPHAGOGASTRODUODENOSCOPY WITH ANESTHESIA;  Surgeon:  Grant Kaur DO;  Location: [facility] ENDOSCOPY;  Service: Gastroenterology    ENDOSCOPY N/A 11/25/2020    Procedure: ESOPHAGOGASTRODUODENOSCOPY WITH ANESTHESIA;  Surgeon: Grant Kaur DO;  Location: [facility] ENDOSCOPY;  Service: Gastroenterology;  Laterality: N/A;  preop; chest pain  postop; normal   PCP Harry Hastings     ENDOSCOPY N/A 11/11/2022    Procedure: ESOPHAGOGASTRODUODENOSCOPY WITH ANESTHESIA;  Surgeon: Grant Kaur DO;  Location: [facility] ENDOSCOPY;  Service: Gastroenterology;  Laterality: N/A;  Pre: Nausea  Post:normal  Harry Hastings MD    HYSTERECTOMY       Family History   Problem Relation Age of Onset    Cancer Mother     Cancer Father         lung    Cancer Paternal Grandmother         stomach    No Known Problems Brother     No Known Problems Maternal Aunt     No Known Problems Maternal Uncle     No Known Problems Paternal Aunt     No Known Problems Paternal Uncle     No Known Problems Maternal Grandmother     No Known Problems Maternal Grandfather     No Known Problems Paternal Grandfather     No Known Problems Other     Colon cancer Neg Hx     Esophageal cancer Neg Hx     Asthma Neg Hx     Diabetes Neg Hx     Emphysema Neg Hx     Heart failure Neg Hx     Hypertension Neg Hx     Colon polyps Neg Hx      Social History     Socioeconomic History    Marital status:    Tobacco Use    Smoking status: Never     Passive exposure: Current    Smokeless tobacco: Never   Vaping Use    Vaping status: Never Used   Substance and Sexual Activity    Alcohol use: No    Drug use: No    Sexual activity: Defer     Allergies   Allergen Reactions    Levofloxacin Paresthesia    Promethazine Unknown - High Severity    Phenergan [Promethazine Hcl] Other (See Comments)     Jerky movements    Ramipril Palpitations     Current Outpatient Medications   Medication Sig Dispense Refill    albuterol sulfate  (90 Base) MCG/ACT inhaler Inhale 2 puffs Every 4 (Four) Hours As Needed for Wheezing. 18 g 4     amLODIPine (NORVASC) 10 MG tablet Take 1 tablet by mouth Daily.      dicyclomine (BENTYL) 10 MG capsule Take 1 capsule by mouth 2 (Two) Times a Day.      losartan-hydrochlorothiazide (HYZAAR) 50-12.5 MG per tablet Take 1 tablet by mouth Daily.      metoprolol tartrate (LOPRESSOR) 100 MG tablet Take 1 tablet by mouth 3 (Three) Times a Day.      omeprazole (priLOSEC) 40 MG capsule Take 1 capsule by mouth Daily.       No current facility-administered medications for this visit.     Review of Systems   Constitutional:  Negative for activity change and fever.   HENT:  Negative for congestion.    Respiratory:  Negative for shortness of breath.    Cardiovascular:  Negative for chest pain and leg swelling.   Gastrointestinal:  Negative for abdominal pain, constipation, diarrhea, nausea and vomiting.   Musculoskeletal:  Positive for arthralgias.        Pain around greater toenails   Skin:  Negative for color change and wound.        Thickened, elongated toenails.  Irregular looking greater toenails.   Neurological:  Negative for dizziness, weakness and numbness.   Psychiatric/Behavioral:  Negative for agitation, behavioral problems and confusion.        OBJECTIVE     Vitals:    08/01/25 0807   BP: 118/76   Pulse: 52   SpO2: 98%                   PHYSICAL EXAM  GEN:   Accompanied by none.     Foot/Ankle Exam    GENERAL  Appearance:  appears stated age  Orientation:  AAOx3  Affect:  appropriate  Gait:  unimpaired  Assistance:  independent  Right shoe gear: casual shoe  Left shoe gear: casual shoe    VASCULAR     Right Foot Vascularity   Dorsalis pedis:  2+  Posterior tibial:  2+  Skin temperature:  warm  Edema grading:  None  CFT:  3  Pedal hair growth:  Present  Varicosities:  none     Left Foot Vascularity   Dorsalis pedis:  2+  Posterior tibial:  2+  Skin temperature:  warm  Edema grading:  None  CFT:  3  Pedal hair growth:  Present  Varicosities:  none     NEUROLOGIC     Right Foot Neurologic   Light touch sensation:  diminished  Vibratory sensation: normal  Hot/Cold sensation: normal     Left Foot Neurologic   Light touch sensation: diminished  Vibratory sensation: normal  Hot/Cold sensation:  normal    MUSCULOSKELETAL     Right Foot Musculoskeletal   Ecchymosis:  none  Tenderness:  toe 1 tenderness and toenail problem    Arch:  Normal     Left Foot Musculoskeletal   Ecchymosis:  none  Tenderness:  toe 1 tenderness and toenail problem  Arch:  Normal    MUSCLE STRENGTH     Right Foot Muscle Strength   Normal strength    Foot dorsiflexion:  5  Foot plantar flexion:  5  Foot inversion:  5  Foot eversion:  5     Left Foot Muscle Strength   Normal strength    Foot dorsiflexion:  5  Foot plantar flexion:  5  Foot inversion:  5  Foot eversion:  5    RANGE OF MOTION     Right Foot Range of Motion   Foot and ankle ROM within normal limits       Left Foot Range of Motion   Foot and ankle ROM within normal limits      DERMATOLOGIC      Right Foot Dermatologic   Skin  Negative for drainage.   Nails  1.  Positive for elongated, abnormal thickness, dystrophic nail and ingrown toenail. Negative for paronychia.  2.  Positive for elongated and abnormal thickness.  3.  Positive for elongated.  4.  Positive for elongated.  5.  Positive for elongated.     Left Foot Dermatologic   Skin  Negative for drainage.   Nails  1.  Positive for elongated, abnormal thickness and dystrophic nail. Negative for ingrown toenail and paronychia.  2.  Positive for elongated and abnormal thickness.  3.  Positive for elongated.  4.  Positive for elongated.  5.  Positive for elongated.      RADIOLOGY/NUCLEAR:  No results found.    LABORATORY/CULTURE RESULTS:      PATHOLOGY RESULTS:       ASSESSMENT/PLAN     Diagnoses and all orders for this visit:    1. Dystrophic nail (Primary)    2. Thickened nails    3. Pain around toenail    4. Polyneuropathy        Comprehensive lower extremity examination and evaluation was performed.  Discussed findings and treatment plan  including risks, benefits, and treatment options with patient in detail. Patient agreed with treatment plan.  After verbal consent obtained, nail(s) x10 debrided of length and thickness with nail nipper without incidence  After verbal consent obtained, nail(s) x1 debrided of offending borders with nail nipper without incidence  Patient may maintain nails and calluses at home utilizing emery board or pumice stone between visits as needed  May benefit from revisional PNA versus TNA to right greater toenail if continues to be painful.  There are no active SOI apparent today.  Educated on SOI including fever, localized warmth, redness, pain, purulent drainage, malodor, etc., and to notify us if any of these symptoms present.  Otherwise patient scheduled to return in 3 months.    Encouraged to call sooner with any questions or concerns.     An After Visit Summary was printed and given to the patient at discharge, including (if requested) any available informative/educational handouts regarding diagnosis, treatment, or medications. All questions were answered to patient/family satisfaction. Should symptoms fail to improve or worsen they agree to call or return to clinic or to go to the Emergency Department. Discussed the importance of following up with any needed screening tests/labs/specialist appointments and any requested follow-up recommended by me today. Importance of maintaining follow-up discussed and patient accepts that missed appointments can delay diagnosis and potentially lead to worsening of conditions.  Return in about 3 months (around 11/1/2025) for Follow-up with APRN, Follow-up in [facility] or sooner if acute issues arise.    I spent 10 minutes caring for Liset on this date of service. This time includes time spent by me in the following activities: preparing for the visit, performing a medically appropriate examination and/or evaluation, counseling and educating the patient/family/caregiver, and  documenting information in the medical record  I spent 5 minutes on the separately reported service of toenail debridement. This time is not included in the time used to support the E/M service also reported today.        This document has been electronically signed by YENNY Hsieh on August 1, 2025 09:26 CDT

## 2025-08-01 ENCOUNTER — OFFICE VISIT (OUTPATIENT)
Age: 75
End: 2025-08-01
Payer: MEDICARE

## 2025-08-01 VITALS
BODY MASS INDEX: 23.32 KG/M2 | OXYGEN SATURATION: 98 % | SYSTOLIC BLOOD PRESSURE: 118 MMHG | DIASTOLIC BLOOD PRESSURE: 76 MMHG | HEIGHT: 65 IN | WEIGHT: 140 LBS | HEART RATE: 52 BPM

## 2025-08-01 DIAGNOSIS — M79.676 PAIN AROUND TOENAIL: ICD-10-CM

## 2025-08-01 DIAGNOSIS — L60.3 DYSTROPHIC NAIL: Primary | ICD-10-CM

## 2025-08-01 DIAGNOSIS — L60.2 THICKENED NAILS: ICD-10-CM

## 2025-08-01 DIAGNOSIS — G62.9 POLYNEUROPATHY: ICD-10-CM

## (undated) DEVICE — LARYNGOSCOPE BLDE MAC HNDL M SZ 35 ST CURAPLEX CURAVIEW LED

## (undated) DEVICE — FORCEPS BX L100CM DIA1.8MM WRK CHN 2MM PULM S STL RAD JAW 4

## (undated) DEVICE — YANKAUER,BULB TIP WITH VENT: Brand: ARGYLE

## (undated) DEVICE — FORCEPS BIOPSY SMOOTH CUP

## (undated) DEVICE — Device: Brand: DEFENDO AIR/WATER/SUCTION AND BIOPSY VALVE

## (undated) DEVICE — TBG SMPL FLTR LINE NASL 02/C02 A/ BX/100

## (undated) DEVICE — TUBE ET 8.5MM NSL ORAL BASIC CUF INTMED MURPHY EYE RADPQ

## (undated) DEVICE — PATCHES NAVIGATION PATIENT

## (undated) DEVICE — SINGLE USE SUCTION VALVE MAJ-209: Brand: SINGLE USE SUCTION VALVE (STERILE)

## (undated) DEVICE — CONMED SCOPE SAVER BITE BLOCK, 20X27 MM: Brand: SCOPE SAVER

## (undated) DEVICE — VALVE SHEATH BRONCHOSCOPE

## (undated) DEVICE — FRCP BX RADJAW4 NDL 2.8 240 STD OG

## (undated) DEVICE — MASK,OXYGEN,MED CONC,ADLT,7' TUB, UC: Brand: PENDING

## (undated) DEVICE — SENSR O2 OXIMAX FNGR A/ 18IN NONSTR

## (undated) DEVICE — CUFF,BP,DISP,1 TUBE,ADULT,HP: Brand: MEDLINE

## (undated) DEVICE — THE CHANNEL CLEANING BRUSH IS A NYLON FLEXI BRUSH ATTACHED TO A FLEXIBLE PLASTIC SHEATH DESIGNED TO SAFELY REMOVE DEBRIS FROM FLEXIBLE ENDOSCOPES.

## (undated) DEVICE — SOLUTION IV IRRIG POUR BRL 0.9% SODIUM CHL 2F7124

## (undated) DEVICE — ENDO KIT,LOURDES HOSPITAL: Brand: MEDLINE INDUSTRIES, INC.

## (undated) DEVICE — TUBING FLUIDICS BRONCHOSCOPE

## (undated) DEVICE — BRONCHOSCOPES MONARCH

## (undated) DEVICE — MAX-A-L MAX ADULT LONG PROBE: Brand: MEDLINE

## (undated) DEVICE — ENDOGATOR AUXILIARY WATER JET CONNECTOR: Brand: ENDOGATOR

## (undated) DEVICE — SINGLE USE BIOPSY VALVE MAJ-210: Brand: SINGLE USE BIOPSY VALVE (STERILE)

## (undated) DEVICE — KIT INTRODUCER BRONCHOSCOPE PATIENT